# Patient Record
Sex: FEMALE | Race: WHITE | NOT HISPANIC OR LATINO
[De-identification: names, ages, dates, MRNs, and addresses within clinical notes are randomized per-mention and may not be internally consistent; named-entity substitution may affect disease eponyms.]

---

## 2017-04-15 ENCOUNTER — TRANSCRIPTION ENCOUNTER (OUTPATIENT)
Age: 52
End: 2017-04-15

## 2017-06-16 ENCOUNTER — OUTPATIENT (OUTPATIENT)
Dept: OUTPATIENT SERVICES | Facility: HOSPITAL | Age: 52
LOS: 1 days | Discharge: HOME | End: 2017-06-16

## 2017-06-28 DIAGNOSIS — G04.81 OTHER ENCEPHALITIS AND ENCEPHALOMYELITIS: ICD-10-CM

## 2017-07-25 ENCOUNTER — OUTPATIENT (OUTPATIENT)
Dept: OUTPATIENT SERVICES | Facility: HOSPITAL | Age: 52
LOS: 1 days | Discharge: HOME | End: 2017-07-25

## 2017-07-25 DIAGNOSIS — M54.9 DORSALGIA, UNSPECIFIED: ICD-10-CM

## 2017-11-19 ENCOUNTER — TRANSCRIPTION ENCOUNTER (OUTPATIENT)
Age: 52
End: 2017-11-19

## 2018-01-24 ENCOUNTER — TRANSCRIPTION ENCOUNTER (OUTPATIENT)
Age: 53
End: 2018-01-24

## 2018-09-29 ENCOUNTER — TRANSCRIPTION ENCOUNTER (OUTPATIENT)
Age: 53
End: 2018-09-29

## 2018-12-28 ENCOUNTER — APPOINTMENT (OUTPATIENT)
Dept: HEMATOLOGY ONCOLOGY | Facility: CLINIC | Age: 53
End: 2018-12-28

## 2018-12-28 VITALS
WEIGHT: 188 LBS | TEMPERATURE: 97.7 F | DIASTOLIC BLOOD PRESSURE: 71 MMHG | HEART RATE: 81 BPM | RESPIRATION RATE: 14 BRPM | SYSTOLIC BLOOD PRESSURE: 109 MMHG

## 2018-12-28 VITALS — HEIGHT: 66 IN | BODY MASS INDEX: 30.34 KG/M2

## 2018-12-28 DIAGNOSIS — K52.9 NONINFECTIVE GASTROENTERITIS AND COLITIS, UNSPECIFIED: ICD-10-CM

## 2018-12-28 RX ORDER — ALPRAZOLAM 0.5 MG/1
0.5 TABLET ORAL
Qty: 120 | Refills: 0 | Status: ACTIVE | COMMUNITY
Start: 2018-12-10

## 2018-12-28 NOTE — ASSESSMENT
[FreeTextEntry1] : 54 yo woman is supposed to have breast reduction surgery. Her CARPINI score is above 5. She will benefit from prophylaxis with anticoagulation post surgery for at least 7 days. Early ambulation is encouraged after surgery.

## 2018-12-28 NOTE — HISTORY OF PRESENT ILLNESS
[de-identified] : 54 yo woman is evaluated for post operative anticoagulation: she is going to have a breast reduction surgery. Her CAPRINI score is above 5. \par No PERSONAL or family  history of DVT \par Had varicose veins surgery

## 2019-01-03 ENCOUNTER — LABORATORY RESULT (OUTPATIENT)
Age: 54
End: 2019-01-03

## 2019-01-03 ENCOUNTER — OUTPATIENT (OUTPATIENT)
Dept: OUTPATIENT SERVICES | Facility: HOSPITAL | Age: 54
LOS: 1 days | Discharge: HOME | End: 2019-01-03
Payer: COMMERCIAL

## 2019-01-03 ENCOUNTER — APPOINTMENT (OUTPATIENT)
Dept: HEMATOLOGY ONCOLOGY | Facility: CLINIC | Age: 54
End: 2019-01-03

## 2019-01-03 VITALS
SYSTOLIC BLOOD PRESSURE: 106 MMHG | DIASTOLIC BLOOD PRESSURE: 77 MMHG | TEMPERATURE: 97.6 F | WEIGHT: 188 LBS | HEIGHT: 66 IN | HEART RATE: 87 BPM | BODY MASS INDEX: 30.22 KG/M2 | RESPIRATION RATE: 14 BRPM

## 2019-01-03 LAB
HCT VFR BLD CALC: 40 %
HGB BLD-MCNC: 13.8 G/DL
MCHC RBC-ENTMCNC: 31.4 PG
MCHC RBC-ENTMCNC: 34.5 G/DL
MCV RBC AUTO: 90.9 FL
PLATELET # BLD AUTO: 222 K/UL
PMV BLD: 11.3 FL
RBC # BLD: 4.4 M/UL
RBC # FLD: 11.8 %
RETICS # AUTO: 1.5 %
RETICS AGGREG/RBC NFR: 63.8 K/UL
WBC # FLD AUTO: 5.27 K/UL

## 2019-01-03 PROCEDURE — 93970 EXTREMITY STUDY: CPT | Mod: 26

## 2019-01-03 NOTE — HISTORY OF PRESENT ILLNESS
[de-identified] : 54 yo woman is evaluated for post operative anticoagulation: she is going to have a breast reduction surgery. Her CAPRINI score is above 5. \par No PERSONAL or family  history of DVT \par Had varicose veins surgery because of the collapse of varicose veins

## 2019-01-03 NOTE — ASSESSMENT
[FreeTextEntry1] : Breast surgeon requests COMPREHENSIVE HEMATOLOGY w/u before breast reduction including hypercoagulability w/u and doppler of legs; the concern of the surgeon is a risk of blood clot\par \par will get labs and doppler\par rtc in one week\par advised a breast surgeon to refer pt to vascular surgery

## 2019-01-04 LAB
ALBUMIN SERPL ELPH-MCNC: 4.6 G/DL
ALP BLD-CCNC: 70 U/L
ALT SERPL-CCNC: 47 U/L
ANION GAP SERPL CALC-SCNC: 15 MMOL/L
APTT BLD: 29.2 SEC
AST SERPL-CCNC: 27 U/L
AT III PPP CHRO-ACNC: 106 %
BILIRUB DIRECT SERPL-MCNC: <0.2 MG/DL
BILIRUB INDIRECT SERPL-MCNC: >0.2 MG/DL
BILIRUB SERPL-MCNC: 0.4 MG/DL
BUN SERPL-MCNC: 16 MG/DL
CALCIUM SERPL-MCNC: 9.6 MG/DL
CHLORIDE SERPL-SCNC: 100 MMOL/L
CO2 SERPL-SCNC: 26 MMOL/L
CREAT SERPL-MCNC: 0.8 MG/DL
DEPRECATED D DIMER PPP IA-ACNC: 148 NG/ML DDU
FERRITIN SERPL-MCNC: 133 NG/ML
FIBRINOGEN PPP COAG.DERIVED-MCNC: 360 MG/DL
FOLATE SERPL-MCNC: 9.3 NG/ML
GLUCOSE SERPL-MCNC: 104 MG/DL
HAPTOGLOB SERPL-MCNC: 104 MG/DL
INR PPP: 0.96 RATIO
IRON SATN MFR SERPL: 30 %
IRON SERPL-MCNC: 103 UG/DL
POTASSIUM SERPL-SCNC: 4.4 MMOL/L
PROT S AG ACT/NOR PPP IA: 151 %
PROT SERPL-MCNC: 6.9 G/DL
PT BLD: 11.1 SEC
SODIUM SERPL-SCNC: 141 MMOL/L
TIBC SERPL-MCNC: 345 UG/DL
UIBC SERPL-MCNC: 242 UG/DL
VIT B12 SERPL-MCNC: 477 PG/ML

## 2019-01-07 LAB
B2 GLYCOPROT1 IGA SERPL IA-ACNC: <5 SAU
B2 GLYCOPROT1 IGG SER-ACNC: <5 SGU
B2 GLYCOPROT1 IGM SER-ACNC: <5 SMU
CARDIOLIPIN IGM SER-MCNC: 7.5 GPL
CARDIOLIPIN IGM SER-MCNC: <5 MPL
CONFIRM: NORMAL
DNA PLOIDY SPEC FC-IMP: NORMAL
DRVVT IMM 1:2 NP PPP: NORMAL
DRVVT SCREEN TO CONFIRM RATIO: 0.9 RATIO
PROT C PPP CHRO-ACNC: 141 %
PTR INTERP: NORMAL
SCREEN DRVVT: NORMAL
SILICA CLOTTING TIME INTERPRETATION: NORMAL
SILICA CLOTTING TIME S/C: 0.94 RATIO

## 2019-01-08 LAB — PROT S FREE PPP-ACNC: 105 % NORMAL

## 2019-01-09 DIAGNOSIS — M79.89 OTHER SPECIFIED SOFT TISSUE DISORDERS: ICD-10-CM

## 2019-01-10 LAB — DIRECT COOMBS: NORMAL

## 2019-01-11 ENCOUNTER — APPOINTMENT (OUTPATIENT)
Dept: HEMATOLOGY ONCOLOGY | Facility: CLINIC | Age: 54
End: 2019-01-11

## 2019-01-11 ENCOUNTER — OUTPATIENT (OUTPATIENT)
Dept: OUTPATIENT SERVICES | Facility: HOSPITAL | Age: 54
LOS: 1 days | Discharge: HOME | End: 2019-01-11

## 2019-01-11 VITALS — DIASTOLIC BLOOD PRESSURE: 64 MMHG | HEART RATE: 75 BPM | TEMPERATURE: 98.3 F | SYSTOLIC BLOOD PRESSURE: 120 MMHG

## 2019-01-11 DIAGNOSIS — D68.9 COAGULATION DEFECT, UNSPECIFIED: ICD-10-CM

## 2019-01-11 NOTE — ASSESSMENT
[FreeTextEntry1] : Seen because Breast surgeon requested COMPREHENSIVE HEMATOLOGY w/u before breast reduction including hypercoagulability w/u and doppler of legs; all work up is negative including labs and duplex of labs\par \par \par advised a breast surgeon to refer pt to vascular surgery\par does not appear to have contraindications for breast reduction\par pt needs to f/u with GI

## 2019-01-11 NOTE — CONSULT LETTER
[Dear  ___] : Dear  [unfilled], [Consult Letter:] : I had the pleasure of evaluating your patient, [unfilled]. [Please see my note below.] : Please see my note below. [Consult Closing:] : Thank you very much for allowing me to participate in the care of this patient.  If you have any questions, please do not hesitate to contact me. [FreeTextEntry2] : Dr. Rosy Waite\par [phone] phone  [FreeTextEntry3] : Rickey Soliz DO\par Attending Physician,\par Hematology/ Medical Oncology\par [phone] office\par \par

## 2019-01-11 NOTE — HISTORY OF PRESENT ILLNESS
[de-identified] : 54 yo woman is evaluated for post operative anticoagulation: she is going to have a breast reduction surgery. Her CAPRINI score is above 5. \par No PERSONAL or family  history of DVT \par Had varicose veins surgery because of the collapse of varicose veins\par HLA B27+ for AS\par homozygous for genes for hemochromatosis (no records available)

## 2019-01-15 ENCOUNTER — RESULT REVIEW (OUTPATIENT)
Age: 54
End: 2019-01-15

## 2019-01-15 ENCOUNTER — OUTPATIENT (OUTPATIENT)
Dept: OUTPATIENT SERVICES | Facility: HOSPITAL | Age: 54
LOS: 1 days | Discharge: HOME | End: 2019-01-15

## 2019-01-22 LAB — SURGICAL PATHOLOGY STUDY: SIGNIFICANT CHANGE UP

## 2019-01-24 ENCOUNTER — TRANSCRIPTION ENCOUNTER (OUTPATIENT)
Age: 54
End: 2019-01-24

## 2019-01-24 ENCOUNTER — EMERGENCY (EMERGENCY)
Facility: HOSPITAL | Age: 54
LOS: 0 days | Discharge: HOME | End: 2019-01-24
Attending: EMERGENCY MEDICINE | Admitting: EMERGENCY MEDICINE
Payer: COMMERCIAL

## 2019-01-24 VITALS
HEART RATE: 91 BPM | RESPIRATION RATE: 18 BRPM | SYSTOLIC BLOOD PRESSURE: 122 MMHG | DIASTOLIC BLOOD PRESSURE: 63 MMHG | TEMPERATURE: 99 F | OXYGEN SATURATION: 98 %

## 2019-01-24 VITALS — WEIGHT: 188.94 LBS

## 2019-01-24 DIAGNOSIS — I10 ESSENTIAL (PRIMARY) HYPERTENSION: ICD-10-CM

## 2019-01-24 DIAGNOSIS — Z98.890 OTHER SPECIFIED POSTPROCEDURAL STATES: Chronic | ICD-10-CM

## 2019-01-24 DIAGNOSIS — R60.0 LOCALIZED EDEMA: ICD-10-CM

## 2019-01-24 DIAGNOSIS — Z98.86 PERSONAL HISTORY OF BREAST IMPLANT REMOVAL: ICD-10-CM

## 2019-01-24 DIAGNOSIS — M79.89 OTHER SPECIFIED SOFT TISSUE DISORDERS: ICD-10-CM

## 2019-01-24 DIAGNOSIS — Z98.890 OTHER SPECIFIED POSTPROCEDURAL STATES: ICD-10-CM

## 2019-01-24 DIAGNOSIS — E78.5 HYPERLIPIDEMIA, UNSPECIFIED: ICD-10-CM

## 2019-01-24 LAB
ALBUMIN SERPL ELPH-MCNC: 3.9 G/DL — SIGNIFICANT CHANGE UP (ref 3.5–5.2)
ALP SERPL-CCNC: 71 U/L — SIGNIFICANT CHANGE UP (ref 30–115)
ALT FLD-CCNC: 43 U/L — HIGH (ref 0–41)
ANION GAP SERPL CALC-SCNC: 15 MMOL/L — HIGH (ref 7–14)
APPEARANCE UR: ABNORMAL
AST SERPL-CCNC: 25 U/L — SIGNIFICANT CHANGE UP (ref 0–41)
BACTERIA # UR AUTO: SIGNIFICANT CHANGE UP /HPF
BASOPHILS # BLD AUTO: 0.03 K/UL — SIGNIFICANT CHANGE UP (ref 0–0.2)
BASOPHILS NFR BLD AUTO: 0.4 % — SIGNIFICANT CHANGE UP (ref 0–1)
BILIRUB SERPL-MCNC: 0.4 MG/DL — SIGNIFICANT CHANGE UP (ref 0.2–1.2)
BILIRUB UR-MCNC: NEGATIVE — SIGNIFICANT CHANGE UP
BUN SERPL-MCNC: 11 MG/DL — SIGNIFICANT CHANGE UP (ref 10–20)
CALCIUM SERPL-MCNC: 9.1 MG/DL — SIGNIFICANT CHANGE UP (ref 8.5–10.1)
CHLORIDE SERPL-SCNC: 99 MMOL/L — SIGNIFICANT CHANGE UP (ref 98–110)
CO2 SERPL-SCNC: 25 MMOL/L — SIGNIFICANT CHANGE UP (ref 17–32)
COLOR SPEC: YELLOW — SIGNIFICANT CHANGE UP
CREAT SERPL-MCNC: 0.7 MG/DL — SIGNIFICANT CHANGE UP (ref 0.7–1.5)
DIFF PNL FLD: NEGATIVE — SIGNIFICANT CHANGE UP
EOSINOPHIL # BLD AUTO: 0.21 K/UL — SIGNIFICANT CHANGE UP (ref 0–0.7)
EOSINOPHIL NFR BLD AUTO: 3.1 % — SIGNIFICANT CHANGE UP (ref 0–8)
EPI CELLS # UR: ABNORMAL /HPF
GLUCOSE SERPL-MCNC: 114 MG/DL — HIGH (ref 70–99)
GLUCOSE UR QL: NEGATIVE MG/DL — SIGNIFICANT CHANGE UP
HCT VFR BLD CALC: 36.1 % — LOW (ref 37–47)
HGB BLD-MCNC: 12 G/DL — SIGNIFICANT CHANGE UP (ref 12–16)
IMM GRANULOCYTES NFR BLD AUTO: 0.3 % — SIGNIFICANT CHANGE UP (ref 0.1–0.3)
KETONES UR-MCNC: NEGATIVE — SIGNIFICANT CHANGE UP
LEUKOCYTE ESTERASE UR-ACNC: NEGATIVE — SIGNIFICANT CHANGE UP
LYMPHOCYTES # BLD AUTO: 1.61 K/UL — SIGNIFICANT CHANGE UP (ref 1.2–3.4)
LYMPHOCYTES # BLD AUTO: 23.6 % — SIGNIFICANT CHANGE UP (ref 20.5–51.1)
MCHC RBC-ENTMCNC: 30.2 PG — SIGNIFICANT CHANGE UP (ref 27–31)
MCHC RBC-ENTMCNC: 33.2 G/DL — SIGNIFICANT CHANGE UP (ref 32–37)
MCV RBC AUTO: 90.9 FL — SIGNIFICANT CHANGE UP (ref 81–99)
MONOCYTES # BLD AUTO: 0.48 K/UL — SIGNIFICANT CHANGE UP (ref 0.1–0.6)
MONOCYTES NFR BLD AUTO: 7 % — SIGNIFICANT CHANGE UP (ref 1.7–9.3)
NEUTROPHILS # BLD AUTO: 4.48 K/UL — SIGNIFICANT CHANGE UP (ref 1.4–6.5)
NEUTROPHILS NFR BLD AUTO: 65.6 % — SIGNIFICANT CHANGE UP (ref 42.2–75.2)
NITRITE UR-MCNC: NEGATIVE — SIGNIFICANT CHANGE UP
NRBC # BLD: 0 /100 WBCS — SIGNIFICANT CHANGE UP (ref 0–0)
NT-PROBNP SERPL-SCNC: 80 PG/ML — SIGNIFICANT CHANGE UP (ref 0–300)
PH UR: 7.5 — SIGNIFICANT CHANGE UP (ref 5–8)
PLATELET # BLD AUTO: 262 K/UL — SIGNIFICANT CHANGE UP (ref 130–400)
POTASSIUM SERPL-MCNC: 4.2 MMOL/L — SIGNIFICANT CHANGE UP (ref 3.5–5)
POTASSIUM SERPL-SCNC: 4.2 MMOL/L — SIGNIFICANT CHANGE UP (ref 3.5–5)
PROT SERPL-MCNC: 6.6 G/DL — SIGNIFICANT CHANGE UP (ref 6–8)
PROT UR-MCNC: NEGATIVE MG/DL — SIGNIFICANT CHANGE UP
RBC # BLD: 3.97 M/UL — LOW (ref 4.2–5.4)
RBC # FLD: 12.3 % — SIGNIFICANT CHANGE UP (ref 11.5–14.5)
RBC CASTS # UR COMP ASSIST: SIGNIFICANT CHANGE UP /HPF
SODIUM SERPL-SCNC: 139 MMOL/L — SIGNIFICANT CHANGE UP (ref 135–146)
SP GR SPEC: 1.01 — SIGNIFICANT CHANGE UP (ref 1.01–1.03)
UROBILINOGEN FLD QL: 0.2 MG/DL — SIGNIFICANT CHANGE UP (ref 0.2–0.2)
WBC # BLD: 6.83 K/UL — SIGNIFICANT CHANGE UP (ref 4.8–10.8)
WBC # FLD AUTO: 6.83 K/UL — SIGNIFICANT CHANGE UP (ref 4.8–10.8)
WBC UR QL: SIGNIFICANT CHANGE UP /HPF

## 2019-01-24 PROCEDURE — 93970 EXTREMITY STUDY: CPT | Mod: 26

## 2019-01-24 NOTE — ED ADULT TRIAGE NOTE - CHIEF COMPLAINT QUOTE
Bilateral lower extremity swelling  (+1 edema), denies chest pain or SOB. no change in color, skin is warm and intact to bilateral lower extremities . pt reports low grade temp 99.9 , resolved with percocet. no drainage or  redness at the incision as per pt. pt has surgery 1/15 breast reduction and abdominoplasty. pt sent by the surgeon to r/o DVT

## 2019-01-24 NOTE — ED PROVIDER NOTE - OBJECTIVE STATEMENT
54 yo female with PMHx HTN, HLD presents s/p breast reduction and tummy tuck on 1/15 for LE swelling B/L R>L  x 10 days. Patient denies HX CHF, HX kidney failure, fevers, chest pain, SOB, abdominal pain, nausea, vomiting, diarrhea, constipation, dizziness, weakness, changes in PO intake, changes in urine output, changes in mental status.

## 2019-01-24 NOTE — ED PROVIDER NOTE - NSFOLLOWUPINSTRUCTIONS_ED_ALL_ED_FT
Follow up with your primary care doctor in 48 hours for re-evaluation and further treatment.    Peripheral Edema    Peripheral edema is swelling that is caused by a buildup of fluid. Peripheral edema most often affects the lower legs, ankles, and feet. It can also develop in the arms, hands, and face. The area of the body that has peripheral edema will look swollen. It may also feel heavy or warm. Your clothes may start to feel tight. Pressing on the area may make a temporary dent in your skin. You may not be able to move your arm or leg as much as usual.     There are many causes of peripheral edema. It can be a complication of other diseases, such as congestive heart failure, kidney disease, or a problem with your blood circulation. It also can be a side effect of certain medicines. It often happens to women during pregnancy. Sometimes, the cause is not known. Treating the underlying condition is often the only treatment for peripheral edema.    HOME CARE INSTRUCTIONS  Pay attention to any changes in your symptoms. Take these actions to help with your discomfort:    Raise (elevate) your legs while you are sitting or lying down.  Move around often to prevent stiffness and to lessen swelling. Do not sit or stand for long periods of time.  Wear support stockings as told by your health care provider.  Follow instructions from your health care provider about limiting salt (sodium) in your diet. Sometimes eating less salt can reduce swelling.  Take over-the-counter and prescription medicines only as told by your health care provider. Your health care provider may prescribe medicine to help your body get rid of excess water (diuretic).  Keep all follow-up visits as told by your health care provider. This is important.    SEEK MEDICAL CARE IF:  You have a fever.  Your edema starts suddenly or is getting worse, especially if you are pregnant or have a medical condition.  You have swelling in only one leg.  You have increased swelling and pain in your legs.    SEEK IMMEDIATE MEDICAL CARE IF:  You develop shortness of breath, especially when you are lying down.  You have pain in your chest or abdomen.  You feel weak.  You faint.

## 2019-01-24 NOTE — ED ADULT NURSE NOTE - OBJECTIVE STATEMENT
s/p breast reduction and abdominoplasty on 1/15/19 now c/o b/l lower extremity edema. Denies any calf pain denies any sob or c/p. Denies any complications w/ abdomen

## 2019-01-24 NOTE — ED PROVIDER NOTE - MEDICAL DECISION MAKING DETAILS
53f w b/l LE swelling after recent surgery. nontoxic appearing, n/v intact. Labs, EKG, & imaging reviewed. Pt advised regarding symptomatic/supportive care, importance of PMD/Surgeon f/u, and symptoms to prompt ED return. Copy of results given to patient.

## 2019-01-24 NOTE — ED PROVIDER NOTE - ATTENDING CONTRIBUTION TO CARE
53f w a hx of HTN & anxiety s/p breast reduction and abdominoplasty on 1/15. Pt presents w 5 days of b/l LE swelling. Symptoms are constant, moderate, no exacerbating/alleviating. Pt was on lovenox in period around procedures.     Review of Systems  Constitutional:  No fever or chills.   Eyes:  Negative.   ENMT:  No nasal congestion or discharge. No throat pain, swelling, or difficulty swallowing.  Cardiac:  No chest pain, or syncope. +edema.  Respiratory:  No dyspnea, stridor, wheezing, or cough. No hemoptysis.  GI:  No vomiting, diarrhea, or abdominal pain. No melena or hematochezia.  :  No dysuria or hematuria.   Musculoskeletal:  No joint pain or back pain.  Skin:  No skin rash, jaundice, or lesions.  Neuro:  No headache, loss of sensation, or focal weakness.  No change in mental status.     Physical Exam  General: Awake, alert, NAD, WDWN, non-toxic appearing, NCAT  Eyes: PERRL, EOMI, no icterus, lids and conjunctivae are normal  ENT: External inspection normal, pink/moist membranes, pharynx normal  CV: S1S2, regular rate and rhythm, no murmur/gallops/rubs, no JVD, 2+ pulses b/l, +edema b/l LE, no cords/homans, warm/well-perfused  Respiratory: Normal respiratory rate/effort, no respiratory distress, normal voice, speaking full sentences, lungs clear to auscultation b/l, no wheezing/rales/rhonchi, no retractions, no stridor  Abdomen: Soft abdomen, no tender/distended/guarding/rebound, no CVA tender  Musculoskeletal: FROM all 4 extremities, N/V intact, no delvin tender/deform, stable gait  Neck: FROM neck, supple, no meningismus, trachea midline, no JVD  Integumentary: Color normal for race, warm and dry, no rash  Neuro: Oriented x3, CN 2-12 grossly intact, normal motor, normal sensory  Psych: Oriented x3, mood normal, affect normal     53f w b/l LE swelling after recent surgery. nontoxic appearing, n/v intact. --Labs, EKG, US r/o DVT, observe/re-assess. 53f w a hx of HTN & anxiety s/p breast reduction and abdominoplasty on 1/15. Pt presents w 5 days of b/l LE swelling. Symptoms are constant, moderate, no exacerbating/alleviating. Pt was on lovenox in period around procedures.     Review of Systems  Constitutional:  No fever or chills.   Eyes:  Negative.   ENMT:  No nasal congestion or discharge. No throat pain, swelling, or difficulty swallowing.  Cardiac:  No chest pain, palpitations, or syncope. +edema.  Respiratory:  No dyspnea, stridor, wheezing, or cough. No hemoptysis.  GI:  No vomiting, diarrhea, or abdominal pain. No melena or hematochezia.  :  No dysuria or hematuria.   Musculoskeletal:  No joint pain or back pain.  Skin:  No skin rash, jaundice, or lesions.  Neuro:  No headache, loss of sensation, or focal weakness.  No change in mental status.     Physical Exam  General: Awake, alert, NAD, WDWN, non-toxic appearing, NCAT  Eyes: PERRL, EOMI, no icterus, lids and conjunctivae are normal  ENT: External inspection normal, pink/moist membranes, pharynx normal  CV: S1S2, regular rate and rhythm, no murmur/gallops/rubs, no JVD, 2+ pulses b/l, +edema b/l LE, no cords/homans, warm/well-perfused  Respiratory: Normal respiratory rate/effort, no respiratory distress, normal voice, speaking full sentences, lungs clear to auscultation b/l, no wheezing/rales/rhonchi, no retractions, no stridor  Abdomen: Soft abdomen, no tender/distended/guarding/rebound, no CVA tender  Musculoskeletal: FROM all 4 extremities, N/V intact, no delvin tender/deform, stable gait  Neck: FROM neck, supple, no meningismus, trachea midline, no JVD  Integumentary: Color normal for race, warm and dry, no rash  Neuro: Oriented x3, CN 2-12 grossly intact, normal motor, normal sensory  Psych: Oriented x3, mood normal, affect normal     53f w b/l LE swelling after recent surgery. nontoxic appearing, n/v intact. --Labs, EKG, US r/o DVT, observe/re-assess.

## 2019-01-24 NOTE — ED PROVIDER NOTE - PHYSICAL EXAMINATION
GEN: Well appearing, in no apparent distress.    HEAD:  Normocephalic, atraumatic.    EYES:  Clear conjunctivae without injection, drainage or discharge.    ENMT:  Nasal mucosa moist; mouth moist without ulcerations or lesions; throat moist without erythema, exudate, ulcerations or lesions.    NECK:  Supple, no masses. Normal ROM.    CARDIAC:  RRR, normal S1 and S2, no murmurs, rubs or gallops.    RESP:  Respiratory rate and effort appear normal; lungs are clear to auscultation bilaterally; no rhonchi, rales or wheezes.    ABDOMEN:  Soft, non-tender, non-distended, no masses. Normal BS throughout.    MUSCULOSKELETAL: (+) B/L leg swelling R>L, no calf tenderness. No erythema, warmth, induration. Patient able to ambulate without difficulty.  NEURO:  AAO x 3. Motor 5/5. Sensory intact.     SKIN:  Normal skin color for age and race, well-perfused; warm and dry.

## 2019-01-24 NOTE — ED PROVIDER NOTE - NS ED ROS FT
Constitutional: No fevers/chills.    Head: No injury, headache.    Eyes:  No visual changes, eye pain or discharge. No injury.    ENMT:  No hearing changes, pain, discharge or infections. No neck pain or stiffness. No loss ROM.    Cardiac:  No chest pain, SOB or edema. No chest pain with exertion.    Respiratory:  No cough or respiratory distress.     GI:  No nausea, vomiting, diarrhea or abdominal pain. No anorexia. No change in PO intake.    :  No frequency, urgency or burning. No change in urine output.    MS:  No leg pain, (+) B/L leg swelling, (-) myalgia, muscle weakness, joint pain or back pain. No loss ROM.    Neuro:  No dizziness or weakness.  No LOC.    Skin:  No skin rash.

## 2019-01-25 LAB
CULTURE RESULTS: NO GROWTH — SIGNIFICANT CHANGE UP
SPECIMEN SOURCE: SIGNIFICANT CHANGE UP

## 2019-07-26 ENCOUNTER — APPOINTMENT (OUTPATIENT)
Dept: HEMATOLOGY ONCOLOGY | Facility: CLINIC | Age: 54
End: 2019-07-26

## 2019-12-08 ENCOUNTER — TRANSCRIPTION ENCOUNTER (OUTPATIENT)
Age: 54
End: 2019-12-08

## 2019-12-10 ENCOUNTER — TRANSCRIPTION ENCOUNTER (OUTPATIENT)
Age: 54
End: 2019-12-10

## 2020-04-25 ENCOUNTER — MESSAGE (OUTPATIENT)
Age: 55
End: 2020-04-25

## 2020-05-12 ENCOUNTER — TRANSCRIPTION ENCOUNTER (OUTPATIENT)
Age: 55
End: 2020-05-12

## 2021-08-18 PROBLEM — I10 ESSENTIAL (PRIMARY) HYPERTENSION: Chronic | Status: ACTIVE | Noted: 2019-01-24

## 2021-08-18 PROBLEM — E78.5 HYPERLIPIDEMIA, UNSPECIFIED: Chronic | Status: ACTIVE | Noted: 2019-01-24

## 2021-08-20 ENCOUNTER — APPOINTMENT (OUTPATIENT)
Age: 56
End: 2021-08-20
Payer: COMMERCIAL

## 2021-08-20 VITALS
HEART RATE: 84 BPM | DIASTOLIC BLOOD PRESSURE: 86 MMHG | WEIGHT: 190 LBS | OXYGEN SATURATION: 98 % | BODY MASS INDEX: 30.53 KG/M2 | HEIGHT: 66 IN | SYSTOLIC BLOOD PRESSURE: 118 MMHG

## 2021-08-20 PROCEDURE — 99213 OFFICE O/P EST LOW 20 MIN: CPT

## 2021-08-20 NOTE — DISCUSSION/SUMMARY
[FreeTextEntry1] : VANDANA COMPLAINT AND BENEFITING, DISCUSS RECALL\par THE PATIENT'S PAP UNIT IS UNDER RECALL. THE PATIENT HAS REGISTERED THE PAP DEVICE. I DISCUSSED WITH THE PT THE PROS AND CONS TO CONTINUING PAP DEVICE GIVEN THE HEALTH RISKS.\par THE PATIENT NOT TO USE AN OZONE OR UV STERILIZER\par I WILL HELP IN ANY WAY POSSIBLE WITH TRANSITION TO NEW DEVICE\par WANT TO BUY NEW MACHINE\par ASTHMA REORDER INHALERS/ CXR

## 2021-09-23 ENCOUNTER — OUTPATIENT (OUTPATIENT)
Dept: OUTPATIENT SERVICES | Facility: HOSPITAL | Age: 56
LOS: 1 days | Discharge: HOME | End: 2021-09-23
Payer: COMMERCIAL

## 2021-09-23 ENCOUNTER — RESULT REVIEW (OUTPATIENT)
Age: 56
End: 2021-09-23

## 2021-09-23 DIAGNOSIS — Z98.890 OTHER SPECIFIED POSTPROCEDURAL STATES: Chronic | ICD-10-CM

## 2021-09-23 DIAGNOSIS — R06.02 SHORTNESS OF BREATH: ICD-10-CM

## 2021-09-23 PROCEDURE — 71046 X-RAY EXAM CHEST 2 VIEWS: CPT | Mod: 26

## 2022-01-23 ENCOUNTER — TRANSCRIPTION ENCOUNTER (OUTPATIENT)
Age: 57
End: 2022-01-23

## 2022-01-28 ENCOUNTER — TRANSCRIPTION ENCOUNTER (OUTPATIENT)
Age: 57
End: 2022-01-28

## 2022-02-16 ENCOUNTER — APPOINTMENT (OUTPATIENT)
Age: 57
End: 2022-02-16
Payer: COMMERCIAL

## 2022-02-16 VITALS
HEIGHT: 66 IN | HEART RATE: 74 BPM | BODY MASS INDEX: 30.22 KG/M2 | WEIGHT: 188 LBS | OXYGEN SATURATION: 97 % | RESPIRATION RATE: 14 BRPM | SYSTOLIC BLOOD PRESSURE: 114 MMHG | DIASTOLIC BLOOD PRESSURE: 72 MMHG

## 2022-02-16 PROCEDURE — 99213 OFFICE O/P EST LOW 20 MIN: CPT

## 2022-02-16 NOTE — DISCUSSION/SUMMARY
[FreeTextEntry1] : VANDANA COMPLAINT AND BENEFITING, DISCUSS RECALL\par ASTHMA KEEP INHALERS\par WEIGHT LOSS\par NEED PFT

## 2022-02-16 NOTE — HISTORY OF PRESENT ILLNESS
[TextBox_4] : VANDANA COMPLIANT AND BENEFITING/ MACHINE RECALLED\par ASTHMA/ INTERMITTENT CP/ TIGHTNESS

## 2022-06-28 ENCOUNTER — APPOINTMENT (OUTPATIENT)
Age: 57
End: 2022-06-28
Payer: COMMERCIAL

## 2022-06-28 VITALS
WEIGHT: 181 LBS | RESPIRATION RATE: 14 BRPM | HEIGHT: 66 IN | SYSTOLIC BLOOD PRESSURE: 122 MMHG | BODY MASS INDEX: 29.09 KG/M2 | OXYGEN SATURATION: 97 % | HEART RATE: 92 BPM | DIASTOLIC BLOOD PRESSURE: 86 MMHG

## 2022-06-28 PROCEDURE — 94729 DIFFUSING CAPACITY: CPT

## 2022-06-28 PROCEDURE — 94010 BREATHING CAPACITY TEST: CPT

## 2022-06-28 PROCEDURE — 94727 GAS DIL/WSHOT DETER LNG VOL: CPT

## 2022-06-28 PROCEDURE — 99213 OFFICE O/P EST LOW 20 MIN: CPT | Mod: 25

## 2022-06-28 NOTE — HISTORY OF PRESENT ILLNESS
[TextBox_4] : VANDANA COMPLIANT AND BENEFITING/ MACHINE RECALLED\par ASTHMA/ INTERMITTENT CP/ TIGHTNESS\par SP PFT

## 2022-06-28 NOTE — DISCUSSION/SUMMARY
[FreeTextEntry1] : VANDANA COMPLAINT AND BENEFITING, DISCUSS RECALL\par ASTHMA KEEP INHALERS\par WEIGHT LOSS\par PFT REVIEWED\par SOB ON EXERTION CHEST CT

## 2022-07-21 ENCOUNTER — OUTPATIENT (OUTPATIENT)
Dept: OUTPATIENT SERVICES | Facility: HOSPITAL | Age: 57
LOS: 1 days | Discharge: HOME | End: 2022-07-21

## 2022-07-21 ENCOUNTER — RESULT REVIEW (OUTPATIENT)
Age: 57
End: 2022-07-21

## 2022-07-21 DIAGNOSIS — Z98.890 OTHER SPECIFIED POSTPROCEDURAL STATES: Chronic | ICD-10-CM

## 2022-07-21 DIAGNOSIS — R06.02 SHORTNESS OF BREATH: ICD-10-CM

## 2022-07-21 PROCEDURE — 71250 CT THORAX DX C-: CPT | Mod: 26

## 2022-10-25 ENCOUNTER — APPOINTMENT (OUTPATIENT)
Dept: PAIN MANAGEMENT | Facility: CLINIC | Age: 57
End: 2022-10-25

## 2022-10-25 VITALS
HEIGHT: 66 IN | DIASTOLIC BLOOD PRESSURE: 98 MMHG | BODY MASS INDEX: 29.09 KG/M2 | HEART RATE: 79 BPM | SYSTOLIC BLOOD PRESSURE: 142 MMHG | WEIGHT: 181 LBS

## 2022-10-25 PROCEDURE — 99072 ADDL SUPL MATRL&STAF TM PHE: CPT

## 2022-10-25 PROCEDURE — 99204 OFFICE O/P NEW MOD 45 MIN: CPT

## 2022-10-25 NOTE — HISTORY OF PRESENT ILLNESS
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 Her chief complaint today is right sided mid to upper back pain following a work accident. On October 4th, 2022, she went to a patient's home to find that the home health aide did not show up for her shift. She assisted getting the patient out of bed, by herself, when she felt a pull and heard a snap in her back. She reported it to work immediately. Following the incident, she reported no radicular symptoms or sciatic pain at this time but notes feeling the pain "on her spine." Patient has been trialing Robaxin and Ibuprofen with little relief. She has been using heat and cold treatments with some relief. She has been utilizing her hot tub as well. She has continued to work since majority of her job does not require her to lift patients. There is no prior imaging for review.

## 2022-10-25 NOTE — ASSESSMENT
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 She denies any radicular features or sciatic pain at this time. Since there is no prior imaging, we will obtain a x-rays of the thoracic and lumbar spine. In the meantime, she will begin trialing PT to target the lumbar and thoracic spine. She is able to  continue to work light duty at this time. We will follow up in 2 weeks to review imaging and for further evaluation.  No new medications were sent.\par \par All this patients questions were answered and the conversation was understood well.\par \par Will order a thoracic spine 2 view x-ray due to pain and decrease in range of motion in that area to delineate a pain generator.\par \par Will order a Lumbar 2 view (AP AND LATERAL) x-ray due to pain and decrease in range of motion in that area to delineate a pain generator.\par \par Physical therapy of the thoracic spine 2-3 times a week for 4-8 weeks stressing a home exercise program of walking, shoulder griddle strengthening,  swimming, elliptical , recumbent bike, Ankur chi and Yoga. Use things that heat like hot shower or icy heat before rehab, exercising and at the beginning of the day, and ice (ice in a bag never directly on the skin) after activity and at the end of the day.\par \par Physical therapy of the lumbar spine 2-3 times a week for 4-8 weeks stressing a home exercise program of walking, shoulder girdle strengthening,  swimming, elliptical , recumbent bike, Ankur chi and Yoga. Use things that heat like hot shower or icy heat before rehab, exercising and at the beginning of the day, and ice (ice in a bag never directly on the skin) after activity and at the end of the day.\par \par I, Jami Pérez, attest that this documentation has been prepared under the direction and in the presence of Provider Bozena West MD.\par \par \par Thank you for allowing me to assist in the management of this patient. \par \par \par Best Regards, \par \par \par Bozena West M.D., FAAPMR\par \par \par Diplomate, American Board of Physical Medicine and Rehabilitation\par Diplomate, American Board of Pain Medicine \par Diplomate, American Board of Pain Management\par \par

## 2022-10-25 NOTE — DATA REVIEWED
[FreeTextEntry1] : SOAPP: no data collected today. \par \par UDS: No data obtained today\par \par [place] REGISTRY: Checked\par

## 2022-10-25 NOTE — PHYSICAL EXAM
[Normal Coordination] : normal coordination [Normal DTR UE/LE] : normal DTR UE/LE  [Normal Sensation] : normal sensation [Normal Mood and Affect] : normal mood and affect [Orientated] : orientated [Able to Communicate] : able to communicate [Well Developed] : well developed [Well Nourished] : well nourished [Diminished] : patella reflex diminished [Flexion] : flexion [Extension] : extension [] : patient ambulates without assistive device

## 2022-10-28 ENCOUNTER — APPOINTMENT (OUTPATIENT)
Dept: NEUROLOGY | Facility: CLINIC | Age: 57
End: 2022-10-28

## 2022-10-28 VITALS
BODY MASS INDEX: 28.93 KG/M2 | SYSTOLIC BLOOD PRESSURE: 137 MMHG | HEART RATE: 80 BPM | HEIGHT: 66 IN | DIASTOLIC BLOOD PRESSURE: 88 MMHG | WEIGHT: 180 LBS

## 2022-10-28 DIAGNOSIS — M51.14 INTERVERTEBRAL DISC DISORDERS WITH RADICULOPATHY, THORACIC REGION: ICD-10-CM

## 2022-10-28 PROCEDURE — 99072 ADDL SUPL MATRL&STAF TM PHE: CPT

## 2022-10-28 PROCEDURE — 99204 OFFICE O/P NEW MOD 45 MIN: CPT

## 2022-10-28 NOTE — ASSESSMENT
[FreeTextEntry1] :  thoracic radiculopathy- will send her for MRI of the thoracic spine without gadolinium, and she should start physical therapy as prescribed by pain management.  She can use muscle relaxant as needed.  If she is not any better in a month after therapy, she should follow up with pain management.  \par \par She is considered to have temporary mild disability, but is currently working.  Her symptoms is causally related to the work related accident that she sustained

## 2022-10-28 NOTE — PHYSICAL EXAM

## 2022-10-28 NOTE — HISTORY OF PRESENT ILLNESS
[FreeTextEntry1] : It's a pleasure to see Ms. JAY SHOEMAKER In the office today. She is a 57 year -  old woman  who presents to the office today for the evaluation of Acute right thoracic pain following a work related accident which took place on October 11, 2022.  Patient works as a  hospice nurse, and on the day of the accident she was helping  patient from the bed and she  fell stretching, ripping sensation in the middle of her back  around mid scapula.  Since the accident she has been having pain constantly, worsen with certain positions, turning as well as lifting.  She has also noticed swelling on that side as well with occasional shooting pain to the flank region.  She was seen by pain management in the walk-in clinic and was sent for x-ray of the thoracic spine which show no fracture.  She was given a muscle relaxant she only takes at night because of the drowsiness.\par \par She continues to work and would like to continue to work as long as she can be given light duty without lifting.\par

## 2022-11-18 ENCOUNTER — APPOINTMENT (OUTPATIENT)
Dept: PAIN MANAGEMENT | Facility: CLINIC | Age: 57
End: 2022-11-18

## 2022-11-23 ENCOUNTER — APPOINTMENT (OUTPATIENT)
Dept: NEUROLOGY | Facility: CLINIC | Age: 57
End: 2022-11-23

## 2022-11-30 ENCOUNTER — APPOINTMENT (OUTPATIENT)
Dept: NEUROLOGY | Facility: CLINIC | Age: 57
End: 2022-11-30

## 2022-12-28 ENCOUNTER — APPOINTMENT (OUTPATIENT)
Age: 57
End: 2022-12-28

## 2023-04-29 ENCOUNTER — NON-APPOINTMENT (OUTPATIENT)
Age: 58
End: 2023-04-29

## 2023-08-22 ENCOUNTER — NON-APPOINTMENT (OUTPATIENT)
Age: 58
End: 2023-08-22

## 2023-10-26 ENCOUNTER — APPOINTMENT (OUTPATIENT)
Dept: GASTROENTEROLOGY | Facility: CLINIC | Age: 58
End: 2023-10-26
Payer: COMMERCIAL

## 2023-10-26 VITALS
BODY MASS INDEX: 28.87 KG/M2 | OXYGEN SATURATION: 98 % | DIASTOLIC BLOOD PRESSURE: 96 MMHG | HEIGHT: 66 IN | SYSTOLIC BLOOD PRESSURE: 129 MMHG | HEART RATE: 85 BPM | WEIGHT: 179.6 LBS

## 2023-10-26 DIAGNOSIS — Z80.0 FAMILY HISTORY OF MALIGNANT NEOPLASM OF DIGESTIVE ORGANS: ICD-10-CM

## 2023-10-26 DIAGNOSIS — Z86.79 PERSONAL HISTORY OF OTHER DISEASES OF THE CIRCULATORY SYSTEM: ICD-10-CM

## 2023-10-26 DIAGNOSIS — E73.9 LACTOSE INTOLERANCE, UNSPECIFIED: ICD-10-CM

## 2023-10-26 DIAGNOSIS — Z87.19 PERSONAL HISTORY OF OTHER DISEASES OF THE DIGESTIVE SYSTEM: ICD-10-CM

## 2023-10-26 DIAGNOSIS — Z86.69 PERSONAL HISTORY OF OTHER DISEASES OF THE NERVOUS SYSTEM AND SENSE ORGANS: ICD-10-CM

## 2023-10-26 DIAGNOSIS — Z86.39 PERSONAL HISTORY OF OTHER ENDOCRINE, NUTRITIONAL AND METABOLIC DISEASE: ICD-10-CM

## 2023-10-26 DIAGNOSIS — Z00.00 ENCOUNTER FOR GENERAL ADULT MEDICAL EXAMINATION W/OUT ABNORMAL FINDINGS: ICD-10-CM

## 2023-10-26 PROCEDURE — 91200 LIVER ELASTOGRAPHY: CPT

## 2023-10-26 PROCEDURE — 99205 OFFICE O/P NEW HI 60 MIN: CPT

## 2023-10-26 RX ORDER — SIMVASTATIN 10 MG/1
10 TABLET, FILM COATED ORAL
Qty: 90 | Refills: 0 | Status: DISCONTINUED | COMMUNITY
Start: 2018-08-14 | End: 2023-10-26

## 2023-10-26 RX ORDER — METOPROLOL TARTRATE 25 MG/1
25 TABLET, FILM COATED ORAL
Qty: 15 | Refills: 0 | Status: DISCONTINUED | COMMUNITY
Start: 2018-11-01 | End: 2023-10-26

## 2023-10-26 RX ORDER — METHOCARBAMOL 750 MG/1
750 TABLET, FILM COATED ORAL 3 TIMES DAILY
Qty: 90 | Refills: 0 | Status: DISCONTINUED | COMMUNITY
Start: 2022-10-26 | End: 2023-10-26

## 2023-10-26 RX ORDER — DICYCLOMINE HYDROCHLORIDE 10 MG/1
10 CAPSULE ORAL
Qty: 90 | Refills: 0 | Status: DISCONTINUED | COMMUNITY
Start: 2018-10-06 | End: 2023-10-26

## 2023-10-26 RX ORDER — BUDESONIDE AND FORMOTEROL FUMARATE DIHYDRATE 160; 4.5 UG/1; UG/1
160-4.5 AEROSOL RESPIRATORY (INHALATION) TWICE DAILY
Qty: 1 | Refills: 3 | Status: DISCONTINUED | COMMUNITY
Start: 2021-08-20 | End: 2023-10-26

## 2023-10-26 RX ORDER — ALBUTEROL SULFATE 90 UG/1
108 (90 BASE) AEROSOL, METERED RESPIRATORY (INHALATION)
Qty: 1 | Refills: 5 | Status: DISCONTINUED | COMMUNITY
Start: 2021-08-20 | End: 2023-10-26

## 2023-10-26 RX ORDER — HYDROCORTISONE ACETATE 25 MG/1
25 SUPPOSITORY RECTAL
Qty: 30 | Refills: 0 | Status: DISCONTINUED | COMMUNITY
Start: 2018-10-06 | End: 2023-10-26

## 2023-10-26 RX ORDER — NEBIVOLOL HYDROCHLORIDE 5 MG/1
5 TABLET ORAL
Qty: 30 | Refills: 0 | Status: DISCONTINUED | COMMUNITY
Start: 2018-11-12 | End: 2023-10-26

## 2023-10-26 RX ORDER — MECLIZINE HYDROCHLORIDE 25 MG/1
25 TABLET ORAL
Qty: 30 | Refills: 0 | Status: DISCONTINUED | COMMUNITY
Start: 2018-09-29 | End: 2023-10-26

## 2023-10-26 RX ORDER — PANTOPRAZOLE 40 MG/1
40 TABLET, DELAYED RELEASE ORAL
Qty: 30 | Refills: 0 | Status: DISCONTINUED | COMMUNITY
Start: 2018-12-07 | End: 2023-10-26

## 2023-10-27 PROBLEM — Z87.19 HISTORY OF DIVERTICULITIS OF COLON: Status: RESOLVED | Noted: 2023-10-26 | Resolved: 2023-10-27

## 2023-10-27 PROBLEM — Z86.69 HISTORY OF CATARACT: Status: RESOLVED | Noted: 2023-10-26 | Resolved: 2023-10-27

## 2023-10-27 PROBLEM — E73.9 LACTOSE INTOLERANCE: Status: RESOLVED | Noted: 2023-10-26 | Resolved: 2023-10-27

## 2023-10-27 PROBLEM — Z80.0 FAMILY HISTORY OF COLORECTAL CANCER: Status: ACTIVE | Noted: 2023-10-26

## 2023-10-27 PROBLEM — Z86.39 HISTORY OF HYPERLIPIDEMIA: Status: RESOLVED | Noted: 2023-10-26 | Resolved: 2023-10-27

## 2023-10-27 PROBLEM — Z86.79 HISTORY OF HYPERTENSION: Status: RESOLVED | Noted: 2023-10-26 | Resolved: 2023-10-27

## 2023-10-31 LAB
ACE BLD-CCNC: 57 U/L
ALBUMIN SERPL ELPH-MCNC: 4.3 G/DL
ALP BLD-CCNC: 118 U/L
ALT SERPL-CCNC: 57 U/L
ANA SER IF-ACNC: NEGATIVE
ANION GAP SERPL CALC-SCNC: 14 MMOL/L
AST SERPL-CCNC: 26 U/L
BILIRUB SERPL-MCNC: 0.5 MG/DL
BUN SERPL-MCNC: 14 MG/DL
CALCIUM SERPL-MCNC: 9.2 MG/DL
CERULOPLASMIN SERPL-MCNC: 23 MG/DL
CHLORIDE SERPL-SCNC: 99 MMOL/L
CO2 SERPL-SCNC: 23 MMOL/L
CREAT SERPL-MCNC: 0.7 MG/DL
DEPRECATED KAPPA LC FREE/LAMBDA SER: 1.47 RATIO
EGFR: 101 ML/MIN/1.73M2
FERRITIN SERPL-MCNC: 241 NG/ML
GLUCOSE SERPL-MCNC: 295 MG/DL
HBV CORE IGG+IGM SER QL: NONREACTIVE
HBV SURFACE AB SER QL: REACTIVE
HEPATITIS A IGG ANTIBODY: NONREACTIVE
IGA SER QL IEP: 142 MG/DL
IGG SER QL IEP: 876 MG/DL
IGM SER QL IEP: 91 MG/DL
INR PPP: 0.97 RATIO
IRON SATN MFR SERPL: 34 %
IRON SERPL-MCNC: 115 UG/DL
KAPPA LC CSF-MCNC: 0.9 MG/DL
KAPPA LC SERPL-MCNC: 1.32 MG/DL
LKM AB SER QL IF: <20.1 UNITS
MITOCHONDRIA AB SER IF-ACNC: NORMAL
POTASSIUM SERPL-SCNC: 4.1 MMOL/L
PROT SERPL-MCNC: 6.2 G/DL
PT BLD: 11.1 SEC
SMOOTH MUSCLE AB SER QL IF: NORMAL
SODIUM SERPL-SCNC: 136 MMOL/L
TIBC SERPL-MCNC: 337 UG/DL
TRANSFERRIN SERPL-MCNC: 285 MG/DL
TSH SERPL-ACNC: 1.79 UIU/ML
TTG IGA SER IA-ACNC: 1.2 U/ML
TTG IGA SER IA-ACNC: 1.2 U/ML
TTG IGA SER-ACNC: NEGATIVE
TTG IGA SER-ACNC: NEGATIVE
UIBC SERPL-MCNC: 222 UG/DL

## 2023-10-31 NOTE — ED PROVIDER NOTE - TOBACCO USE
76 YOF with PMH of hyperthyroidism, palpitations, hyperlipidemia, aortic root aneurysm, overactive bladder, GERD, right breast cancer s/p lumpectomy + chemoradiation admitted for elective R hip ANGEL s/p OR with Dr. Douglas 10/30    R hip pain s/p ANGEL  Post operative state  - management per ortho, s/p OR with Dr. Douglas on 10/30  - pain control with bowel regimen  - frequent perioperative incentive spirometer use  - early ambulation and OOBTC as tolerated  - consider chemical DVT ppx with LMWH or DOAC (on asa 81mg BID)    Hyperthyroidism  - cont home methimazole 5mg    Palpitations  - does not recall what rhythm  - cont home metoprolol succinate 25mg     Hyperlipidemia  - cont home atorvastatin 20mg     Aortic root aneurysm  - TTE 8/2022: aortic root 3.9cm  - outpatient cardiology f/u for surveillance     Overactive bladder  - cont home mirabegron 25mg on discharge    GERD  - cont pantoprazole 40mg    Right breast cancer, remission  - s/p lumpectomy + chemoradiation    Chronic headaches  - Fiorcet PRN    Dispo: home with [facility] - tentatively plan for tomorrow afternoon, consider DC tele monitoring     Plan discussed with primary team.
Unknown if ever smoked

## 2023-11-03 LAB
A1AT PHENOTYP SERPL-IMP: NORMAL
A1AT SERPL-MCNC: 118 MG/DL
SOLUBLE LIVER IGG SER IA-ACNC: 0.6
TM INTERPRETATION: NORMAL

## 2023-11-06 LAB
LYSOSOMAL ACID LIPASE INTERPRETATION: NORMAL
LYSOSOMAL ACID LIPASE: 57 PMOL/HR/UL

## 2023-11-09 LAB — COPPER SERPL-MCNC: 123 UG/DL

## 2023-11-10 ENCOUNTER — TRANSCRIPTION ENCOUNTER (OUTPATIENT)
Age: 58
End: 2023-11-10

## 2023-11-18 ENCOUNTER — INPATIENT (INPATIENT)
Facility: HOSPITAL | Age: 58
LOS: 3 days | Discharge: HOME | DRG: 205 | End: 2023-11-22
Attending: HOSPITALIST | Admitting: HOSPITALIST
Payer: COMMERCIAL

## 2023-11-18 VITALS
DIASTOLIC BLOOD PRESSURE: 85 MMHG | HEART RATE: 118 BPM | SYSTOLIC BLOOD PRESSURE: 124 MMHG | RESPIRATION RATE: 22 BRPM | TEMPERATURE: 99 F | WEIGHT: 177.91 LBS | OXYGEN SATURATION: 90 %

## 2023-11-18 DIAGNOSIS — J96.01 ACUTE RESPIRATORY FAILURE WITH HYPOXIA: ICD-10-CM

## 2023-11-18 DIAGNOSIS — J45.901 UNSPECIFIED ASTHMA WITH (ACUTE) EXACERBATION: ICD-10-CM

## 2023-11-18 DIAGNOSIS — E11.9 TYPE 2 DIABETES MELLITUS WITHOUT COMPLICATIONS: ICD-10-CM

## 2023-11-18 DIAGNOSIS — E83.42 HYPOMAGNESEMIA: ICD-10-CM

## 2023-11-18 DIAGNOSIS — Z29.9 ENCOUNTER FOR PROPHYLACTIC MEASURES, UNSPECIFIED: ICD-10-CM

## 2023-11-18 DIAGNOSIS — I10 ESSENTIAL (PRIMARY) HYPERTENSION: ICD-10-CM

## 2023-11-18 DIAGNOSIS — Z98.890 OTHER SPECIFIED POSTPROCEDURAL STATES: Chronic | ICD-10-CM

## 2023-11-18 DIAGNOSIS — J69.0 PNEUMONITIS DUE TO INHALATION OF FOOD AND VOMIT: ICD-10-CM

## 2023-11-18 DIAGNOSIS — R06.02 SHORTNESS OF BREATH: ICD-10-CM

## 2023-11-18 LAB
ALBUMIN SERPL ELPH-MCNC: 4.6 G/DL — SIGNIFICANT CHANGE UP (ref 3.5–5.2)
ALBUMIN SERPL ELPH-MCNC: 4.6 G/DL — SIGNIFICANT CHANGE UP (ref 3.5–5.2)
ALP SERPL-CCNC: 140 U/L — HIGH (ref 30–115)
ALP SERPL-CCNC: 140 U/L — HIGH (ref 30–115)
ALT FLD-CCNC: 70 U/L — HIGH (ref 0–41)
ALT FLD-CCNC: 70 U/L — HIGH (ref 0–41)
ANION GAP SERPL CALC-SCNC: 14 MMOL/L — SIGNIFICANT CHANGE UP (ref 7–14)
ANION GAP SERPL CALC-SCNC: 14 MMOL/L — SIGNIFICANT CHANGE UP (ref 7–14)
APTT BLD: 24.6 SEC — LOW (ref 27–39.2)
APTT BLD: 24.6 SEC — LOW (ref 27–39.2)
AST SERPL-CCNC: 45 U/L — HIGH (ref 0–41)
AST SERPL-CCNC: 45 U/L — HIGH (ref 0–41)
BASOPHILS # BLD AUTO: 0.04 K/UL — SIGNIFICANT CHANGE UP (ref 0–0.2)
BASOPHILS # BLD AUTO: 0.04 K/UL — SIGNIFICANT CHANGE UP (ref 0–0.2)
BASOPHILS NFR BLD AUTO: 0.4 % — SIGNIFICANT CHANGE UP (ref 0–1)
BASOPHILS NFR BLD AUTO: 0.4 % — SIGNIFICANT CHANGE UP (ref 0–1)
BILIRUB SERPL-MCNC: 0.7 MG/DL — SIGNIFICANT CHANGE UP (ref 0.2–1.2)
BILIRUB SERPL-MCNC: 0.7 MG/DL — SIGNIFICANT CHANGE UP (ref 0.2–1.2)
BUN SERPL-MCNC: 14 MG/DL — SIGNIFICANT CHANGE UP (ref 10–20)
BUN SERPL-MCNC: 14 MG/DL — SIGNIFICANT CHANGE UP (ref 10–20)
CALCIUM SERPL-MCNC: 9.9 MG/DL — SIGNIFICANT CHANGE UP (ref 8.4–10.5)
CALCIUM SERPL-MCNC: 9.9 MG/DL — SIGNIFICANT CHANGE UP (ref 8.4–10.5)
CHLORIDE SERPL-SCNC: 98 MMOL/L — SIGNIFICANT CHANGE UP (ref 98–110)
CHLORIDE SERPL-SCNC: 98 MMOL/L — SIGNIFICANT CHANGE UP (ref 98–110)
CO2 SERPL-SCNC: 27 MMOL/L — SIGNIFICANT CHANGE UP (ref 17–32)
CO2 SERPL-SCNC: 27 MMOL/L — SIGNIFICANT CHANGE UP (ref 17–32)
CREAT SERPL-MCNC: 0.9 MG/DL — SIGNIFICANT CHANGE UP (ref 0.7–1.5)
CREAT SERPL-MCNC: 0.9 MG/DL — SIGNIFICANT CHANGE UP (ref 0.7–1.5)
EGFR: 75 ML/MIN/1.73M2 — SIGNIFICANT CHANGE UP
EGFR: 75 ML/MIN/1.73M2 — SIGNIFICANT CHANGE UP
EOSINOPHIL # BLD AUTO: 0.03 K/UL — SIGNIFICANT CHANGE UP (ref 0–0.7)
EOSINOPHIL # BLD AUTO: 0.03 K/UL — SIGNIFICANT CHANGE UP (ref 0–0.7)
EOSINOPHIL NFR BLD AUTO: 0.3 % — SIGNIFICANT CHANGE UP (ref 0–8)
EOSINOPHIL NFR BLD AUTO: 0.3 % — SIGNIFICANT CHANGE UP (ref 0–8)
FLUAV AG NPH QL: SIGNIFICANT CHANGE UP
FLUAV AG NPH QL: SIGNIFICANT CHANGE UP
FLUBV AG NPH QL: SIGNIFICANT CHANGE UP
FLUBV AG NPH QL: SIGNIFICANT CHANGE UP
GLUCOSE SERPL-MCNC: 211 MG/DL — HIGH (ref 70–99)
GLUCOSE SERPL-MCNC: 211 MG/DL — HIGH (ref 70–99)
HCT VFR BLD CALC: 44.5 % — SIGNIFICANT CHANGE UP (ref 37–47)
HCT VFR BLD CALC: 44.5 % — SIGNIFICANT CHANGE UP (ref 37–47)
HGB BLD-MCNC: 15.3 G/DL — SIGNIFICANT CHANGE UP (ref 12–16)
HGB BLD-MCNC: 15.3 G/DL — SIGNIFICANT CHANGE UP (ref 12–16)
IMM GRANULOCYTES NFR BLD AUTO: 0.2 % — SIGNIFICANT CHANGE UP (ref 0.1–0.3)
IMM GRANULOCYTES NFR BLD AUTO: 0.2 % — SIGNIFICANT CHANGE UP (ref 0.1–0.3)
INR BLD: 1.04 RATIO — SIGNIFICANT CHANGE UP (ref 0.65–1.3)
INR BLD: 1.04 RATIO — SIGNIFICANT CHANGE UP (ref 0.65–1.3)
LYMPHOCYTES # BLD AUTO: 1.27 K/UL — SIGNIFICANT CHANGE UP (ref 1.2–3.4)
LYMPHOCYTES # BLD AUTO: 1.27 K/UL — SIGNIFICANT CHANGE UP (ref 1.2–3.4)
LYMPHOCYTES # BLD AUTO: 13.2 % — LOW (ref 20.5–51.1)
LYMPHOCYTES # BLD AUTO: 13.2 % — LOW (ref 20.5–51.1)
MAGNESIUM SERPL-MCNC: 1.6 MG/DL — LOW (ref 1.8–2.4)
MAGNESIUM SERPL-MCNC: 1.6 MG/DL — LOW (ref 1.8–2.4)
MCHC RBC-ENTMCNC: 31 PG — SIGNIFICANT CHANGE UP (ref 27–31)
MCHC RBC-ENTMCNC: 31 PG — SIGNIFICANT CHANGE UP (ref 27–31)
MCHC RBC-ENTMCNC: 34.4 G/DL — SIGNIFICANT CHANGE UP (ref 32–37)
MCHC RBC-ENTMCNC: 34.4 G/DL — SIGNIFICANT CHANGE UP (ref 32–37)
MCV RBC AUTO: 90.1 FL — SIGNIFICANT CHANGE UP (ref 81–99)
MCV RBC AUTO: 90.1 FL — SIGNIFICANT CHANGE UP (ref 81–99)
MONOCYTES # BLD AUTO: 0.29 K/UL — SIGNIFICANT CHANGE UP (ref 0.1–0.6)
MONOCYTES # BLD AUTO: 0.29 K/UL — SIGNIFICANT CHANGE UP (ref 0.1–0.6)
MONOCYTES NFR BLD AUTO: 3 % — SIGNIFICANT CHANGE UP (ref 1.7–9.3)
MONOCYTES NFR BLD AUTO: 3 % — SIGNIFICANT CHANGE UP (ref 1.7–9.3)
NEUTROPHILS # BLD AUTO: 8 K/UL — HIGH (ref 1.4–6.5)
NEUTROPHILS # BLD AUTO: 8 K/UL — HIGH (ref 1.4–6.5)
NEUTROPHILS NFR BLD AUTO: 82.9 % — HIGH (ref 42.2–75.2)
NEUTROPHILS NFR BLD AUTO: 82.9 % — HIGH (ref 42.2–75.2)
NRBC # BLD: 0 /100 WBCS — SIGNIFICANT CHANGE UP (ref 0–0)
NRBC # BLD: 0 /100 WBCS — SIGNIFICANT CHANGE UP (ref 0–0)
NT-PROBNP SERPL-SCNC: <36 PG/ML — SIGNIFICANT CHANGE UP (ref 0–300)
NT-PROBNP SERPL-SCNC: <36 PG/ML — SIGNIFICANT CHANGE UP (ref 0–300)
PLATELET # BLD AUTO: 252 K/UL — SIGNIFICANT CHANGE UP (ref 130–400)
PLATELET # BLD AUTO: 252 K/UL — SIGNIFICANT CHANGE UP (ref 130–400)
PMV BLD: 10.7 FL — HIGH (ref 7.4–10.4)
PMV BLD: 10.7 FL — HIGH (ref 7.4–10.4)
POTASSIUM SERPL-MCNC: 4 MMOL/L — SIGNIFICANT CHANGE UP (ref 3.5–5)
POTASSIUM SERPL-MCNC: 4 MMOL/L — SIGNIFICANT CHANGE UP (ref 3.5–5)
POTASSIUM SERPL-SCNC: 4 MMOL/L — SIGNIFICANT CHANGE UP (ref 3.5–5)
POTASSIUM SERPL-SCNC: 4 MMOL/L — SIGNIFICANT CHANGE UP (ref 3.5–5)
PROT SERPL-MCNC: 7.2 G/DL — SIGNIFICANT CHANGE UP (ref 6–8)
PROT SERPL-MCNC: 7.2 G/DL — SIGNIFICANT CHANGE UP (ref 6–8)
PROTHROM AB SERPL-ACNC: 11.9 SEC — SIGNIFICANT CHANGE UP (ref 9.95–12.87)
PROTHROM AB SERPL-ACNC: 11.9 SEC — SIGNIFICANT CHANGE UP (ref 9.95–12.87)
RBC # BLD: 4.94 M/UL — SIGNIFICANT CHANGE UP (ref 4.2–5.4)
RBC # BLD: 4.94 M/UL — SIGNIFICANT CHANGE UP (ref 4.2–5.4)
RBC # FLD: 12.1 % — SIGNIFICANT CHANGE UP (ref 11.5–14.5)
RBC # FLD: 12.1 % — SIGNIFICANT CHANGE UP (ref 11.5–14.5)
RSV RNA NPH QL NAA+NON-PROBE: SIGNIFICANT CHANGE UP
RSV RNA NPH QL NAA+NON-PROBE: SIGNIFICANT CHANGE UP
SARS-COV-2 RNA SPEC QL NAA+PROBE: SIGNIFICANT CHANGE UP
SARS-COV-2 RNA SPEC QL NAA+PROBE: SIGNIFICANT CHANGE UP
SODIUM SERPL-SCNC: 139 MMOL/L — SIGNIFICANT CHANGE UP (ref 135–146)
SODIUM SERPL-SCNC: 139 MMOL/L — SIGNIFICANT CHANGE UP (ref 135–146)
TROPONIN T SERPL-MCNC: <0.01 NG/ML — SIGNIFICANT CHANGE UP
TROPONIN T SERPL-MCNC: <0.01 NG/ML — SIGNIFICANT CHANGE UP
WBC # BLD: 9.65 K/UL — SIGNIFICANT CHANGE UP (ref 4.8–10.8)
WBC # BLD: 9.65 K/UL — SIGNIFICANT CHANGE UP (ref 4.8–10.8)
WBC # FLD AUTO: 9.65 K/UL — SIGNIFICANT CHANGE UP (ref 4.8–10.8)
WBC # FLD AUTO: 9.65 K/UL — SIGNIFICANT CHANGE UP (ref 4.8–10.8)

## 2023-11-18 PROCEDURE — 71045 X-RAY EXAM CHEST 1 VIEW: CPT

## 2023-11-18 PROCEDURE — 71250 CT THORAX DX C-: CPT

## 2023-11-18 PROCEDURE — 87899 AGENT NOS ASSAY W/OPTIC: CPT

## 2023-11-18 PROCEDURE — 87070 CULTURE OTHR SPECIMN AEROBIC: CPT

## 2023-11-18 PROCEDURE — 94640 AIRWAY INHALATION TREATMENT: CPT

## 2023-11-18 PROCEDURE — 83036 HEMOGLOBIN GLYCOSYLATED A1C: CPT

## 2023-11-18 PROCEDURE — 99223 1ST HOSP IP/OBS HIGH 75: CPT

## 2023-11-18 PROCEDURE — 87449 NOS EACH ORGANISM AG IA: CPT

## 2023-11-18 PROCEDURE — 80048 BASIC METABOLIC PNL TOTAL CA: CPT

## 2023-11-18 PROCEDURE — 87116 MYCOBACTERIA CULTURE: CPT

## 2023-11-18 PROCEDURE — 93010 ELECTROCARDIOGRAM REPORT: CPT

## 2023-11-18 PROCEDURE — 0225U NFCT DS DNA&RNA 21 SARSCOV2: CPT

## 2023-11-18 PROCEDURE — 83735 ASSAY OF MAGNESIUM: CPT

## 2023-11-18 PROCEDURE — 82962 GLUCOSE BLOOD TEST: CPT

## 2023-11-18 PROCEDURE — 85379 FIBRIN DEGRADATION QUANT: CPT

## 2023-11-18 PROCEDURE — 85025 COMPLETE CBC W/AUTO DIFF WBC: CPT

## 2023-11-18 PROCEDURE — 71045 X-RAY EXAM CHEST 1 VIEW: CPT | Mod: 26

## 2023-11-18 PROCEDURE — 93010 ELECTROCARDIOGRAM REPORT: CPT | Mod: 77

## 2023-11-18 PROCEDURE — 87015 SPECIMEN INFECT AGNT CONCNTJ: CPT

## 2023-11-18 PROCEDURE — 87206 SMEAR FLUORESCENT/ACID STAI: CPT

## 2023-11-18 PROCEDURE — 84100 ASSAY OF PHOSPHORUS: CPT

## 2023-11-18 PROCEDURE — 99285 EMERGENCY DEPT VISIT HI MDM: CPT

## 2023-11-18 PROCEDURE — 80053 COMPREHEN METABOLIC PANEL: CPT

## 2023-11-18 PROCEDURE — 36415 COLL VENOUS BLD VENIPUNCTURE: CPT

## 2023-11-18 RX ORDER — ALPRAZOLAM 0.25 MG
0.5 TABLET ORAL AT BEDTIME
Refills: 0 | Status: DISCONTINUED | OUTPATIENT
Start: 2023-11-18 | End: 2023-11-19

## 2023-11-18 RX ORDER — PANTOPRAZOLE SODIUM 20 MG/1
40 TABLET, DELAYED RELEASE ORAL
Refills: 0 | Status: DISCONTINUED | OUTPATIENT
Start: 2023-11-18 | End: 2023-11-19

## 2023-11-18 RX ORDER — SODIUM CHLORIDE 9 MG/ML
1000 INJECTION, SOLUTION INTRAVENOUS ONCE
Refills: 0 | Status: COMPLETED | OUTPATIENT
Start: 2023-11-18 | End: 2023-11-18

## 2023-11-18 RX ORDER — INSULIN LISPRO 100/ML
VIAL (ML) SUBCUTANEOUS AT BEDTIME
Refills: 0 | Status: DISCONTINUED | OUTPATIENT
Start: 2023-11-18 | End: 2023-11-19

## 2023-11-18 RX ORDER — IBUPROFEN 200 MG
400 TABLET ORAL EVERY 8 HOURS
Refills: 0 | Status: DISCONTINUED | OUTPATIENT
Start: 2023-11-18 | End: 2023-11-19

## 2023-11-18 RX ORDER — DEXAMETHASONE 0.5 MG/5ML
6 ELIXIR ORAL EVERY 12 HOURS
Refills: 0 | Status: DISCONTINUED | OUTPATIENT
Start: 2023-11-18 | End: 2023-11-21

## 2023-11-18 RX ORDER — SODIUM CHLORIDE 9 MG/ML
1000 INJECTION, SOLUTION INTRAVENOUS
Refills: 0 | Status: DISCONTINUED | OUTPATIENT
Start: 2023-11-18 | End: 2023-11-19

## 2023-11-18 RX ORDER — ACETAMINOPHEN 500 MG
650 TABLET ORAL EVERY 6 HOURS
Refills: 0 | Status: DISCONTINUED | OUTPATIENT
Start: 2023-11-18 | End: 2023-11-18

## 2023-11-18 RX ORDER — MAGNESIUM SULFATE 500 MG/ML
2 VIAL (ML) INJECTION ONCE
Refills: 0 | Status: COMPLETED | OUTPATIENT
Start: 2023-11-18 | End: 2023-11-18

## 2023-11-18 RX ORDER — DEXTROSE 50 % IN WATER 50 %
25 SYRINGE (ML) INTRAVENOUS ONCE
Refills: 0 | Status: DISCONTINUED | OUTPATIENT
Start: 2023-11-18 | End: 2023-11-19

## 2023-11-18 RX ORDER — MAGNESIUM OXIDE 400 MG ORAL TABLET 241.3 MG
400 TABLET ORAL
Refills: 0 | Status: DISCONTINUED | OUTPATIENT
Start: 2023-11-18 | End: 2023-11-22

## 2023-11-18 RX ORDER — IPRATROPIUM/ALBUTEROL SULFATE 18-103MCG
3 AEROSOL WITH ADAPTER (GRAM) INHALATION EVERY 6 HOURS
Refills: 0 | Status: DISCONTINUED | OUTPATIENT
Start: 2023-11-18 | End: 2023-11-18

## 2023-11-18 RX ORDER — LEVALBUTEROL 1.25 MG/.5ML
1.25 SOLUTION, CONCENTRATE RESPIRATORY (INHALATION) EVERY 4 HOURS
Refills: 0 | Status: DISCONTINUED | OUTPATIENT
Start: 2023-11-18 | End: 2023-11-22

## 2023-11-18 RX ORDER — CEFTRIAXONE 500 MG/1
1000 INJECTION, POWDER, FOR SOLUTION INTRAMUSCULAR; INTRAVENOUS ONCE
Refills: 0 | Status: COMPLETED | OUTPATIENT
Start: 2023-11-18 | End: 2023-11-18

## 2023-11-18 RX ORDER — AZITHROMYCIN 500 MG/1
500 TABLET, FILM COATED ORAL EVERY 24 HOURS
Refills: 0 | Status: DISCONTINUED | OUTPATIENT
Start: 2023-11-18 | End: 2023-11-20

## 2023-11-18 RX ORDER — GLUCAGON INJECTION, SOLUTION 0.5 MG/.1ML
1 INJECTION, SOLUTION SUBCUTANEOUS ONCE
Refills: 0 | Status: DISCONTINUED | OUTPATIENT
Start: 2023-11-18 | End: 2023-11-19

## 2023-11-18 RX ORDER — DEXTROSE 50 % IN WATER 50 %
12.5 SYRINGE (ML) INTRAVENOUS ONCE
Refills: 0 | Status: DISCONTINUED | OUTPATIENT
Start: 2023-11-18 | End: 2023-11-19

## 2023-11-18 RX ORDER — AZITHROMYCIN 500 MG/1
500 TABLET, FILM COATED ORAL ONCE
Refills: 0 | Status: COMPLETED | OUTPATIENT
Start: 2023-11-18 | End: 2023-11-18

## 2023-11-18 RX ORDER — DEXTROSE 50 % IN WATER 50 %
15 SYRINGE (ML) INTRAVENOUS ONCE
Refills: 0 | Status: DISCONTINUED | OUTPATIENT
Start: 2023-11-18 | End: 2023-11-19

## 2023-11-18 RX ORDER — IPRATROPIUM/ALBUTEROL SULFATE 18-103MCG
3 AEROSOL WITH ADAPTER (GRAM) INHALATION
Refills: 0 | Status: COMPLETED | OUTPATIENT
Start: 2023-11-18 | End: 2023-11-18

## 2023-11-18 RX ORDER — ALBUTEROL 90 UG/1
2 AEROSOL, METERED ORAL EVERY 6 HOURS
Refills: 0 | Status: DISCONTINUED | OUTPATIENT
Start: 2023-11-18 | End: 2023-11-18

## 2023-11-18 RX ORDER — ENOXAPARIN SODIUM 100 MG/ML
40 INJECTION SUBCUTANEOUS EVERY 24 HOURS
Refills: 0 | Status: DISCONTINUED | OUTPATIENT
Start: 2023-11-18 | End: 2023-11-22

## 2023-11-18 RX ORDER — ONDANSETRON 8 MG/1
4 TABLET, FILM COATED ORAL EVERY 8 HOURS
Refills: 0 | Status: DISCONTINUED | OUTPATIENT
Start: 2023-11-18 | End: 2023-11-22

## 2023-11-18 RX ORDER — BUDESONIDE AND FORMOTEROL FUMARATE DIHYDRATE 160; 4.5 UG/1; UG/1
2 AEROSOL RESPIRATORY (INHALATION)
Refills: 0 | Status: DISCONTINUED | OUTPATIENT
Start: 2023-11-18 | End: 2023-11-19

## 2023-11-18 RX ORDER — CEFTRIAXONE 500 MG/1
1000 INJECTION, POWDER, FOR SOLUTION INTRAMUSCULAR; INTRAVENOUS EVERY 24 HOURS
Refills: 0 | Status: DISCONTINUED | OUTPATIENT
Start: 2023-11-18 | End: 2023-11-18

## 2023-11-18 RX ORDER — INSULIN LISPRO 100/ML
VIAL (ML) SUBCUTANEOUS
Refills: 0 | Status: DISCONTINUED | OUTPATIENT
Start: 2023-11-18 | End: 2023-11-19

## 2023-11-18 RX ADMIN — MAGNESIUM OXIDE 400 MG ORAL TABLET 400 MILLIGRAM(S): 241.3 TABLET ORAL at 18:14

## 2023-11-18 RX ADMIN — SODIUM CHLORIDE 1000 MILLILITER(S): 9 INJECTION, SOLUTION INTRAVENOUS at 11:59

## 2023-11-18 RX ADMIN — MAGNESIUM OXIDE 400 MG ORAL TABLET 400 MILLIGRAM(S): 241.3 TABLET ORAL at 13:48

## 2023-11-18 RX ADMIN — BUDESONIDE AND FORMOTEROL FUMARATE DIHYDRATE 2 PUFF(S): 160; 4.5 AEROSOL RESPIRATORY (INHALATION) at 18:33

## 2023-11-18 RX ADMIN — LEVALBUTEROL 1.25 MILLIGRAM(S): 1.25 SOLUTION, CONCENTRATE RESPIRATORY (INHALATION) at 23:16

## 2023-11-18 RX ADMIN — Medication 4: at 21:12

## 2023-11-18 RX ADMIN — Medication 600 MILLIGRAM(S): at 20:56

## 2023-11-18 RX ADMIN — AZITHROMYCIN 255 MILLIGRAM(S): 500 TABLET, FILM COATED ORAL at 12:59

## 2023-11-18 RX ADMIN — SODIUM CHLORIDE 1000 MILLILITER(S): 9 INJECTION, SOLUTION INTRAVENOUS at 13:51

## 2023-11-18 RX ADMIN — Medication 100 MILLIGRAM(S): at 11:59

## 2023-11-18 RX ADMIN — Medication 400 MILLIGRAM(S): at 13:59

## 2023-11-18 RX ADMIN — LEVALBUTEROL 1.25 MILLIGRAM(S): 1.25 SOLUTION, CONCENTRATE RESPIRATORY (INHALATION) at 19:41

## 2023-11-18 RX ADMIN — Medication 6 MILLIGRAM(S): at 18:36

## 2023-11-18 NOTE — H&P ADULT - ASSESSMENT
58-year-old female past medical history dm , hypertension, hyperlipidemia, asthma presents for evaluation of shortness of breath.  Patient had a colonoscopy this morning and after waking up developed shortness of breath and wheezing.   colonoscopy was routine for abd pain and was told , all was ok on scope , pt also had EGD few weeks ago as well -- revealed gastritis   Patient went home and took prednisone without improvement.  Now presents with mild squeezing chest pain, shortness of breath and nonproductive cough.      # Asthma exacerbation  # PNA - bilateral opacities ? aspiration from colonoscopy    # h/o VANDANA   - IV steroids   - iv Levaquin ( pcn allergery ) and Iv zithro  - saline lock    - benzonate and guaifen prn for cough   - duonebs standing q6 ( lev-albuterol )  - monitor vitals   - pt refusing tyl due to " fatty liver " , will order motrin prn for fever with food   - strep and leg in urine   - dvt and gi ppx   - oob   - pulse ox q shift and supplemental oxygen prn     # ho DM/ HLD/ HTN   - on NO home meds as per pt and pmd aware   - all on diet control   - low fat / cc/ low NA diet     d/w / SRAA

## 2023-11-18 NOTE — H&P ADULT - SOCIAL HISTORY
[facility] IS TAKING CARE OF PATIENT AND ALL HER CARE WILL GO THROUGH [facility]  NOW    No

## 2023-11-18 NOTE — ED ADULT NURSE NOTE - IS THE PATIENT ABLE TO BE SCREENED?
Upper Respiratory Infection in Children   WHAT YOU NEED TO KNOW:   An upper respiratory infection is also called a cold  It can affect your child's nose, throat, ears, and sinuses  The common cold is usually not serious and does not need special treatment  A cold is caused by a virus and will not get better with antibiotics  Most children get about 5 to 8 colds each year  Your child's cold symptoms will be worst for the first 3 to 5 days  His or her cold should be gone in 7 to 14 days  Your child may continue to cough for 2 to 3 weeks  DISCHARGE INSTRUCTIONS:   Seek care immediately if:   · Your child's temperature reaches 105°F (40 6°C)  · Your child has trouble breathing or is breathing faster than usual      · Your child's lips or nails turn blue  · Your child's nostrils flare when he or she takes a breath  · The skin above or below your child's ribs is sucked in with each breath  · Your child's heart is beating much faster than usual      · You see pinpoint or larger reddish-purple dots on your child's skin  · Your child stops urinating or urinates less than usual      · Your baby's soft spot on his or her head is bulging outward or sunken inward  · Your child has a severe headache or stiff neck  · Your child has chest or stomach pain  · Your baby is too weak to eat  Contact your child's healthcare provider if:   · Your child has a rectal, ear, or forehead temperature higher than 100 4°F (38°C)  · Your child has an oral or pacifier temperature higher than 100°F (37 8°C)  · Your child has an armpit temperature higher than 99°F (37 2°C)  · Your child is younger than 2 years and has a fever for more than 24 hours  · Your child is 2 years or older and has a fever for more than 72 hours  · Your child has had thick nasal drainage for more than 2 days  · Your child has ear pain  · Your child has white spots on his or her tonsils       · Your child coughs up a lot of thick, yellow, or green mucus  · Your child is unable to eat, has nausea, or is vomiting  · Your child has increased tiredness and weakness  · Your child's symptoms do not improve or get worse within 3 days  · You have questions or concerns about your child's condition or care  Medicines:  Do not give over-the-counter (OTC) cough or cold medicines to children younger than 4 years  Your healthcare provider may tell you not to give these medicines to children younger than 6 years  OTC cough and cold medicines can cause side effects that may harm your child  Your child may need any of the following:  · Decongestants  help reduce nasal congestion in older children and help make breathing easier  If your child takes decongestant pills, they may make him or her feel restless or cause problems with sleep  Do not give your child decongestant sprays for more than a few days  · Cough suppressants  help reduce coughing in older children  Ask your child's healthcare provider which type of cough medicine is best for him or her  · Acetaminophen  decreases pain and fever  It is available without a doctor's order  Ask how much to give your child and how often to give it  Follow directions  Read the labels of all other medicines your child uses to see if they also contain acetaminophen, or ask your child's doctor or pharmacist  Acetaminophen can cause liver damage if not taken correctly  · NSAIDs , such as ibuprofen, help decrease swelling, pain, and fever  This medicine is available with or without a doctor's order  NSAIDs can cause stomach bleeding or kidney problems in certain people  If you take blood thinner medicine, always ask if NSAIDs are safe for you  Always read the medicine label and follow directions  Do not give these medicines to children under 10months of age without direction from your child's healthcare provider  · Do not give aspirin to children under 25years of age  Your child could develop Reye syndrome if he takes aspirin  Reye syndrome can cause life-threatening brain and liver damage  Check your child's medicine labels for aspirin, salicylates, or oil of wintergreen  · Give your child's medicine as directed  Contact your child's healthcare provider if you think the medicine is not working as expected  Tell him or her if your child is allergic to any medicine  Keep a current list of the medicines, vitamins, and herbs your child takes  Include the amounts, and when, how, and why they are taken  Bring the list or the medicines in their containers to follow-up visits  Carry your child's medicine list with you in case of an emergency  Follow up with your child's healthcare provider as directed:  Write down your questions so you remember to ask them during your child's visits  Care for your child:   · Have your child rest   Rest will help his or her body get better  · Give your child more liquids as directed  Liquids will help thin and loosen mucus so your child can cough it up  Liquids will also help prevent dehydration  Liquids that help prevent dehydration include water, fruit juice, and broth  Do not give your child liquids that contain caffeine  Caffeine can increase your child's risk for dehydration  Ask your child's healthcare provider how much liquid to give your child each day  · Clear mucus from your child's nose  Use a bulb syringe to remove mucus from a baby's nose  Squeeze the bulb and put the tip into one of your baby's nostrils  Gently close the other nostril with your finger  Slowly release the bulb to suck up the mucus  Empty the bulb syringe onto a tissue  Repeat the steps if needed  Do the same thing in the other nostril  Make sure your baby's nose is clear before he or she feeds or sleeps  Your child's healthcare provider may recommend you put saline drops into your baby's nose if the mucus is very thick             · Soothe your child's throat  If your child is 8 years or older, have him or her gargle with salt water  Make salt water by dissolving ¼ teaspoon salt in 1 cup warm water  · Soothe your child's cough  You can give honey to children older than 1 year  Give ½ teaspoon of honey to children 1 to 5 years  Give 1 teaspoon of honey to children 6 to 11 years  Give 2 teaspoons of honey to children 12 or older  · Use a cool-mist humidifier  This will add moisture to the air and help your child breathe easier  Make sure the humidifier is out of your child's reach  · Apply petroleum-based jelly around the outside of your child's nostrils  This can decrease irritation from blowing his or her nose  · Keep your child away from smoke  Do not smoke near your child  Do not let your older child smoke  Nicotine and other chemicals in cigarettes and cigars can make your child's symptoms worse  They can also cause infections such as bronchitis or pneumonia  Ask your child's healthcare provider for information if you or your child currently smoke and need help to quit  E-cigarettes or smokeless tobacco still contain nicotine  Talk to your healthcare provider before you or your child use these products  Prevent the spread of a cold:   · Keep your child away from other people during the first 3 to 5 days of his or her cold  The virus is spread most easily during this time  · Wash your hands and your child's hands often  Teach your child to cover his or her nose and mouth when he or she sneezes, coughs, and blows his or her nose  Show your child how to cough and sneeze into the crook of the elbow instead of the hands  · Do not let your child share toys, pacifiers, or towels with others while he or she is sick  · Do not let your child share foods, eating utensils, cups, or drinks with others while he or she is sick    © 2017 [address] Information is for End User's use only and may not be sold, redistributed or otherwise used for commercial purposes  All illustrations and images included in CareNotes® are the copyrighted property of [employer]  or [address]  The above information is an  only  It is not intended as medical advice for individual conditions or treatments  Talk to your doctor, nurse or pharmacist before following any medical regimen to see if it is safe and effective for you  Acute Cough in Children   WHAT YOU NEED TO KNOW:   An acute cough can last up to 3 weeks  Common causes of an acute cough include a cold, allergies, or a lung infection  DISCHARGE INSTRUCTIONS:   Call 911 for any of the following:   · Your child has difficulty breathing  · Your child faints  Return to the emergency department if:   · Your child's lips or fingernails turn dark or blue  · Your child is wheezing  · Your child is breathing fast:    ¨ More than 60 breaths in 1 minute for infants up to 3months of age    [de-identified] More than 50 breaths in 1 minute for infants 2 months to 1 year of age    Renelle Balint More than 40 breaths in 1 minute for a child 1 year and older    · The skin between your child's ribs or around his neck goes in with every breath  · Your child coughs up blood, or you see blood in his mucus  · Your child's cough gets worse, or it sounds like a barking cough  Contact your child's healthcare provider if:   · Your child has a fever  · Your child's cough lasts longer than 5 days  · Your child's cough does not get better with treatment  · You have questions or concerns about your child's condition or care  Medicines:   · Medicines  may be given to stop the cough, decrease swelling in your child's airways, or help open his or her airways  Medicine may also be given to help your child cough up mucus  If your child has an infection caused by bacteria, he or she may need antibiotics  Do not  give cough and cold medicine to a child younger than 4 years   Talk to your healthcare provider before you give cold and cough medicine to a child older than 4 years  · Take your medicine as directed  Contact your healthcare provider if you think your medicine is not helping or if you have side effects  Tell him or her if you are allergic to any medicine  Keep a list of the medicines, vitamins, and herbs you take  Include the amounts, and when and why you take them  Bring the list or the pill bottles to follow-up visits  Carry your medicine list with you in case of an emergency  Manage your child's cough:   · Keep your child away from others who smoke  Nicotine and other chemicals in cigarettes and cigars can make your child's cough worse  · Give your child extra liquids as directed  Liquids will help thin and loosen mucus so your child can cough it up  Liquids will also help prevent dehydration  Examples of liquids to give your child include water, fruit juice, and broth  Do not give your child liquids that contain caffeine  Caffeine can increase your child's risk for dehydration  Ask your child's healthcare provider how much liquid to drink each day  · Have your child rest as directed  Do not let your child do activities that make his or her cough worse, such as exercise  · Use a humidifier or vaporizer  Use a cool mist humidifier or a vaporizer to increase air moisture in your home  This may make it easier for your child to breathe and help decrease his or her cough  · Give your child honey as directed  Honey can help thin mucus and decrease your child's cough  Do not give honey to children less than 1 year of age  Give ½ teaspoon of honey to children 3to 11years of age  Give 1 teaspoon of honey to children 10to 6years of age  Give 2 teaspoons of honey to children 15years of age or older  If you give your child honey at bedtime, brush his or her teeth after  · Give your child a cough drop or lozenge if he or she is 4 years or older    These can help decrease throat irritation and your child's cough  Follow up with your child's healthcare provider as directed:  Write down your questions so you remember to ask them during your visits  © 2017 [facility] Information is for End User's use only and may not be sold, redistributed or otherwise used for commercial purposes  All illustrations and images included in CareNotes® are the copyrighted property of [employer]  or [employer] Gle[employer]  The above information is an  only  It is not intended as medical advice for individual conditions or treatments  Talk to your doctor, nurse or pharmacist before following any medical regimen to see if it is safe and effective for you  Yes

## 2023-11-18 NOTE — H&P ADULT - NSHPPHYSICALEXAM_GEN_ALL_CORE
Vital Signs Last 24 Hrs  T(C): 37.1 (18 Nov 2023 11:05), Max: 37.1 (18 Nov 2023 11:05)  T(F): 98.7 (18 Nov 2023 11:05), Max: 98.7 (18 Nov 2023 11:05)  HR: 118 (18 Nov 2023 11:05) (118 - 118)  BP: 124/85 (18 Nov 2023 11:05) (124/85 - 124/85)  RR: 22 (18 Nov 2023 11:05) (22 - 22)  SpO2: 90% (18 Nov 2023 11:05) (90% - 90%)    Parameters below as of 18 Nov 2023 11:05  Patient On (Oxygen Delivery Method): room air    CONST: NAD  EYES: Sclera and conjunctiva clear.   ENT: No nasal discharge. Oropharynx normal appearing, no erythema or exudates. No abscess or swelling. Uvula midline.   NECK: Non-tender, no meningeal signs. normal ROM. supple   CARD: S1 S2; No jvd  RESP: good AE with  wheezing.   GI: Soft, non-tender, non-distended. no cva tenderness. normal BS  MS: Normal ROM in all extremities. pulses 2 +. no calf tenderness or swelling  SKIN: Warm, dry, no acute rashes. Good turgor  NEURO: A&Ox3, No focal deficits. Strength 5/5 with no sensory deficits.

## 2023-11-18 NOTE — PATIENT PROFILE ADULT - FUNCTIONAL ASSESSMENT - BASIC MOBILITY 6.
4-calculated by average/Not able to assess (calculate score using [facility] averaging method)

## 2023-11-18 NOTE — H&P ADULT - NSHPLABSRESULTS_GEN_ALL_CORE
11-18    139  |  98  |  14  ----------------------------<  211<H>  4.0   |  27  |  0.9    Ca    9.9      18 Nov 2023 11:33  Mg     1.6     11-18    TPro  7.2  /  Alb  4.6  /  TBili  0.7  /  DBili  x   /  AST  45<H>  /  ALT  70<H>  /  AlkPhos  140<H>  11-18                            15.3   9.65  )-----------( 252      ( 18 Nov 2023 11:33 )             44.5     CAPILLARY BLOOD GLUCOSE        CARDIAC MARKERS ( 18 Nov 2023 11:33 )  x     / <0.01 ng/mL / x     / x     / x

## 2023-11-18 NOTE — H&P ADULT - NSICDXPASTMEDICALHX_GEN_ALL_CORE_FT
PAST MEDICAL HISTORY:  HLD (hyperlipidemia)     HTN (hypertension)      PAST MEDICAL HISTORY:  Asthma     Fatty liver     HLD (hyperlipidemia)     HTN (hypertension)

## 2023-11-18 NOTE — PATIENT PROFILE ADULT - STATED REASON FOR ADMISSION
[FreeTextEntry1] : 48 year old premenopausal female with a hx of stage 2 right breast cancer treated with bilateral mastectomy and right SLNB, currently on Tamoxifen and tolerating well.  CBE is benign. No evidence of recurrence at this time. Patient to follow up in 6 months for next CBE. Cont Endocrine therapy.   Patient verbalized understanding of all treatment plans. All of her questions were answered to the best of my ability. She was encouraged to call the office sooner for any questions or concerns.   PLAN 1. Follow up with Dr. Rubio/continue tamoxifen 2. Follow up in 6 months  Shortness of Breath

## 2023-11-18 NOTE — ED PROVIDER NOTE - CLINICAL SUMMARY MEDICAL DECISION MAKING FREE TEXT BOX
Pt presents with shortness of breath post colonoscopy. + fever. Possible aspiration pneumonia. treated with antibiotics.

## 2023-11-18 NOTE — ED ADULT NURSE NOTE - NSICDXPASTMEDICALHX_GEN_ALL_CORE_FT
PAST MEDICAL HISTORY:  HLD (hyperlipidemia)     HTN (hypertension)      PAST MEDICAL HISTORY:  Fatty liver     HLD (hyperlipidemia)     HTN (hypertension)

## 2023-11-18 NOTE — H&P ADULT - HISTORY OF PRESENT ILLNESS
58-year-old female past medical history hypertension, hyperlipidemia, asthma presents for evaluation of shortness of breath.  Patient had a colonoscopy this morning and after waking up developed shortness of breath and wheezing.  Patient went home and took prednisone without improvement.  Now presents with mild squeezing chest pain, shortness of breath and nonproductive cough.      Denies fever, headache, abdominal pain, weakness, numbness, dysuria, hematuria.,  Vomiting, diarrhea.    ER: de sat - cxr revealed pna , started iv abx and steroids  58-year-old female past medical history dm , hypertension, hyperlipidemia, asthma presents for evaluation of shortness of breath.  Patient had a colonoscopy this morning ( propofol )  and after waking up developed shortness of breath and wheezing.   colonoscopy was routine for abd pain and was told , all was ok on scope , pt also had EGD few weeks ago as well -- revealed gastritis   Patient went home and took prednisone without improvement.  Now presents with mild squeezing chest pain, shortness of breath and nonproductive cough.    KNOWN ALLERGY TO SULFER AND PCN   Of note pt is on no home medications and all is diet controlled , only take albuterol prn   Denies fever, headache, abdominal pain, weakness, numbness, dysuria, hematuria.,  Vomiting, diarrhea.    ER: de sat - cxr revealed pna , started iv abx and steroids

## 2023-11-18 NOTE — PATIENT PROFILE ADULT - COMPLETE THE FOLLOWING IF THE PATIENT REFUSES THE INFLUENZA VACCINE:
36.6 Risks/benefits discussed with patient/surrogate/Vaccine Information Sheet (VIS) provided-VIS date: 8/6/21

## 2023-11-18 NOTE — ED PROVIDER NOTE - OBJECTIVE STATEMENT
58-year-old female past medical history hypertension, hyperlipidemia, asthma presents for evaluation of shortness of breath.  Patient had a colonoscopy this morning and after waking up developed shortness of breath and wheezing.  Patient went home and took prednisone without improvement.  Now presents with mild squeezing chest pain, shortness of breath and nonproductive cough.  Denies fever, headache, abdominal pain, weakness, numbness, dysuria, hematuria.,  Vomiting, diarrhea.

## 2023-11-18 NOTE — H&P ADULT - NS ATTEND AMEND GEN_ALL_CORE FT
58F w/ Mild Intermittent Asthma, DM2(Diet-controlled), HTN(Diet Controlled), NAFLD p/w Acute Respiratory Failure with Hypoxia 2/2 Asthma Exacerbation 2/2 Aspiration Pneumonitis following outpatient colonoscopy    #Acute Respiratory failure with hypoxia  - Plan for Azithro x3d, Levaquin 750 x7d  - per patient request transition from Methylpred to Dexamethasone, for now Dex 6mg q12hr  - xopinex prn, start symbicort  - CXR w/ LLL hazy opacification following colonoscopy where patient was in Left Lateral Decubitus position-> therefore suspect perioperative aspiration event based upon hx from patient  - wean oxygen as tolerated    #Acute asthma exacerbation  - treatment as noted above    #Aspiration pneumonitis  - treatment as noted above    #Hypomagnesemia  - mgox    #Dm2  - diet controlled at home  - consistent carb diet inpatient    #HTN  - diet controlled at home    #Prophylactic measure   DVT ppx Lovenox    on my exam there is wheeze and poor air entry in LLL field, full sentences on supplemental O2, cardiac regular tachycardia [id number] no murmur, benign abdomen,    CXR on my personal review- LLL hazy opacification  EKG on my personal review- sinus tachycardia  CBC on my review- no leukocytosis, CMP- mild elevation ast/alt, RVP no virus detected

## 2023-11-18 NOTE — ED PROVIDER NOTE - SECONDARY DIAGNOSIS.
Past Medical History:   Diagnosis Date    Anemia     Asthma     CKD (chronic kidney disease), stage III     Dementia     Hypertension 5/28/2013    Pelvic mass in female     Pulmonary hypertension        Past Surgical History:   Procedure Laterality Date    ESOPHAGOGASTRODUODENOSCOPY      ESOPHAGOGASTRODUODENOSCOPY (EGD) N/A 12/20/2016    Performed by Eitan Coles MD at [facility] ([address] [place] [address])    HIP SURGERY Right        Review of patient's allergies indicates:  No Known Allergies    Family History     Problem Relation (Age of Onset)    Cancer Sister    Colon polyps Son    Diabetes Sister, Son    Heart disease Sister    Hypertension Mother        Tobacco Use    Smoking status: Former Smoker     Start date: 7/1/2007    Smokeless tobacco: Never Used   Substance and Sexual Activity    Alcohol use: No     Alcohol/week: 0.0 oz    Drug use: No    Sexual activity: Yes     Partners: Male      Review of Systems   Unable to perform ROS: Mental status change     Objective:     Vital Signs (Most Recent):  Temp: 97.9 °F (36.6 °C) (12/22/18 2217)  Pulse: 81 (12/23/18 0241)  Resp: 19 (12/23/18 0241)  BP: (!) 147/90 (12/23/18 0233)  SpO2: 98 % (12/23/18 0241) Vital Signs (24h Range):  Temp:  [97.9 °F (36.6 °C)] 97.9 °F (36.6 °C)  Pulse:  [77-86] 81  Resp:  [14-21] 19  SpO2:  [94 %-99 %] 98 %  BP: (113-167)/() 147/90   Weight: 34.5 kg (76 lb)  Body mass index is 13.9 kg/m².    No intake or output data in the 24 hours ending 12/23/18 0404    Physical Exam   Constitutional: No distress.   Severely frail & thin appearing elderly woman  Dry mucous membranes   Poor skin turgor   Skin tenting    HENT:   Head: Normocephalic and atraumatic.   Eyes:   Bilateral glaucoma   Neck: Normal range of motion. Neck supple. No JVD present. No thyromegaly present.   Cardiovascular: Normal rate, regular rhythm and normal heart sounds.   Pulmonary/Chest: Effort normal and breath sounds normal. No stridor. No respiratory distress. She  has no wheezes. She has no rales.   Abdominal: Soft. Bowel sounds are normal. She exhibits no distension. There is no tenderness. There is no guarding.   Musculoskeletal: She exhibits no edema, tenderness or deformity.   L. Elbow healing bruise    Neurological: She is alert.   Oriented to self; unable to follow commands or answer questions. Speaks in nonsensical phrases    Skin: She is not diaphoretic.       Vents:     Lines/Drains/Airways     Peripheral Intravenous Line                 Peripheral IV - Single Lumen 12/22/18 2301 Left Forearm less than 1 day              Significant Labs:    CBC/Anemia Profile:  Recent Labs   Lab 12/22/18 2301 12/23/18 0118   WBC 11.26 10.13   HGB 13.1 12.8   HCT 41.9 39.9    226   MCV 77* 76*   RDW 19.9* 19.9*        Chemistries:  Recent Labs   Lab 12/22/18 2301 12/23/18  0118   * 174*   K 5.8* 5.0   CL >130* >130*   CO2 19* 20*   * 112*   CREATININE 5.6* 5.6*   CALCIUM 11.0* 11.0*   ALBUMIN 3.8 3.7   PROT 8.8* 8.1   BILITOT 0.8 0.8   ALKPHOS 66 63   ALT 9* 7*   AST 22 12   MG 3.9* 3.4*       CMP:   Recent Labs   Lab 12/22/18 2301 12/23/18  0118   * 174*   K 5.8* 5.0   CL >130* >130*   CO2 19* 20*   * 108   * 112*   CREATININE 5.6* 5.6*   CALCIUM 11.0* 11.0*   PROT 8.8* 8.1   ALBUMIN 3.8 3.7   BILITOT 0.8 0.8   ALKPHOS 66 63   AST 22 12   ALT 9* 7*   ANIONGAP Unable to calculate Unable to calculate   EGFRNONAA 6.3* 6.3*     All pertinent labs within the past 24 hours have been reviewed.    Significant Imaging: I have reviewed and interpreted all pertinent imaging results/findings within the past 24 hours.   Pneumonia

## 2023-11-18 NOTE — ED ADULT TRIAGE NOTE - CHIEF COMPLAINT QUOTE
pt had a colonoscopy today, Pt shaking, sob , chest pain, wheezing and coughing. Pt taken prednisone 20 mg po

## 2023-11-18 NOTE — ED PROVIDER NOTE - NS ED MD DISPO ISOLATION TYPES
Indwelling Urinary Catheter Care, Adult    An indwelling urinary catheter is a thin tube that is put into your bladder. The tube helps to drain pee (urine) out of your body. The tube goes in through your urethra. Your urethra is where pee comes out of your body. Your pee will come out through the catheter, then it will go into a bag (drainage bag).    Take good care of your catheter so it will work well.    How to wear your catheter and bag    Supplies needed     •Sticky tape (adhesive tape) or a leg strap.    •Alcohol wipe or soap and water (if you use tape).    •A clean towel (if you use tape).    •Large overnight bag.    •Smaller bag (leg bag).    Wearing your catheter     Attach your catheter to your leg with tape or a leg strap.  •Make sure the catheter is not pulled tight.    •If a leg strap gets wet, take it off and put on a dry strap.    •If you use tape to hold the bag on your le.Use an alcohol wipe or soap and water to wash your skin where the tape made it sticky before.    2.Use a clean towel to pat-dry that skin.    3.Use new tape to make the bag stay on your leg.    Wearing your bags    You should have been given a large overnight bag.  •You may wear the overnight bag in the day or night.    •Always have the overnight bag lower than your bladder.  Do not let the bag touch the floor.    •Before you go to sleep, put a clean plastic bag in a wastebasket. Then hang the overnight bag inside the wastebasket.    You should also have a smaller leg bag that fits under your clothes.  •Always wear the leg bag below your knee.    •Do not wear your leg bag at night.    How to care for your skin and catheter    Supplies needed     •A clean washcloth.    •Water and mild soap.    •A clean towel.    Caring for your skin and catheter      •Clean the skin around your catheter every day:  1.Wash your hands with soap and water.    2.Wet a clean washcloth in warm water and mild soap.    3.Clean the skin around your urethra.•If you are female:  •Gently spread the folds of skin around your vagina (labia).    •With the washcloth in your other hand, wipe the inner side of your labia on each side. Wipe from front to back.    •If you are male:  •Pull back any skin that covers the end of your penis (foreskin).    •With the washcloth in your other hand, wipe your penis in small circles. Start wiping at the tip of your penis, then move away from the catheter.    •Move the foreskin back in place, if needed.    4.With your free hand, hold the catheter close to where it goes into your body.  •Keep holding the catheter during cleaning so it does not get pulled out.  5.With the washcloth in your other hand, clean the catheter.  •Only wipe downward on the catheter, toward the drainage bag.    •Do not wipe upward toward your body. Doing this may push germs into your urethra and cause infection.    6.Use a clean towel to pat-dry the catheter and the skin around it. Make sure to wipe off all soap.    7.Wash your hands with soap and water.    •Shower every day. Do not take baths.    • Do not use cream, ointment, or lotion on the area where the catheter goes into your body, unless your doctor tells you to.    • Do not use powders, sprays, or lotions on your genital area.    •Check your skin around the catheter every day for signs of infection. Check for:  •Redness, swelling, or pain.    •Fluid or blood.    •Warmth.    •Pus or a bad smell.    How to empty the bag    Supplies needed     •Rubbing alcohol.    •Gauze pad or cotton ball.    •Tape or a leg strap.    Emptying the bag     Pour the pee out of your bag when it is ?–½ full, or at least 2–3 times a day. Do this for your overnight bag and your leg bag.  1.Wash your hands with soap and water.    2.Separate (detach) the bag from your leg.    3.Hold the bag over the toilet or a clean pail. Keep the bag lower than your hips and bladder. This is so the pee (urine) does not go back into the tube.    4.Open the pour spout. It is at the bottom of the bag.    5.Empty the pee into the toilet or pail. Do not let the pour spout touch any surface.    6.Put rubbing alcohol on a gauze pad or cotton ball.    7.Use the gauze pad or cotton ball to clean the pour spout.    8.Close the pour spout.    9.Attach the bag to your leg with tape or a leg strap.    10.Wash your hands with soap and water.    Follow instructions for cleaning the drainage bag:  •From the product maker.    •As told by your doctor.    How to change the bag    Changing the bag     Replace your bag when it starts to leak, smell bad, or look dirty.  1.Wash your hands with soap and water.    2.Separate the dirty bag from your leg.    3.Pinch the catheter with your fingers so that pee does not spill out.    4.Separate the catheter tube from the bag tube where these tubes connect (at the connection valve). Do not let the tubes touch any surface.    5.Clean the end of the catheter tube with an alcohol wipe. Use a different alcohol wipe to clean the end of the bag tube.    6.Connect the catheter tube to the tube of the clean bag.    7.Attach the clean bag to your leg with tape or a leg strap. Do not make the bag tight on your leg.    8.Wash your hands with soap and water.    General rules  Washing hands with soap and water.   • Never pull on your catheter. Never try to take it out. Doing that can hurt you.    •Always wash your hands before and after you touch your catheter or bag. Use a mild, fragrance-free soap. If you do not have soap and water, use hand .    •Always make sure there are no twists or bends (kinks) in the catheter tube.    •Always make sure there are no leaks in the catheter or bag.    •Drink enough fluid to keep your pee pale yellow.    • Do not take baths, swim, or use a hot tub.    •If you are female, wipe from front to back after you poop (have a bowel movement).    Contact a doctor if:    •Your pee is cloudy.    •Your pee smells worse than usual.    •Your catheter gets clogged.    •Your catheter leaks.    •Your bladder feels full.    Get help right away if:    •You have redness, swelling, or pain where the catheter goes into your body.    •You have fluid, blood, pus, or a bad smell coming from the area where the catheter goes into your body.    •Your skin feels warm where the catheter goes into your body.    •You have a fever.    •You have pain in your:  •Belly (abdomen).    •Legs.    •Lower back.    •Bladder.    •You see blood in the catheter.    •Your pee is pink or red.    •You feel sick to your stomach (nauseous).    •You throw up (vomit).    •You have chills.    •Your pee is not draining into the bag.    •Your catheter gets pulled out.    Summary    •An indwelling urinary catheter is a thin tube that is placed into the bladder to help drain pee (urine) out of the body.     •The catheter is placed into the part of the body that drains pee from the bladder (urethra).    •Taking good care of your catheter will keep it working properly and help prevent problems.    •Always wash your hands before and after touching your catheter or bag.    • Never pull on your catheter or try to take it out.    This information is not intended to replace advice given to you by your health care provider. Make sure you discuss any questions you have with your health care provider. None

## 2023-11-18 NOTE — ED PROVIDER NOTE - PHYSICAL EXAMINATION
CONST: NAD  EYES: Sclera and conjunctiva clear.   ENT: No nasal discharge. Oropharynx normal appearing, no erythema or exudates. No abscess or swelling. Uvula midline.   NECK: Non-tender, no meningeal signs. normal ROM. supple   CARD: S1 S2; No jvd  RESP: Diffuse wheezing. spO2 90% on RA  GI: Soft, non-tender, non-distended. no cva tenderness. normal BS  MS: Normal ROM in all extremities. pulses 2 +. no calf tenderness or swelling  SKIN: Warm, dry, no acute rashes. Good turgor  NEURO: A&Ox3, No focal deficits. Strength 5/5 with no sensory deficits.

## 2023-11-19 DIAGNOSIS — G47.33 OBSTRUCTIVE SLEEP APNEA (ADULT) (PEDIATRIC): ICD-10-CM

## 2023-11-19 DIAGNOSIS — R00.0 TACHYCARDIA, UNSPECIFIED: ICD-10-CM

## 2023-11-19 LAB
ALBUMIN SERPL ELPH-MCNC: 4.1 G/DL — SIGNIFICANT CHANGE UP (ref 3.5–5.2)
ALBUMIN SERPL ELPH-MCNC: 4.1 G/DL — SIGNIFICANT CHANGE UP (ref 3.5–5.2)
ALP SERPL-CCNC: 104 U/L — SIGNIFICANT CHANGE UP (ref 30–115)
ALP SERPL-CCNC: 104 U/L — SIGNIFICANT CHANGE UP (ref 30–115)
ALT FLD-CCNC: 45 U/L — HIGH (ref 0–41)
ALT FLD-CCNC: 45 U/L — HIGH (ref 0–41)
ANION GAP SERPL CALC-SCNC: 10 MMOL/L — SIGNIFICANT CHANGE UP (ref 7–14)
ANION GAP SERPL CALC-SCNC: 10 MMOL/L — SIGNIFICANT CHANGE UP (ref 7–14)
ANION GAP SERPL CALC-SCNC: 13 MMOL/L — SIGNIFICANT CHANGE UP (ref 7–14)
ANION GAP SERPL CALC-SCNC: 13 MMOL/L — SIGNIFICANT CHANGE UP (ref 7–14)
AST SERPL-CCNC: 23 U/L — SIGNIFICANT CHANGE UP (ref 0–41)
AST SERPL-CCNC: 23 U/L — SIGNIFICANT CHANGE UP (ref 0–41)
BASOPHILS # BLD AUTO: 0.02 K/UL — SIGNIFICANT CHANGE UP (ref 0–0.2)
BASOPHILS # BLD AUTO: 0.02 K/UL — SIGNIFICANT CHANGE UP (ref 0–0.2)
BASOPHILS NFR BLD AUTO: 0.1 % — SIGNIFICANT CHANGE UP (ref 0–1)
BASOPHILS NFR BLD AUTO: 0.1 % — SIGNIFICANT CHANGE UP (ref 0–1)
BILIRUB SERPL-MCNC: 0.5 MG/DL — SIGNIFICANT CHANGE UP (ref 0.2–1.2)
BILIRUB SERPL-MCNC: 0.5 MG/DL — SIGNIFICANT CHANGE UP (ref 0.2–1.2)
BUN SERPL-MCNC: 16 MG/DL — SIGNIFICANT CHANGE UP (ref 10–20)
BUN SERPL-MCNC: 16 MG/DL — SIGNIFICANT CHANGE UP (ref 10–20)
BUN SERPL-MCNC: 21 MG/DL — HIGH (ref 10–20)
BUN SERPL-MCNC: 21 MG/DL — HIGH (ref 10–20)
CALCIUM SERPL-MCNC: 9.4 MG/DL — SIGNIFICANT CHANGE UP (ref 8.4–10.5)
CALCIUM SERPL-MCNC: 9.4 MG/DL — SIGNIFICANT CHANGE UP (ref 8.4–10.5)
CALCIUM SERPL-MCNC: 9.7 MG/DL — SIGNIFICANT CHANGE UP (ref 8.4–10.5)
CALCIUM SERPL-MCNC: 9.7 MG/DL — SIGNIFICANT CHANGE UP (ref 8.4–10.5)
CHLORIDE SERPL-SCNC: 101 MMOL/L — SIGNIFICANT CHANGE UP (ref 98–110)
CHLORIDE SERPL-SCNC: 101 MMOL/L — SIGNIFICANT CHANGE UP (ref 98–110)
CHLORIDE SERPL-SCNC: 98 MMOL/L — SIGNIFICANT CHANGE UP (ref 98–110)
CHLORIDE SERPL-SCNC: 98 MMOL/L — SIGNIFICANT CHANGE UP (ref 98–110)
CO2 SERPL-SCNC: 23 MMOL/L — SIGNIFICANT CHANGE UP (ref 17–32)
CO2 SERPL-SCNC: 23 MMOL/L — SIGNIFICANT CHANGE UP (ref 17–32)
CO2 SERPL-SCNC: 26 MMOL/L — SIGNIFICANT CHANGE UP (ref 17–32)
CO2 SERPL-SCNC: 26 MMOL/L — SIGNIFICANT CHANGE UP (ref 17–32)
CREAT SERPL-MCNC: 0.8 MG/DL — SIGNIFICANT CHANGE UP (ref 0.7–1.5)
CREAT SERPL-MCNC: 0.8 MG/DL — SIGNIFICANT CHANGE UP (ref 0.7–1.5)
CREAT SERPL-MCNC: 1.1 MG/DL — SIGNIFICANT CHANGE UP (ref 0.7–1.5)
CREAT SERPL-MCNC: 1.1 MG/DL — SIGNIFICANT CHANGE UP (ref 0.7–1.5)
EGFR: 59 ML/MIN/1.73M2 — LOW
EGFR: 59 ML/MIN/1.73M2 — LOW
EGFR: 86 ML/MIN/1.73M2 — SIGNIFICANT CHANGE UP
EGFR: 86 ML/MIN/1.73M2 — SIGNIFICANT CHANGE UP
EOSINOPHIL # BLD AUTO: 0 K/UL — SIGNIFICANT CHANGE UP (ref 0–0.7)
EOSINOPHIL # BLD AUTO: 0 K/UL — SIGNIFICANT CHANGE UP (ref 0–0.7)
EOSINOPHIL NFR BLD AUTO: 0 % — SIGNIFICANT CHANGE UP (ref 0–8)
EOSINOPHIL NFR BLD AUTO: 0 % — SIGNIFICANT CHANGE UP (ref 0–8)
GLUCOSE SERPL-MCNC: 253 MG/DL — HIGH (ref 70–99)
GLUCOSE SERPL-MCNC: 253 MG/DL — HIGH (ref 70–99)
GLUCOSE SERPL-MCNC: 473 MG/DL — CRITICAL HIGH (ref 70–99)
GLUCOSE SERPL-MCNC: 473 MG/DL — CRITICAL HIGH (ref 70–99)
HCT VFR BLD CALC: 38.9 % — SIGNIFICANT CHANGE UP (ref 37–47)
HCT VFR BLD CALC: 38.9 % — SIGNIFICANT CHANGE UP (ref 37–47)
HGB BLD-MCNC: 13.3 G/DL — SIGNIFICANT CHANGE UP (ref 12–16)
HGB BLD-MCNC: 13.3 G/DL — SIGNIFICANT CHANGE UP (ref 12–16)
IMM GRANULOCYTES NFR BLD AUTO: 0.4 % — HIGH (ref 0.1–0.3)
IMM GRANULOCYTES NFR BLD AUTO: 0.4 % — HIGH (ref 0.1–0.3)
LYMPHOCYTES # BLD AUTO: 0.81 K/UL — LOW (ref 1.2–3.4)
LYMPHOCYTES # BLD AUTO: 0.81 K/UL — LOW (ref 1.2–3.4)
LYMPHOCYTES # BLD AUTO: 5.5 % — LOW (ref 20.5–51.1)
LYMPHOCYTES # BLD AUTO: 5.5 % — LOW (ref 20.5–51.1)
MAGNESIUM SERPL-MCNC: 2.1 MG/DL — SIGNIFICANT CHANGE UP (ref 1.8–2.4)
MAGNESIUM SERPL-MCNC: 2.1 MG/DL — SIGNIFICANT CHANGE UP (ref 1.8–2.4)
MCHC RBC-ENTMCNC: 31.6 PG — HIGH (ref 27–31)
MCHC RBC-ENTMCNC: 31.6 PG — HIGH (ref 27–31)
MCHC RBC-ENTMCNC: 34.2 G/DL — SIGNIFICANT CHANGE UP (ref 32–37)
MCHC RBC-ENTMCNC: 34.2 G/DL — SIGNIFICANT CHANGE UP (ref 32–37)
MCV RBC AUTO: 92.4 FL — SIGNIFICANT CHANGE UP (ref 81–99)
MCV RBC AUTO: 92.4 FL — SIGNIFICANT CHANGE UP (ref 81–99)
MONOCYTES # BLD AUTO: 0.65 K/UL — HIGH (ref 0.1–0.6)
MONOCYTES # BLD AUTO: 0.65 K/UL — HIGH (ref 0.1–0.6)
MONOCYTES NFR BLD AUTO: 4.4 % — SIGNIFICANT CHANGE UP (ref 1.7–9.3)
MONOCYTES NFR BLD AUTO: 4.4 % — SIGNIFICANT CHANGE UP (ref 1.7–9.3)
NEUTROPHILS # BLD AUTO: 13.11 K/UL — HIGH (ref 1.4–6.5)
NEUTROPHILS # BLD AUTO: 13.11 K/UL — HIGH (ref 1.4–6.5)
NEUTROPHILS NFR BLD AUTO: 89.6 % — HIGH (ref 42.2–75.2)
NEUTROPHILS NFR BLD AUTO: 89.6 % — HIGH (ref 42.2–75.2)
NRBC # BLD: 0 /100 WBCS — SIGNIFICANT CHANGE UP (ref 0–0)
NRBC # BLD: 0 /100 WBCS — SIGNIFICANT CHANGE UP (ref 0–0)
PHOSPHATE SERPL-MCNC: 3.6 MG/DL — SIGNIFICANT CHANGE UP (ref 2.1–4.9)
PHOSPHATE SERPL-MCNC: 3.6 MG/DL — SIGNIFICANT CHANGE UP (ref 2.1–4.9)
PLATELET # BLD AUTO: 198 K/UL — SIGNIFICANT CHANGE UP (ref 130–400)
PLATELET # BLD AUTO: 198 K/UL — SIGNIFICANT CHANGE UP (ref 130–400)
PMV BLD: 11.4 FL — HIGH (ref 7.4–10.4)
PMV BLD: 11.4 FL — HIGH (ref 7.4–10.4)
POTASSIUM SERPL-MCNC: 4.4 MMOL/L — SIGNIFICANT CHANGE UP (ref 3.5–5)
POTASSIUM SERPL-MCNC: 4.4 MMOL/L — SIGNIFICANT CHANGE UP (ref 3.5–5)
POTASSIUM SERPL-MCNC: 5.1 MMOL/L — HIGH (ref 3.5–5)
POTASSIUM SERPL-MCNC: 5.1 MMOL/L — HIGH (ref 3.5–5)
POTASSIUM SERPL-SCNC: 4.4 MMOL/L — SIGNIFICANT CHANGE UP (ref 3.5–5)
POTASSIUM SERPL-SCNC: 4.4 MMOL/L — SIGNIFICANT CHANGE UP (ref 3.5–5)
POTASSIUM SERPL-SCNC: 5.1 MMOL/L — HIGH (ref 3.5–5)
POTASSIUM SERPL-SCNC: 5.1 MMOL/L — HIGH (ref 3.5–5)
PROT SERPL-MCNC: 6.6 G/DL — SIGNIFICANT CHANGE UP (ref 6–8)
PROT SERPL-MCNC: 6.6 G/DL — SIGNIFICANT CHANGE UP (ref 6–8)
RAPID RVP RESULT: SIGNIFICANT CHANGE UP
RAPID RVP RESULT: SIGNIFICANT CHANGE UP
RBC # BLD: 4.21 M/UL — SIGNIFICANT CHANGE UP (ref 4.2–5.4)
RBC # BLD: 4.21 M/UL — SIGNIFICANT CHANGE UP (ref 4.2–5.4)
RBC # FLD: 12.3 % — SIGNIFICANT CHANGE UP (ref 11.5–14.5)
RBC # FLD: 12.3 % — SIGNIFICANT CHANGE UP (ref 11.5–14.5)
SARS-COV-2 RNA SPEC QL NAA+PROBE: SIGNIFICANT CHANGE UP
SARS-COV-2 RNA SPEC QL NAA+PROBE: SIGNIFICANT CHANGE UP
SODIUM SERPL-SCNC: 134 MMOL/L — LOW (ref 135–146)
SODIUM SERPL-SCNC: 134 MMOL/L — LOW (ref 135–146)
SODIUM SERPL-SCNC: 137 MMOL/L — SIGNIFICANT CHANGE UP (ref 135–146)
SODIUM SERPL-SCNC: 137 MMOL/L — SIGNIFICANT CHANGE UP (ref 135–146)
WBC # BLD: 14.65 K/UL — HIGH (ref 4.8–10.8)
WBC # BLD: 14.65 K/UL — HIGH (ref 4.8–10.8)
WBC # FLD AUTO: 14.65 K/UL — HIGH (ref 4.8–10.8)
WBC # FLD AUTO: 14.65 K/UL — HIGH (ref 4.8–10.8)

## 2023-11-19 PROCEDURE — 71250 CT THORAX DX C-: CPT | Mod: 26

## 2023-11-19 PROCEDURE — 99233 SBSQ HOSP IP/OBS HIGH 50: CPT

## 2023-11-19 RX ORDER — INSULIN LISPRO 100/ML
3 VIAL (ML) SUBCUTANEOUS
Refills: 0 | Status: DISCONTINUED | OUTPATIENT
Start: 2023-11-19 | End: 2023-11-19

## 2023-11-19 RX ORDER — ALPRAZOLAM 0.25 MG
0.5 TABLET ORAL
Refills: 0 | Status: DISCONTINUED | OUTPATIENT
Start: 2023-11-19 | End: 2023-11-22

## 2023-11-19 RX ORDER — ETHACRYNIC ACID 25 MG/1
25 TABLET ORAL DAILY
Refills: 0 | Status: DISCONTINUED | OUTPATIENT
Start: 2023-11-19 | End: 2023-11-21

## 2023-11-19 RX ORDER — INSULIN GLARGINE 100 [IU]/ML
3 INJECTION, SOLUTION SUBCUTANEOUS AT BEDTIME
Refills: 0 | Status: DISCONTINUED | OUTPATIENT
Start: 2023-11-19 | End: 2023-11-19

## 2023-11-19 RX ORDER — DEXTROSE 50 % IN WATER 50 %
15 SYRINGE (ML) INTRAVENOUS ONCE
Refills: 0 | Status: DISCONTINUED | OUTPATIENT
Start: 2023-11-19 | End: 2023-11-21

## 2023-11-19 RX ORDER — DEXAMETHASONE 0.5 MG/5ML
6 ELIXIR ORAL ONCE
Refills: 0 | Status: COMPLETED | OUTPATIENT
Start: 2023-11-19 | End: 2023-11-19

## 2023-11-19 RX ORDER — DEXTROSE 50 % IN WATER 50 %
25 SYRINGE (ML) INTRAVENOUS ONCE
Refills: 0 | Status: DISCONTINUED | OUTPATIENT
Start: 2023-11-19 | End: 2023-11-21

## 2023-11-19 RX ORDER — SODIUM CHLORIDE 9 MG/ML
1000 INJECTION, SOLUTION INTRAVENOUS
Refills: 0 | Status: DISCONTINUED | OUTPATIENT
Start: 2023-11-19 | End: 2023-11-21

## 2023-11-19 RX ORDER — BUDESONIDE AND FORMOTEROL FUMARATE DIHYDRATE 160; 4.5 UG/1; UG/1
2 AEROSOL RESPIRATORY (INHALATION)
Refills: 0 | Status: DISCONTINUED | OUTPATIENT
Start: 2023-11-19 | End: 2023-11-22

## 2023-11-19 RX ORDER — INSULIN GLARGINE 100 [IU]/ML
18 INJECTION, SOLUTION SUBCUTANEOUS AT BEDTIME
Refills: 0 | Status: DISCONTINUED | OUTPATIENT
Start: 2023-11-19 | End: 2023-11-19

## 2023-11-19 RX ORDER — INSULIN LISPRO 100/ML
VIAL (ML) SUBCUTANEOUS
Refills: 0 | Status: DISCONTINUED | OUTPATIENT
Start: 2023-11-19 | End: 2023-11-21

## 2023-11-19 RX ORDER — DEXTROSE 50 % IN WATER 50 %
12.5 SYRINGE (ML) INTRAVENOUS ONCE
Refills: 0 | Status: DISCONTINUED | OUTPATIENT
Start: 2023-11-19 | End: 2023-11-21

## 2023-11-19 RX ORDER — INSULIN GLARGINE 100 [IU]/ML
10 INJECTION, SOLUTION SUBCUTANEOUS AT BEDTIME
Refills: 0 | Status: DISCONTINUED | OUTPATIENT
Start: 2023-11-19 | End: 2023-11-19

## 2023-11-19 RX ORDER — INSULIN GLARGINE 100 [IU]/ML
3 INJECTION, SOLUTION SUBCUTANEOUS ONCE
Refills: 0 | Status: COMPLETED | OUTPATIENT
Start: 2023-11-19 | End: 2023-11-19

## 2023-11-19 RX ORDER — INSULIN LISPRO 100/ML
10 VIAL (ML) SUBCUTANEOUS
Refills: 0 | Status: DISCONTINUED | OUTPATIENT
Start: 2023-11-19 | End: 2023-11-21

## 2023-11-19 RX ORDER — INSULIN GLARGINE 100 [IU]/ML
15 INJECTION, SOLUTION SUBCUTANEOUS AT BEDTIME
Refills: 0 | Status: DISCONTINUED | OUTPATIENT
Start: 2023-11-19 | End: 2023-11-20

## 2023-11-19 RX ORDER — GLUCAGON INJECTION, SOLUTION 0.5 MG/.1ML
1 INJECTION, SOLUTION SUBCUTANEOUS ONCE
Refills: 0 | Status: DISCONTINUED | OUTPATIENT
Start: 2023-11-19 | End: 2023-11-21

## 2023-11-19 RX ORDER — INSULIN LISPRO 100/ML
8 VIAL (ML) SUBCUTANEOUS ONCE
Refills: 0 | Status: COMPLETED | OUTPATIENT
Start: 2023-11-19 | End: 2023-11-19

## 2023-11-19 RX ORDER — IBUPROFEN 200 MG
400 TABLET ORAL EVERY 8 HOURS
Refills: 0 | Status: DISCONTINUED | OUTPATIENT
Start: 2023-11-19 | End: 2023-11-22

## 2023-11-19 RX ORDER — FAMOTIDINE 10 MG/ML
40 INJECTION INTRAVENOUS
Refills: 0 | Status: DISCONTINUED | OUTPATIENT
Start: 2023-11-19 | End: 2023-11-20

## 2023-11-19 RX ORDER — INSULIN LISPRO 100/ML
VIAL (ML) SUBCUTANEOUS AT BEDTIME
Refills: 0 | Status: DISCONTINUED | OUTPATIENT
Start: 2023-11-19 | End: 2023-11-21

## 2023-11-19 RX ADMIN — LEVALBUTEROL 1.25 MILLIGRAM(S): 1.25 SOLUTION, CONCENTRATE RESPIRATORY (INHALATION) at 07:51

## 2023-11-19 RX ADMIN — MAGNESIUM OXIDE 400 MG ORAL TABLET 400 MILLIGRAM(S): 241.3 TABLET ORAL at 07:51

## 2023-11-19 RX ADMIN — LEVALBUTEROL 1.25 MILLIGRAM(S): 1.25 SOLUTION, CONCENTRATE RESPIRATORY (INHALATION) at 12:19

## 2023-11-19 RX ADMIN — MAGNESIUM OXIDE 400 MG ORAL TABLET 400 MILLIGRAM(S): 241.3 TABLET ORAL at 19:22

## 2023-11-19 RX ADMIN — INSULIN GLARGINE 3 UNIT(S): 100 INJECTION, SOLUTION SUBCUTANEOUS at 12:36

## 2023-11-19 RX ADMIN — LEVALBUTEROL 1.25 MILLIGRAM(S): 1.25 SOLUTION, CONCENTRATE RESPIRATORY (INHALATION) at 23:20

## 2023-11-19 RX ADMIN — LEVALBUTEROL 1.25 MILLIGRAM(S): 1.25 SOLUTION, CONCENTRATE RESPIRATORY (INHALATION) at 16:59

## 2023-11-19 RX ADMIN — Medication 600 MILLIGRAM(S): at 06:08

## 2023-11-19 RX ADMIN — PANTOPRAZOLE SODIUM 40 MILLIGRAM(S): 20 TABLET, DELAYED RELEASE ORAL at 06:09

## 2023-11-19 RX ADMIN — Medication 0.5 MILLIGRAM(S): at 14:56

## 2023-11-19 RX ADMIN — Medication 3: at 07:56

## 2023-11-19 RX ADMIN — BUDESONIDE AND FORMOTEROL FUMARATE DIHYDRATE 2 PUFF(S): 160; 4.5 AEROSOL RESPIRATORY (INHALATION) at 07:51

## 2023-11-19 RX ADMIN — Medication 400 MILLIGRAM(S): at 16:03

## 2023-11-19 RX ADMIN — Medication 12: at 16:28

## 2023-11-19 RX ADMIN — Medication 0.5 MILLIGRAM(S): at 01:21

## 2023-11-19 RX ADMIN — Medication 8 UNIT(S): at 22:32

## 2023-11-19 RX ADMIN — Medication 6 MILLIGRAM(S): at 19:34

## 2023-11-19 RX ADMIN — LEVALBUTEROL 1.25 MILLIGRAM(S): 1.25 SOLUTION, CONCENTRATE RESPIRATORY (INHALATION) at 19:47

## 2023-11-19 RX ADMIN — Medication 600 MILLIGRAM(S): at 19:22

## 2023-11-19 RX ADMIN — Medication 4: at 21:21

## 2023-11-19 RX ADMIN — AZITHROMYCIN 255 MILLIGRAM(S): 500 TABLET, FILM COATED ORAL at 12:46

## 2023-11-19 RX ADMIN — ETHACRYNIC ACID 25 MILLIGRAM(S): 25 TABLET ORAL at 10:31

## 2023-11-19 RX ADMIN — Medication 0.5 MILLIGRAM(S): at 22:34

## 2023-11-19 RX ADMIN — Medication 6 MILLIGRAM(S): at 12:41

## 2023-11-19 RX ADMIN — INSULIN GLARGINE 15 UNIT(S): 100 INJECTION, SOLUTION SUBCUTANEOUS at 21:25

## 2023-11-19 RX ADMIN — Medication 6 MILLIGRAM(S): at 07:55

## 2023-11-19 NOTE — PROGRESS NOTE ADULT - SUBJECTIVE AND OBJECTIVE BOX
CC: 58F admitted w/ Acute Respiratory Failure with Hypoxia 2/2 Asthma Exacerbation 2/2 Aspiration Pneumonitis    SUBJECTIVE / OVERNIGHT EVENTS:  No acute events overnight. Patient seen and evaluated at bedside. The patient reports some improvement from yesterday but continues to have chest tightness with deep breath, reports less coughing up of sputum/foam, no hemoptysis. No fever this morning (noted had fever yesterday), no anterior chest pressure or palpitations, no n/v. Patient requests diuresis (she is concerned she developed propofol-induced pulmonary edema), CT chest and Pulmonology consultation. She continues to desire dexamethasone rather than solumederol dosing. She believes she is at risk for autoimmune lung disease as she is HLA-B27 positive.     ROS:  no diarrhea    MEDICATIONS  (STANDING):  azithromycin  IVPB 500 milliGRAM(s) IV Intermittent every 24 hours  budesonide  80 MICROgram(s)/formoterol 4.5 MICROgram(s) Inhaler 2 Puff(s) Inhalation two times a day  dexAMETHasone  Injectable 6 milliGRAM(s) IV Push every 12 hours  dextrose 5%. 1000 milliLiter(s) (100 mL/Hr) IV Continuous <Continuous>  dextrose 5%. 1000 milliLiter(s) (50 mL/Hr) IV Continuous <Continuous>  dextrose 50% Injectable 25 Gram(s) IV Push once  dextrose 50% Injectable 12.5 Gram(s) IV Push once  dextrose 50% Injectable 25 Gram(s) IV Push once  enoxaparin Injectable 40 milliGRAM(s) SubCutaneous every 24 hours  ethacrynic acid 25 milliGRAM(s) Oral daily  glucagon  Injectable 1 milliGRAM(s) IntraMuscular once  guaiFENesin  milliGRAM(s) Oral every 12 hours  insulin glargine Injectable (LANTUS) 3 Unit(s) SubCutaneous at bedtime  insulin lispro (ADMELOG) corrective regimen sliding scale   SubCutaneous three times a day before meals  insulin lispro (ADMELOG) corrective regimen sliding scale   SubCutaneous at bedtime  insulin lispro Injectable (ADMELOG) 3 Unit(s) SubCutaneous three times a day before meals  levalbuterol Inhalation 1.25 milliGRAM(s) Inhalation every 4 hours  levoFLOXacin IVPB 750 milliGRAM(s) IV Intermittent every 24 hours  magnesium oxide 400 milliGRAM(s) Oral three times a day with meals    MEDICATIONS  (PRN):  ALPRAZolam 0.5 milliGRAM(s) Oral at bedtime PRN anxiety  benzonatate 100 milliGRAM(s) Oral three times a day PRN Cough  dextrose Oral Gel 15 Gram(s) Oral once PRN Blood Glucose LESS THAN 70 milliGRAM(s)/deciliter  famotidine    Tablet 40 milliGRAM(s) Oral two times a day PRN For Acid Reflux  ibuprofen  Tablet. 400 milliGRAM(s) Oral every 8 hours PRN Temp greater or equal to 38C (100.4F)  ondansetron Injectable 4 milliGRAM(s) IV Push every 8 hours PRN Nausea and/or Vomiting      CAPILLARY BLOOD GLUCOSE      POCT Blood Glucose.: 324 mg/dL (19 Nov 2023 12:24)  POCT Blood Glucose.: 268 mg/dL (19 Nov 2023 07:31)  POCT Blood Glucose.: 346 mg/dL (19 Nov 2023 00:20)  POCT Blood Glucose.: 391 mg/dL (18 Nov 2023 22:31)  POCT Blood Glucose.: 410 mg/dL (18 Nov 2023 20:46)    I&O's Summary      Physical Exam  Vital Signs Last 24 Hrs  T(C): 37.3 (19 Nov 2023 14:07), Max: 38.3 (18 Nov 2023 14:27)  T(F): 99.1 (19 Nov 2023 14:07), Max: 101 (18 Nov 2023 14:27)  HR: 123 (19 Nov 2023 14:07) (92 - 123)  BP: 119/63 (19 Nov 2023 14:07) (103/70 - 141/76)  RR: 18 (19 Nov 2023 14:07) (18 - 18)  SpO2: 98% (19 Nov 2023 05:00) (95% - 98%)    Parameters below as of 19 Nov 2023 05:00  Patient On (Oxygen Delivery Method): nasal cannula    GENERAL: NAD  HEAD:  Atraumatic, Normocephalic  EYES: EOMI, PERRL, conjunctiva and sclera clear  ENMT: MMM, no angular cheilitis appreciated  NECK: Supple, trachea midline  Lung: normal work of breathing, wheeze present, LLL field improving from yesterday with now more air entry (has crackling which would be consistent with lung reexpansion)  Cardiovascular: S1&S2+, regular tachycardia approx 100 on exam, no m/r/g appreciated  ABDOMEN: soft, nt  : No gallardo catheter, no suprapubic pain to palpation  Neuro: Alert & follows commands, no flaccid paralysis in extremities appreciated  SKIN: warm and dry, no visible purulence in exposed areas    LABS:                        13.3   14.65 )-----------( 198      ( 19 Nov 2023 04:30 )             38.9     11-19    134<L>  |  98  |  16  ----------------------------<  253<H>  4.4   |  26  |  0.8    Ca    9.7      19 Nov 2023 04:30  Phos  3.6     11-19  Mg     2.1     11-19    TPro  6.6  /  Alb  4.1  /  TBili  0.5  /  DBili  x   /  AST  23  /  ALT  45<H>  /  AlkPhos  104  11-19    PT/INR - ( 18 Nov 2023 11:33 )   PT: 11.90 sec;   INR: 1.04 ratio         PTT - ( 18 Nov 2023 11:33 )  PTT:24.6 sec  CARDIAC MARKERS ( 18 Nov 2023 11:33 )  x     / <0.01 ng/mL / x     / x     / x          Urinalysis Basic - ( 19 Nov 2023 04:30 )    Color: x / Appearance: x / SG: x / pH: x  Gluc: 253 mg/dL / Ketone: x  / Bili: x / Urobili: x   Blood: x / Protein: x / Nitrite: x   Leuk Esterase: x / RBC: x / WBC x   Sq Epi: x / Non Sq Epi: x / Bacteria: x          RADIOLOGY & ADDITIONAL TESTS:  Results Reviewed:   Imaging Personally Reviewed: CT CHest has GGO b/l in middle/upper lung field, LLL atelectasis  Electrocardiogram Personally Reviewed:    COORDINATION OF CARE:  Care Discussed with Consultants/Other Providers [Y/N]:  Prior or Outpatient Records Reviewed [Y/N]:   CC: 58F admitted w/ Acute Respiratory Failure with Hypoxia 2/2 Asthma Exacerbation 2/2 Aspiration Pneumonitis    SUBJECTIVE / OVERNIGHT EVENTS:  No acute events overnight. Patient seen and evaluated at bedside. The patient reports some improvement from yesterday but continues to have chest tightness with deep breath, reports less coughing up of sputum/foam, no hemoptysis. No fever this morning (noted had fever yesterday but has since resolved), no anterior chest pressure or palpitations, no n/v. Patient requests diuresis (she is concerned she developed propofol-induced pulmonary edema), CT chest and Pulmonology consultation. She continues to desire dexamethasone rather than solumederol dosing. She believes she is at risk for autoimmune lung disease as she is HLA-B27 positive.     ROS:  no diarrhea    MEDICATIONS  (STANDING):  azithromycin  IVPB 500 milliGRAM(s) IV Intermittent every 24 hours  budesonide  80 MICROgram(s)/formoterol 4.5 MICROgram(s) Inhaler 2 Puff(s) Inhalation two times a day  dexAMETHasone  Injectable 6 milliGRAM(s) IV Push every 12 hours  dextrose 5%. 1000 milliLiter(s) (100 mL/Hr) IV Continuous <Continuous>  dextrose 5%. 1000 milliLiter(s) (50 mL/Hr) IV Continuous <Continuous>  dextrose 50% Injectable 25 Gram(s) IV Push once  dextrose 50% Injectable 12.5 Gram(s) IV Push once  dextrose 50% Injectable 25 Gram(s) IV Push once  enoxaparin Injectable 40 milliGRAM(s) SubCutaneous every 24 hours  ethacrynic acid 25 milliGRAM(s) Oral daily  glucagon  Injectable 1 milliGRAM(s) IntraMuscular once  guaiFENesin  milliGRAM(s) Oral every 12 hours  insulin glargine Injectable (LANTUS) 3 Unit(s) SubCutaneous at bedtime  insulin lispro (ADMELOG) corrective regimen sliding scale   SubCutaneous three times a day before meals  insulin lispro (ADMELOG) corrective regimen sliding scale   SubCutaneous at bedtime  insulin lispro Injectable (ADMELOG) 3 Unit(s) SubCutaneous three times a day before meals  levalbuterol Inhalation 1.25 milliGRAM(s) Inhalation every 4 hours  levoFLOXacin IVPB 750 milliGRAM(s) IV Intermittent every 24 hours  magnesium oxide 400 milliGRAM(s) Oral three times a day with meals    MEDICATIONS  (PRN):  ALPRAZolam 0.5 milliGRAM(s) Oral at bedtime PRN anxiety  benzonatate 100 milliGRAM(s) Oral three times a day PRN Cough  dextrose Oral Gel 15 Gram(s) Oral once PRN Blood Glucose LESS THAN 70 milliGRAM(s)/deciliter  famotidine    Tablet 40 milliGRAM(s) Oral two times a day PRN For Acid Reflux  ibuprofen  Tablet. 400 milliGRAM(s) Oral every 8 hours PRN Temp greater or equal to 38C (100.4F)  ondansetron Injectable 4 milliGRAM(s) IV Push every 8 hours PRN Nausea and/or Vomiting      CAPILLARY BLOOD GLUCOSE      POCT Blood Glucose.: 324 mg/dL (19 Nov 2023 12:24)  POCT Blood Glucose.: 268 mg/dL (19 Nov 2023 07:31)  POCT Blood Glucose.: 346 mg/dL (19 Nov 2023 00:20)  POCT Blood Glucose.: 391 mg/dL (18 Nov 2023 22:31)  POCT Blood Glucose.: 410 mg/dL (18 Nov 2023 20:46)    I&O's Summary      Physical Exam  Vital Signs Last 24 Hrs  T(C): 37.3 (19 Nov 2023 14:07), Max: 38.3 (18 Nov 2023 14:27)  T(F): 99.1 (19 Nov 2023 14:07), Max: 101 (18 Nov 2023 14:27)  HR: 123 (19 Nov 2023 14:07) (92 - 123)  BP: 119/63 (19 Nov 2023 14:07) (103/70 - 141/76)  RR: 18 (19 Nov 2023 14:07) (18 - 18)  SpO2: 98% (19 Nov 2023 05:00) (95% - 98%)    Parameters below as of 19 Nov 2023 05:00  Patient On (Oxygen Delivery Method): nasal cannula    GENERAL: NAD  HEAD:  Atraumatic, Normocephalic  EYES: EOMI, PERRL, conjunctiva and sclera clear  ENMT: MMM, no angular cheilitis appreciated  NECK: Supple, trachea midline  Lung: normal work of breathing, wheeze present, LLL field improving from yesterday with now more air entry (has crackling which would be consistent with lung reexpansion)  Cardiovascular: S1&S2+, regular tachycardia approx 100 on exam, no m/r/g appreciated  ABDOMEN: soft, nt  : No gallardo catheter, no suprapubic pain to palpation  Neuro: Alert & follows commands, no flaccid paralysis in extremities appreciated  SKIN: warm and dry, no visible purulence in exposed areas    LABS:                        13.3   14.65 )-----------( 198      ( 19 Nov 2023 04:30 )             38.9     11-19    134<L>  |  98  |  16  ----------------------------<  253<H>  4.4   |  26  |  0.8    Ca    9.7      19 Nov 2023 04:30  Phos  3.6     11-19  Mg     2.1     11-19    TPro  6.6  /  Alb  4.1  /  TBili  0.5  /  DBili  x   /  AST  23  /  ALT  45<H>  /  AlkPhos  104  11-19    PT/INR - ( 18 Nov 2023 11:33 )   PT: 11.90 sec;   INR: 1.04 ratio         PTT - ( 18 Nov 2023 11:33 )  PTT:24.6 sec  CARDIAC MARKERS ( 18 Nov 2023 11:33 )  x     / <0.01 ng/mL / x     / x     / x          Urinalysis Basic - ( 19 Nov 2023 04:30 )    Color: x / Appearance: x / SG: x / pH: x  Gluc: 253 mg/dL / Ketone: x  / Bili: x / Urobili: x   Blood: x / Protein: x / Nitrite: x   Leuk Esterase: x / RBC: x / WBC x   Sq Epi: x / Non Sq Epi: x / Bacteria: x          RADIOLOGY & ADDITIONAL TESTS:  Results Reviewed:   Imaging Personally Reviewed: CT CHest has GGO b/l in middle/upper lung field, LLL atelectasis  Electrocardiogram Personally Reviewed:    COORDINATION OF CARE:  Care Discussed with Consultants/Other Providers [Y/N]:  Prior or Outpatient Records Reviewed [Y/N]:   CC: 58F admitted w/ Acute Respiratory Failure with Hypoxia 2/2 Asthma Exacerbation 2/2 Suspected Aspiration Pneumonitis    SUBJECTIVE / OVERNIGHT EVENTS:  No acute events overnight. Patient seen and evaluated at bedside. The patient reports some improvement from yesterday but continues to have chest tightness with deep breath, reports less coughing up of sputum/foam, no hemoptysis. No fever this morning (noted had fever yesterday but has since resolved), no anterior chest pressure or palpitations, no n/v. Patient requests diuresis (she is concerned she developed propofol-induced pulmonary edema), CT chest and Pulmonology consultation. She continues to desire dexamethasone rather than solumederol dosing. She believes she is at risk for autoimmune lung disease as she is HLA-B27 positive.     ROS:  no diarrhea    MEDICATIONS  (STANDING):  azithromycin  IVPB 500 milliGRAM(s) IV Intermittent every 24 hours  budesonide  80 MICROgram(s)/formoterol 4.5 MICROgram(s) Inhaler 2 Puff(s) Inhalation two times a day  dexAMETHasone  Injectable 6 milliGRAM(s) IV Push every 12 hours  dextrose 5%. 1000 milliLiter(s) (100 mL/Hr) IV Continuous <Continuous>  dextrose 5%. 1000 milliLiter(s) (50 mL/Hr) IV Continuous <Continuous>  dextrose 50% Injectable 25 Gram(s) IV Push once  dextrose 50% Injectable 12.5 Gram(s) IV Push once  dextrose 50% Injectable 25 Gram(s) IV Push once  enoxaparin Injectable 40 milliGRAM(s) SubCutaneous every 24 hours  ethacrynic acid 25 milliGRAM(s) Oral daily  glucagon  Injectable 1 milliGRAM(s) IntraMuscular once  guaiFENesin  milliGRAM(s) Oral every 12 hours  insulin glargine Injectable (LANTUS) 3 Unit(s) SubCutaneous at bedtime  insulin lispro (ADMELOG) corrective regimen sliding scale   SubCutaneous three times a day before meals  insulin lispro (ADMELOG) corrective regimen sliding scale   SubCutaneous at bedtime  insulin lispro Injectable (ADMELOG) 3 Unit(s) SubCutaneous three times a day before meals  levalbuterol Inhalation 1.25 milliGRAM(s) Inhalation every 4 hours  levoFLOXacin IVPB 750 milliGRAM(s) IV Intermittent every 24 hours  magnesium oxide 400 milliGRAM(s) Oral three times a day with meals    MEDICATIONS  (PRN):  ALPRAZolam 0.5 milliGRAM(s) Oral at bedtime PRN anxiety  benzonatate 100 milliGRAM(s) Oral three times a day PRN Cough  dextrose Oral Gel 15 Gram(s) Oral once PRN Blood Glucose LESS THAN 70 milliGRAM(s)/deciliter  famotidine    Tablet 40 milliGRAM(s) Oral two times a day PRN For Acid Reflux  ibuprofen  Tablet. 400 milliGRAM(s) Oral every 8 hours PRN Temp greater or equal to 38C (100.4F)  ondansetron Injectable 4 milliGRAM(s) IV Push every 8 hours PRN Nausea and/or Vomiting      CAPILLARY BLOOD GLUCOSE      POCT Blood Glucose.: 324 mg/dL (19 Nov 2023 12:24)  POCT Blood Glucose.: 268 mg/dL (19 Nov 2023 07:31)  POCT Blood Glucose.: 346 mg/dL (19 Nov 2023 00:20)  POCT Blood Glucose.: 391 mg/dL (18 Nov 2023 22:31)  POCT Blood Glucose.: 410 mg/dL (18 Nov 2023 20:46)    I&O's Summary      Physical Exam  Vital Signs Last 24 Hrs  T(C): 37.3 (19 Nov 2023 14:07), Max: 38.3 (18 Nov 2023 14:27)  T(F): 99.1 (19 Nov 2023 14:07), Max: 101 (18 Nov 2023 14:27)  HR: 123 (19 Nov 2023 14:07) (92 - 123)  BP: 119/63 (19 Nov 2023 14:07) (103/70 - 141/76)  RR: 18 (19 Nov 2023 14:07) (18 - 18)  SpO2: 98% (19 Nov 2023 05:00) (95% - 98%)    Parameters below as of 19 Nov 2023 05:00  Patient On (Oxygen Delivery Method): nasal cannula    GENERAL: NAD  HEAD:  Atraumatic, Normocephalic  EYES: EOMI, PERRL, conjunctiva and sclera clear  ENMT: MMM, no angular cheilitis appreciated  NECK: Supple, trachea midline  Lung: normal work of breathing, wheeze present, LLL field improving from yesterday with now more air entry (has crackling which would be consistent with lung reexpansion)  Cardiovascular: S1&S2+, regular tachycardia approx 100 on exam, no m/r/g appreciated  ABDOMEN: soft, nt  : No gallardo catheter, no suprapubic pain to palpation  Neuro: Alert & follows commands, no flaccid paralysis in extremities appreciated  SKIN: warm and dry, no visible purulence in exposed areas    LABS:                        13.3   14.65 )-----------( 198      ( 19 Nov 2023 04:30 )             38.9     11-19    134<L>  |  98  |  16  ----------------------------<  253<H>  4.4   |  26  |  0.8    Ca    9.7      19 Nov 2023 04:30  Phos  3.6     11-19  Mg     2.1     11-19    TPro  6.6  /  Alb  4.1  /  TBili  0.5  /  DBili  x   /  AST  23  /  ALT  45<H>  /  AlkPhos  104  11-19    PT/INR - ( 18 Nov 2023 11:33 )   PT: 11.90 sec;   INR: 1.04 ratio         PTT - ( 18 Nov 2023 11:33 )  PTT:24.6 sec  CARDIAC MARKERS ( 18 Nov 2023 11:33 )  x     / <0.01 ng/mL / x     / x     / x          Urinalysis Basic - ( 19 Nov 2023 04:30 )    Color: x / Appearance: x / SG: x / pH: x  Gluc: 253 mg/dL / Ketone: x  / Bili: x / Urobili: x   Blood: x / Protein: x / Nitrite: x   Leuk Esterase: x / RBC: x / WBC x   Sq Epi: x / Non Sq Epi: x / Bacteria: x          RADIOLOGY & ADDITIONAL TESTS:  Results Reviewed:   Imaging Personally Reviewed: CT CHest has GGO b/l in middle/upper lung field, LLL atelectasis  Electrocardiogram Personally Reviewed:    COORDINATION OF CARE:  Care Discussed with Consultants/Other Providers [Y/N]:  Prior or Outpatient Records Reviewed [Y/N]:

## 2023-11-19 NOTE — PROGRESS NOTE ADULT - ASSESSMENT
58F w/ Mild Intermittent Asthma, DM2(Diet-controlled), HTN(Diet Controlled), NAFLD p/w Acute Respiratory Failure with Hypoxia 2/2 Asthma Exacerbation 2/2 Aspiration Pneumonitis following outpatient colonoscopy    #Acute Respiratory failure with hypoxia  - clinically improving from admission exam- less wheeze and better air entry in LLL field  - Plan for Azithro x3d (11/18->11/20), Levaquin 750 x7d (11/18->11/24)  - per patient request transition from Methylpred to Dexamethasone- based on exam today will treat with dexamethasone 6mg q8hr today, titrate down to dexamethasone 6mg q12 hr tomorrow  - c/w xopinex prn, symbicort  - CXR w/ LLL hazy opacification following colonoscopy where patient was in Left Lateral Decubitus position-> therefore suspect perioperative aspiration event based upon hx from patient at time of admission  - CT Chest completed w/ GGO in b/l Upper Lung fields. LLL with some basilar atelectasis on my review. GGO's may represent pulmonary edema 2/2 reactive airway disease? LLL field appears improved compared to CXR from time of admission so may represent improving aspiration pneumonitis  - Initial Viral panel (COVID/Flu/RSV) negative, will collect RVP to further assess. additionally would complete antibiotics as noted above to cover for atypical bacterial pneumonia however doubt as etiology  - wean oxygen as tolerated  - Pulm consult placed    #Acute asthma exacerbation  - treatment as noted above    #Aspiration pneumonitis  - treatment as noted above    #Sinus Tachycardia  - 2/2 steroid and b agonist use  - continue to clinically monitor    #Dm2, not controlled  - diet controlled at home  - consistent carb diet inpatient  - start basal-bolus insulin regimen as the patient is not controlled in setting of steroid requirement    #Hypomagnesemia  - resolved  - c/w mgox    #HTN  - diet controlled at home    #Prophylactic measure   DVT ppx Lovenox 58F w/ Mild Intermittent Asthma, DM2(Diet-controlled), HTN(Diet Controlled), NAFLD p/w Acute Respiratory Failure with Hypoxia 2/2 Asthma Exacerbation 2/2 Aspiration Pneumonitis following outpatient colonoscopy    #Acute Respiratory failure with hypoxia  - clinically improving from admission exam- less wheeze and better air entry in LLL field  - Plan for Azithro x3d (11/18->11/20), Levaquin 750 x7d (11/18->11/24)  - per patient request transition from Methylpred to Dexamethasone- based on exam today will treat with dexamethasone 6mg q8hr today, titrate down to dexamethasone 6mg q12 hr tomorrow  - c/w xopinex prn, symbicort  - CXR w/ LLL hazy opacification following colonoscopy where patient was in Left Lateral Decubitus position-> therefore suspect perioperative aspiration event based upon hx from patient at time of admission  - CT Chest completed w/ GGO in b/l Upper Lung fields. LLL with some basilar atelectasis on my review. GGO's may represent pulmonary edema 2/2 reactive airway disease? LLL field appears improved compared to CXR from time of admission so may represent improving aspiration pneumonitis  - Initial Viral panel (COVID/Flu/RSV) negative, will collect RVP to further assess. additionally would complete antibiotics as noted above to cover for atypical bacterial pneumonia however doubt as etiology  - wean oxygen as tolerated  - Pulm consult placed    #Acute asthma exacerbation  - treatment as noted above    #Aspiration pneumonitis  - treatment as noted above    #Sinus Tachycardia  - 2/2 steroid and b agonist use  - continue to clinically monitor    #Dm2, not controlled  - diet controlled at home  - consistent carb diet inpatient  - start basal-bolus insulin regimen as the patient is not controlled in setting of steroid requirement    #Hypomagnesemia  - resolved  - c/w mgox    #HTN  - diet controlled at home    #Anxiety  - c/w alprazolam prn  iSTOP#: [id number]  06/07/2023	06/07/2023	alprazolam 0.5 mg tablet	120	30    #Prophylactic measure   DVT ppx Lovenox 58F w/ Mild Intermittent Asthma, VANDANA on CPAP, DM2(Diet-controlled), HTN(Diet Controlled), NAFLD p/w Acute Respiratory Failure with Hypoxia 2/2 Asthma Exacerbation 2/2 Aspiration Pneumonitis following outpatient colonoscopy    #Acute Respiratory failure with hypoxia  - clinically improving from admission exam- less wheeze and better air entry in LLL field  - Plan for Azithro x3d (11/18->11/20), Levaquin 750 x7d (11/18->11/24)  - per patient request transition from Methylpred to Dexamethasone- based on exam today will treat with dexamethasone 6mg q8hr today, titrate down to dexamethasone 6mg q12 hr tomorrow  - c/w xopinex prn, symbicort  - CXR w/ LLL hazy opacification following colonoscopy where patient was in Left Lateral Decubitus position-> therefore suspect perioperative aspiration event based upon hx from patient at time of admission  - CT Chest completed w/ GGO in b/l Upper Lung fields. LLL with some basilar atelectasis on my review. GGO's may represent pulmonary edema 2/2 reactive airway disease? LLL field appears improved compared to CXR from time of admission so may represent improving aspiration pneumonitis  - Initial Viral panel (COVID/Flu/RSV) negative, will collect RVP to further assess. additionally would complete antibiotics as noted above to cover for atypical bacterial pneumonia however doubt as etiology  - wean oxygen as tolerated  - Pulm consult placed    #Acute asthma exacerbation  - treatment as noted above    #Aspiration pneumonitis  - treatment as noted above    #Sinus Tachycardia  - 2/2 steroid and b agonist use  - continue to clinically monitor    #Dm2, not controlled  - diet controlled at home  - consistent carb diet inpatient  - start basal-bolus insulin regimen as the patient is not controlled in setting of steroid requirement    #Hypomagnesemia  - resolved  - c/w mgox    #HTN  - diet controlled at home    #Anxiety  - c/w alprazolam prn  iSTOP#: [id number]  06/07/2023	06/07/2023	alprazolam 0.5 mg tablet	120	30    #Prophylactic measure   DVT ppx Lovenox 58F w/ Mild Intermittent Asthma, VANDANA on CPAP, DM2(Diet-controlled), HTN(Diet Controlled), NAFLD p/w Acute Respiratory Failure with Hypoxia 2/2 Asthma Exacerbation 2/2 suspected Aspiration Pneumonitis following outpatient colonoscopy    #Acute Respiratory failure with hypoxia  - reported acute respiratory failure following waking from sedation related to colonoscopy w/ sputum production (fluid/foam/yellow mucous; denies armin hemoptysis; patient reports no respiratory disease prior to procedure)  - clinically improving from admission exam- less wheeze and better air entry in LLL field  - CT Chest completed w/ GGO in b/l Mid/Upper Lung fields. LLL with some basilar atelectasis on my review. GGO's may represent pulmonary edema 2/2 reactive airway disease or reexpansion after atelectasis (patient reports hx of VANDANA and she reports that propofol was given without intubation of LMA), LLL field appears improved compared to CXR from time of admission so may represent improving aspiration pneumonitis if occurred. ddx for ggo is broad including but not limited to viral disease, atypical bacterial pneumonia for acute disease. It may also be possible that ggo are subacute/chronic and should have f/u CT Chest to determine resolution after acute phase.  - Initial Viral panel (COVID/Flu/RSV) negative, will collect RVP to further assess. Continue with plan for Azithro x3d (11/18->11/20), Levaquin 750 x7d (11/18->11/24).  - per patient request transition from Methylpred to Dexamethasone- based on exam today will treat with dexamethasone 6mg q8hr today, titrate down to dexamethasone 6mg q12 hr tomorrow  - c/w xopinex prn, symbicort  - wean oxygen as tolerated  - Pulm consult placed    #Acute asthma exacerbation  - treatment as noted above    #Aspiration pneumonitis  - treatment as noted above    #Sinus Tachycardia  - suspect at this time 2/2 steroid and b agonist use  - continue to clinically monitor    #Dm2, not controlled  - diet controlled at home  - consistent carb diet inpatient  - start basal-bolus insulin regimen w/ moderate sliding scale as the patient is not controlled in setting of steroid requirement    #Hypomagnesemia  - resolved  - c/w mgox    #HTN  - diet controlled at home    #Anxiety  - c/w alprazolam prn  iSTOP#: [id number]  06/07/2023	06/07/2023	alprazolam 0.5 mg tablet	120	30    #Prophylactic measure   DVT ppx Lovenox 58F w/ Mild Intermittent Asthma, VANDANA on CPAP, DM2(Diet-controlled), HTN(Diet Controlled), NAFLD p/w Acute Respiratory Failure with Hypoxia 2/2 Asthma Exacerbation 2/2 suspected Aspiration Pneumonitis following outpatient colonoscopy    #Acute Respiratory failure with hypoxia  - reported acute respiratory failure following waking from sedation related to colonoscopy w/ sputum production (fluid/foam/yellow mucous; denies armin hemoptysis; patient reports no acute respiratory disease prior to procedure)  - clinically improving from admission exam- less wheeze and better air entry in LLL field. Patient reported yesterday having tightness that she would experiencing her reactive airway disease and now feels some improvement after steroids  - CT Chest completed w/ GGO in b/l Mid/Upper Lung fields. LLL with some basilar atelectasis on my review. GGO's may represent pulmonary edema 2/2 reactive airway disease or reexpansion after atelectasis (patient reports hx of VANDANA and she reports that propofol was given without intubation of LMA), LLL field appears improved compared to CXR from time of admission so may represent improving aspiration pneumonitis if occurred. ddx for ggo is broad including but not limited to viral disease, atypical bacterial pneumonia for acute disease. It may also be possible that ggo are subacute/chronic and should have f/u CT Chest to determine resolution after acute phase.  - Initial Viral panel (COVID/Flu/RSV) negative, will collect RVP to further assess. Continue with plan for Azithro x3d (11/18->11/20), Levaquin 750 x7d (11/18->11/24).  - per patient request transition from Methylpred to Dexamethasone- based on exam today will treat with dexamethasone 6mg q8hr today, titrate down to dexamethasone 6mg q12 hr tomorrow  - c/w xopinex prn, symbicort  - ethacrynic acid started to help if edema a compenent  - wean oxygen as tolerated  - Pulm consult placed    #Acute asthma exacerbation  - treatment as noted above    #Aspiration pneumonitis  - as noted initial suspicion for aspiration, LLL opacification likely atelectasis rather than pneumonia. not clear pneumonitis occurred and improving with steroid  - treatment as noted above    #Sinus Tachycardia  - suspect at this time 2/2 steroid and b agonist use  - patient anxious, start reported home dosing of xanax QID prn  - CT chest reporting b/l GGO and basilar atelectasis (on exam better air entry since admission)  - continue to clinically monitor    #Dm2, not controlled  - diet controlled at home  - consistent carb diet inpatient  - start basal-bolus insulin regimen w/ moderate sliding scale as the patient is not controlled in setting of steroid requirement    #Hypomagnesemia  - resolved  - c/w mgox    #HTN  - diet controlled at home    #Anxiety  - c/w alprazolam prn  iSTOP#: [id number]  06/07/2023	06/07/2023	alprazolam 0.5 mg tablet	120	30    #Prophylactic measure   DVT ppx Lovenox 58F w/ Mild Intermittent Asthma, VANDANA on CPAP, DM2(Diet-controlled), HTN(Diet Controlled), NAFLD p/w Acute Respiratory Failure with Hypoxia 2/2 Asthma Exacerbation 2/2 suspected Aspiration Pneumonitis following outpatient colonoscopy    #Acute Respiratory failure with hypoxia  - reported acute respiratory failure following waking from sedation related to colonoscopy w/ sputum production (fluid/foam/yellow mucous; denies armin hemoptysis; patient reports no acute respiratory disease prior to procedure)  - clinically improving from admission exam- less wheeze and better air entry in LLL field. Patient reported yesterday having tightness that she would experiencing her reactive airway disease and now feels some improvement after steroids  - CT Chest completed w/ GGO in b/l Mid/Upper Lung fields. LLL with some basilar atelectasis on my review. GGO's may represent pulmonary edema 2/2 reactive airway disease or reexpansion after atelectasis (patient reports hx of VANDANA and she reports that propofol was given without intubation of LMA), LLL field appears improved compared to CXR from time of admission so may represent improving aspiration pneumonitis(if occurred)/Resolving LLL atelectasis. ddx for ggo is broad including but not limited to viral disease, atypical bacterial pneumonia for acute disease. It may also be possible that ggo are subacute/chronic and should have f/u CT Chest to determine resolution after acute phase.  - Initial Viral panel (COVID/Flu/RSV) negative, will collect RVP to further assess. Continue with plan for Azithro x3d (11/18->11/20), Levaquin 750 x7d (11/18->11/24).  - per patient request transition from Methylpred to Dexamethasone- based on exam today will treat with dexamethasone 6mg q8hr today, titrate down to dexamethasone 6mg q12 hr tomorrow  - c/w xopinex prn, symbicort  - ethacrynic acid started to help if edema a component  - wean oxygen as tolerated  - Pulm consult placed    #Acute asthma exacerbation  - treatment as noted above    #Aspiration pneumonitis  - as noted initial suspicion for aspiration, LLL opacification likely atelectasis rather than pneumonia. not clear pneumonitis occurred and improving with steroid  - treatment as noted above    #Sinus Tachycardia  - suspect at this time 2/2 steroid and b agonist use  - patient anxious, start reported home dosing of xanax QID prn  - CT chest reporting b/l GGO and basilar atelectasis (on exam better air entry since admission)  - continue to clinically monitor    #Dm2, not controlled  - diet controlled at home  - consistent carb diet inpatient  - start basal-bolus insulin regimen w/ moderate sliding scale as the patient is not controlled in setting of steroid requirement    #Hypomagnesemia  - resolved  - c/w mgox    #HTN  - diet controlled at home    #Anxiety  - c/w alprazolam prn  iSTOP#: [id number]  06/07/2023	06/07/2023	alprazolam 0.5 mg tablet	120	30    #Prophylactic measure   DVT ppx Lovenox 58F w/ Mild Intermittent Asthma, VANDANA on CPAP, DM2(Diet-controlled), HTN(Diet Controlled), NAFLD p/w Acute Respiratory Failure with Hypoxia 2/2 Asthma Exacerbation 2/2 suspected Aspiration Pneumonitis following outpatient colonoscopy    #Acute Respiratory failure with hypoxia  - reported acute respiratory failure following waking from sedation for outpatient colonoscopy w/ sputum production (fluid/foam/yellow mucous; denies armin hemoptysis; patient reports no acute respiratory disease prior to procedure)  - clinically improving from admission exam- less wheeze and better air entry in LLL field. Patient reported yesterday having tightness that she would experiencing her reactive airway disease and now feels some improvement after steroids  - CT Chest completed w/ GGO in b/l Mid/Upper Lung fields. LLL with some basilar atelectasis on my review. GGO's may represent pulmonary edema 2/2 reactive airway disease or reexpansion after atelectasis (patient reports hx of VANDANA and she reports that propofol was given without intubation of LMA), LLL field appears improved compared to CXR from time of admission so may represent improving aspiration pneumonitis(if occurred)/Resolving LLL atelectasis. ddx for ggo is broad including but not limited to viral disease, atypical bacterial pneumonia for acute disease. It may also be possible that ggo are subacute/chronic and should have f/u CT Chest to determine resolution after acute phase.  - Initial Viral panel (COVID/Flu/RSV) negative, will collect RVP to further assess. Continue with plan for Azithro x3d (11/18->11/20), Levaquin 750 x7d (11/18->11/24).  - per patient request transition from Methylpred to Dexamethasone- based on exam today will treat with dexamethasone 6mg q8hr today, titrate down to dexamethasone 6mg q12 hr tomorrow  - c/w xopinex prn, symbicort  - ethacrynic acid started to help if edema a component  - wean oxygen as tolerated  - Pulm consult placed    #Acute asthma exacerbation  - treatment as noted above    #Aspiration pneumonitis  - as noted initial suspicion for aspiration, LLL opacification likely atelectasis rather than pneumonia. not clear pneumonitis occurred and improving with steroid  - treatment as noted above    #Sinus Tachycardia  - suspect at this time 2/2 steroid and beta agonist use  - additionally patient endorses worsening of baseline anxiety for which she takes xanax QID prn  - CT chest reporting b/l GGO and basilar atelectasis (on exam better air entry since admission)-eval as noted above  - continue to clinically monitor    #Dm2, not controlled  - diet controlled at home  - consistent carb diet inpatient  - start basal-bolus insulin regimen w/ moderate sliding scale as the patient is not controlled in setting of steroid requirement    #Hypomagnesemia  - resolved  - c/w mgox    #HTN  - diet controlled at home    #Anxiety  - c/w alprazolam prn  iSTOP#: [id number]  06/07/2023	06/07/2023	alprazolam 0.5 mg tablet	120	30    #Prophylactic measure   DVT ppx Lovenox 58F w/ Mild Intermittent Asthma, VANDANA on CPAP, DM2(Diet-controlled), HTN(Diet Controlled), NAFLD p/w Acute Respiratory Failure with Hypoxia 2/2 Asthma Exacerbation 2/2 suspected Aspiration Pneumonitis following outpatient colonoscopy    #Acute Respiratory failure with hypoxia  - reported acute respiratory failure following waking from sedation for outpatient colonoscopy w/ sputum production (fluid/foam/yellow mucous; denies armin hemoptysis; patient reports no acute respiratory disease prior to procedure)  - clinically improving from admission exam- less wheeze and better air entry in LLL field. Patient reported yesterday having tightness that she would experiencing her reactive airway disease and now feels some improvement after steroids  - CT Chest completed w/ GGO in b/l Mid/Upper Lung fields. LLL with some basilar atelectasis on my review. GGO's may represent pulmonary edema 2/2 reactive airway disease or reexpansion after atelectasis (patient reports hx of VANDANA and she reports that propofol was given without intubation of LMA), LLL field appears improved compared to CXR from time of admission so may represent improving aspiration pneumonitis(if occurred)/Resolving LLL atelectasis. ddx for ggo is broad including but not limited to viral disease, atypical bacterial pneumonia for acute disease. It may also be possible that ggo are subacute/chronic and should have f/u CT Chest to determine resolution after acute phase.  - Initial Viral panel (COVID/Flu/RSV) negative, will collect RVP to further assess. Continue with plan for Azithro x3d (11/18->11/20), Levaquin 750 x7d (11/18->11/24). Note initial fever has resolved with antibiotics/steroid which may represent effective treatment of pneumonitis/atypical infections.  - per patient request transition from Methylpred to Dexamethasone- based on exam today will treat with dexamethasone 6mg q8hr today, titrate down to dexamethasone 6mg q12 hr tomorrow  - c/w xopinex prn, symbicort  - ethacrynic acid started to help if edema a component  - wean oxygen as tolerated  - Pulm consult placed    #Acute asthma exacerbation  - treatment as noted above    #Aspiration pneumonitis  - as noted initial suspicion for aspiration, LLL opacification likely atelectasis rather than pneumonia. not clear pneumonitis occurred and improving with steroid  - treatment as noted above    #Sinus Tachycardia  - suspect at this time 2/2 steroid and beta agonist use  - additionally patient endorses worsening of baseline anxiety for which she takes xanax QID prn  - CT chest reporting b/l GGO and basilar atelectasis (on exam better air entry since admission)-eval as noted above  - continue to clinically monitor    #Dm2, not controlled  - diet controlled at home  - consistent carb diet inpatient  - start basal-bolus insulin regimen w/ moderate sliding scale as the patient is not controlled in setting of steroid requirement    #Hypomagnesemia  - resolved  - c/w mgox    #HTN  - diet controlled at home    #Anxiety  - c/w alprazolam prn  iSTOP#: [id number]  06/07/2023	06/07/2023	alprazolam 0.5 mg tablet	120	30    #Prophylactic measure   DVT ppx Lovenox

## 2023-11-19 NOTE — PROGRESS NOTE ADULT - REASON FOR ADMISSION
Acute Respiratory Failure with Hypoxia 2/2 Asthma Exacerbation 2/2 Aspiration Pneumonitis Acute Respiratory Failure with Hypoxia 2/2 Asthma Exacerbation 2/2 Suspected Aspiration Pneumonitis

## 2023-11-20 LAB
A1C WITH ESTIMATED AVERAGE GLUCOSE RESULT: 8.8 % — HIGH (ref 4–5.6)
A1C WITH ESTIMATED AVERAGE GLUCOSE RESULT: 8.8 % — HIGH (ref 4–5.6)
ALBUMIN SERPL ELPH-MCNC: 4.4 G/DL — SIGNIFICANT CHANGE UP (ref 3.5–5.2)
ALBUMIN SERPL ELPH-MCNC: 4.4 G/DL — SIGNIFICANT CHANGE UP (ref 3.5–5.2)
ALP SERPL-CCNC: 111 U/L — SIGNIFICANT CHANGE UP (ref 30–115)
ALP SERPL-CCNC: 111 U/L — SIGNIFICANT CHANGE UP (ref 30–115)
ALT FLD-CCNC: 36 U/L — SIGNIFICANT CHANGE UP (ref 0–41)
ALT FLD-CCNC: 36 U/L — SIGNIFICANT CHANGE UP (ref 0–41)
ANION GAP SERPL CALC-SCNC: 10 MMOL/L — SIGNIFICANT CHANGE UP (ref 7–14)
ANION GAP SERPL CALC-SCNC: 10 MMOL/L — SIGNIFICANT CHANGE UP (ref 7–14)
AST SERPL-CCNC: 16 U/L — SIGNIFICANT CHANGE UP (ref 0–41)
AST SERPL-CCNC: 16 U/L — SIGNIFICANT CHANGE UP (ref 0–41)
BASOPHILS # BLD AUTO: 0.01 K/UL — SIGNIFICANT CHANGE UP (ref 0–0.2)
BASOPHILS # BLD AUTO: 0.01 K/UL — SIGNIFICANT CHANGE UP (ref 0–0.2)
BASOPHILS NFR BLD AUTO: 0.1 % — SIGNIFICANT CHANGE UP (ref 0–1)
BASOPHILS NFR BLD AUTO: 0.1 % — SIGNIFICANT CHANGE UP (ref 0–1)
BILIRUB SERPL-MCNC: 0.4 MG/DL — SIGNIFICANT CHANGE UP (ref 0.2–1.2)
BILIRUB SERPL-MCNC: 0.4 MG/DL — SIGNIFICANT CHANGE UP (ref 0.2–1.2)
BUN SERPL-MCNC: 21 MG/DL — HIGH (ref 10–20)
BUN SERPL-MCNC: 21 MG/DL — HIGH (ref 10–20)
CALCIUM SERPL-MCNC: 9.8 MG/DL — SIGNIFICANT CHANGE UP (ref 8.4–10.5)
CALCIUM SERPL-MCNC: 9.8 MG/DL — SIGNIFICANT CHANGE UP (ref 8.4–10.5)
CHLORIDE SERPL-SCNC: 104 MMOL/L — SIGNIFICANT CHANGE UP (ref 98–110)
CHLORIDE SERPL-SCNC: 104 MMOL/L — SIGNIFICANT CHANGE UP (ref 98–110)
CO2 SERPL-SCNC: 30 MMOL/L — SIGNIFICANT CHANGE UP (ref 17–32)
CO2 SERPL-SCNC: 30 MMOL/L — SIGNIFICANT CHANGE UP (ref 17–32)
CREAT SERPL-MCNC: 0.7 MG/DL — SIGNIFICANT CHANGE UP (ref 0.7–1.5)
CREAT SERPL-MCNC: 0.7 MG/DL — SIGNIFICANT CHANGE UP (ref 0.7–1.5)
D DIMER BLD IA.RAPID-MCNC: 182 NG/ML DDU — SIGNIFICANT CHANGE UP
D DIMER BLD IA.RAPID-MCNC: 182 NG/ML DDU — SIGNIFICANT CHANGE UP
EGFR: 101 ML/MIN/1.73M2 — SIGNIFICANT CHANGE UP
EGFR: 101 ML/MIN/1.73M2 — SIGNIFICANT CHANGE UP
EOSINOPHIL # BLD AUTO: 0 K/UL — SIGNIFICANT CHANGE UP (ref 0–0.7)
EOSINOPHIL # BLD AUTO: 0 K/UL — SIGNIFICANT CHANGE UP (ref 0–0.7)
EOSINOPHIL NFR BLD AUTO: 0 % — SIGNIFICANT CHANGE UP (ref 0–8)
EOSINOPHIL NFR BLD AUTO: 0 % — SIGNIFICANT CHANGE UP (ref 0–8)
ESTIMATED AVERAGE GLUCOSE: 206 MG/DL — HIGH (ref 68–114)
ESTIMATED AVERAGE GLUCOSE: 206 MG/DL — HIGH (ref 68–114)
GLUCOSE SERPL-MCNC: 253 MG/DL — HIGH (ref 70–99)
GLUCOSE SERPL-MCNC: 253 MG/DL — HIGH (ref 70–99)
HCT VFR BLD CALC: 37.9 % — SIGNIFICANT CHANGE UP (ref 37–47)
HCT VFR BLD CALC: 37.9 % — SIGNIFICANT CHANGE UP (ref 37–47)
HGB BLD-MCNC: 13.1 G/DL — SIGNIFICANT CHANGE UP (ref 12–16)
HGB BLD-MCNC: 13.1 G/DL — SIGNIFICANT CHANGE UP (ref 12–16)
IMM GRANULOCYTES NFR BLD AUTO: 0.6 % — HIGH (ref 0.1–0.3)
IMM GRANULOCYTES NFR BLD AUTO: 0.6 % — HIGH (ref 0.1–0.3)
INR BLD: 1.02 RATIO — SIGNIFICANT CHANGE UP (ref 0.65–1.3)
INR BLD: 1.02 RATIO — SIGNIFICANT CHANGE UP (ref 0.65–1.3)
LEGIONELLA AG UR QL: NEGATIVE — SIGNIFICANT CHANGE UP
LEGIONELLA AG UR QL: NEGATIVE — SIGNIFICANT CHANGE UP
LYMPHOCYTES # BLD AUTO: 1.14 K/UL — LOW (ref 1.2–3.4)
LYMPHOCYTES # BLD AUTO: 1.14 K/UL — LOW (ref 1.2–3.4)
LYMPHOCYTES # BLD AUTO: 8.4 % — LOW (ref 20.5–51.1)
LYMPHOCYTES # BLD AUTO: 8.4 % — LOW (ref 20.5–51.1)
MAGNESIUM SERPL-MCNC: 2.2 MG/DL — SIGNIFICANT CHANGE UP (ref 1.8–2.4)
MAGNESIUM SERPL-MCNC: 2.2 MG/DL — SIGNIFICANT CHANGE UP (ref 1.8–2.4)
MCHC RBC-ENTMCNC: 31.6 PG — HIGH (ref 27–31)
MCHC RBC-ENTMCNC: 31.6 PG — HIGH (ref 27–31)
MCHC RBC-ENTMCNC: 34.6 G/DL — SIGNIFICANT CHANGE UP (ref 32–37)
MCHC RBC-ENTMCNC: 34.6 G/DL — SIGNIFICANT CHANGE UP (ref 32–37)
MCV RBC AUTO: 91.3 FL — SIGNIFICANT CHANGE UP (ref 81–99)
MCV RBC AUTO: 91.3 FL — SIGNIFICANT CHANGE UP (ref 81–99)
MELD SCORE WITH DIALYSIS: 20 POINTS — SIGNIFICANT CHANGE UP
MELD SCORE WITH DIALYSIS: 20 POINTS — SIGNIFICANT CHANGE UP
MELD SCORE WITHOUT DIALYSIS: 7 POINTS — SIGNIFICANT CHANGE UP
MELD SCORE WITHOUT DIALYSIS: 7 POINTS — SIGNIFICANT CHANGE UP
MONOCYTES # BLD AUTO: 0.67 K/UL — HIGH (ref 0.1–0.6)
MONOCYTES # BLD AUTO: 0.67 K/UL — HIGH (ref 0.1–0.6)
MONOCYTES NFR BLD AUTO: 5 % — SIGNIFICANT CHANGE UP (ref 1.7–9.3)
MONOCYTES NFR BLD AUTO: 5 % — SIGNIFICANT CHANGE UP (ref 1.7–9.3)
NEUTROPHILS # BLD AUTO: 11.63 K/UL — HIGH (ref 1.4–6.5)
NEUTROPHILS # BLD AUTO: 11.63 K/UL — HIGH (ref 1.4–6.5)
NEUTROPHILS NFR BLD AUTO: 85.9 % — HIGH (ref 42.2–75.2)
NEUTROPHILS NFR BLD AUTO: 85.9 % — HIGH (ref 42.2–75.2)
NRBC # BLD: 0 /100 WBCS — SIGNIFICANT CHANGE UP (ref 0–0)
NRBC # BLD: 0 /100 WBCS — SIGNIFICANT CHANGE UP (ref 0–0)
PHOSPHATE SERPL-MCNC: 3.4 MG/DL — SIGNIFICANT CHANGE UP (ref 2.1–4.9)
PHOSPHATE SERPL-MCNC: 3.4 MG/DL — SIGNIFICANT CHANGE UP (ref 2.1–4.9)
PLATELET # BLD AUTO: 227 K/UL — SIGNIFICANT CHANGE UP (ref 130–400)
PLATELET # BLD AUTO: 227 K/UL — SIGNIFICANT CHANGE UP (ref 130–400)
PMV BLD: 12 FL — HIGH (ref 7.4–10.4)
PMV BLD: 12 FL — HIGH (ref 7.4–10.4)
POTASSIUM SERPL-MCNC: 4.7 MMOL/L — SIGNIFICANT CHANGE UP (ref 3.5–5)
POTASSIUM SERPL-MCNC: 4.7 MMOL/L — SIGNIFICANT CHANGE UP (ref 3.5–5)
POTASSIUM SERPL-SCNC: 4.7 MMOL/L — SIGNIFICANT CHANGE UP (ref 3.5–5)
POTASSIUM SERPL-SCNC: 4.7 MMOL/L — SIGNIFICANT CHANGE UP (ref 3.5–5)
PROT SERPL-MCNC: 6.8 G/DL — SIGNIFICANT CHANGE UP (ref 6–8)
PROT SERPL-MCNC: 6.8 G/DL — SIGNIFICANT CHANGE UP (ref 6–8)
PROTHROM AB SERPL-ACNC: 11.6 SEC — SIGNIFICANT CHANGE UP (ref 9.95–12.87)
PROTHROM AB SERPL-ACNC: 11.6 SEC — SIGNIFICANT CHANGE UP (ref 9.95–12.87)
RBC # BLD: 4.15 M/UL — LOW (ref 4.2–5.4)
RBC # BLD: 4.15 M/UL — LOW (ref 4.2–5.4)
RBC # FLD: 12.7 % — SIGNIFICANT CHANGE UP (ref 11.5–14.5)
RBC # FLD: 12.7 % — SIGNIFICANT CHANGE UP (ref 11.5–14.5)
S PNEUM AG UR QL: NEGATIVE — SIGNIFICANT CHANGE UP
S PNEUM AG UR QL: NEGATIVE — SIGNIFICANT CHANGE UP
SODIUM SERPL-SCNC: 144 MMOL/L — SIGNIFICANT CHANGE UP (ref 135–146)
SODIUM SERPL-SCNC: 144 MMOL/L — SIGNIFICANT CHANGE UP (ref 135–146)
WBC # BLD: 13.53 K/UL — HIGH (ref 4.8–10.8)
WBC # BLD: 13.53 K/UL — HIGH (ref 4.8–10.8)
WBC # FLD AUTO: 13.53 K/UL — HIGH (ref 4.8–10.8)
WBC # FLD AUTO: 13.53 K/UL — HIGH (ref 4.8–10.8)

## 2023-11-20 PROCEDURE — 71045 X-RAY EXAM CHEST 1 VIEW: CPT | Mod: 26

## 2023-11-20 PROCEDURE — 99223 1ST HOSP IP/OBS HIGH 75: CPT

## 2023-11-20 PROCEDURE — 99233 SBSQ HOSP IP/OBS HIGH 50: CPT

## 2023-11-20 RX ORDER — FAMOTIDINE 10 MG/ML
40 INJECTION INTRAVENOUS DAILY
Refills: 0 | Status: DISCONTINUED | OUTPATIENT
Start: 2023-11-20 | End: 2023-11-22

## 2023-11-20 RX ORDER — SODIUM CHLORIDE 0.65 %
1 AEROSOL, SPRAY (ML) NASAL
Refills: 0 | Status: DISCONTINUED | OUTPATIENT
Start: 2023-11-20 | End: 2023-11-22

## 2023-11-20 RX ORDER — SALICYLIC ACID 0.5 %
1 CLEANSER (GRAM) TOPICAL EVERY 6 HOURS
Refills: 0 | Status: DISCONTINUED | OUTPATIENT
Start: 2023-11-20 | End: 2023-11-22

## 2023-11-20 RX ORDER — FLUTICASONE PROPIONATE 50 MCG
1 SPRAY, SUSPENSION NASAL
Refills: 0 | Status: DISCONTINUED | OUTPATIENT
Start: 2023-11-20 | End: 2023-11-20

## 2023-11-20 RX ORDER — AZITHROMYCIN 500 MG/1
500 TABLET, FILM COATED ORAL EVERY 24 HOURS
Refills: 0 | Status: DISCONTINUED | OUTPATIENT
Start: 2023-11-20 | End: 2023-11-21

## 2023-11-20 RX ORDER — INSULIN GLARGINE 100 [IU]/ML
15 INJECTION, SOLUTION SUBCUTANEOUS
Refills: 0 | Status: DISCONTINUED | OUTPATIENT
Start: 2023-11-20 | End: 2023-11-21

## 2023-11-20 RX ORDER — INSULIN GLARGINE 100 [IU]/ML
10 INJECTION, SOLUTION SUBCUTANEOUS
Refills: 0 | Status: DISCONTINUED | OUTPATIENT
Start: 2023-11-20 | End: 2023-11-20

## 2023-11-20 RX ORDER — INSULIN LISPRO 100/ML
10 VIAL (ML) SUBCUTANEOUS ONCE
Refills: 0 | Status: COMPLETED | OUTPATIENT
Start: 2023-11-20 | End: 2023-11-20

## 2023-11-20 RX ADMIN — INSULIN GLARGINE 10 UNIT(S): 100 INJECTION, SOLUTION SUBCUTANEOUS at 08:41

## 2023-11-20 RX ADMIN — BUDESONIDE AND FORMOTEROL FUMARATE DIHYDRATE 2 PUFF(S): 160; 4.5 AEROSOL RESPIRATORY (INHALATION) at 08:41

## 2023-11-20 RX ADMIN — Medication 6 MILLIGRAM(S): at 21:12

## 2023-11-20 RX ADMIN — LEVALBUTEROL 1.25 MILLIGRAM(S): 1.25 SOLUTION, CONCENTRATE RESPIRATORY (INHALATION) at 20:32

## 2023-11-20 RX ADMIN — ENOXAPARIN SODIUM 40 MILLIGRAM(S): 100 INJECTION SUBCUTANEOUS at 17:33

## 2023-11-20 RX ADMIN — AZITHROMYCIN 255 MILLIGRAM(S): 500 TABLET, FILM COATED ORAL at 08:23

## 2023-11-20 RX ADMIN — Medication 10 UNIT(S): at 11:51

## 2023-11-20 RX ADMIN — Medication 6 MILLIGRAM(S): at 08:18

## 2023-11-20 RX ADMIN — ETHACRYNIC ACID 25 MILLIGRAM(S): 25 TABLET ORAL at 08:24

## 2023-11-20 RX ADMIN — LEVALBUTEROL 1.25 MILLIGRAM(S): 1.25 SOLUTION, CONCENTRATE RESPIRATORY (INHALATION) at 16:23

## 2023-11-20 RX ADMIN — Medication 6: at 11:51

## 2023-11-20 RX ADMIN — Medication 10: at 17:26

## 2023-11-20 RX ADMIN — Medication 600 MILLIGRAM(S): at 17:28

## 2023-11-20 RX ADMIN — FAMOTIDINE 40 MILLIGRAM(S): 10 INJECTION INTRAVENOUS at 11:01

## 2023-11-20 RX ADMIN — Medication 1 APPLICATION(S): at 22:29

## 2023-11-20 RX ADMIN — Medication 10 UNIT(S): at 00:24

## 2023-11-20 RX ADMIN — LEVALBUTEROL 1.25 MILLIGRAM(S): 1.25 SOLUTION, CONCENTRATE RESPIRATORY (INHALATION) at 09:22

## 2023-11-20 RX ADMIN — Medication 4: at 08:17

## 2023-11-20 RX ADMIN — Medication 10 UNIT(S): at 08:17

## 2023-11-20 RX ADMIN — Medication 10 UNIT(S): at 17:27

## 2023-11-20 RX ADMIN — BUDESONIDE AND FORMOTEROL FUMARATE DIHYDRATE 2 PUFF(S): 160; 4.5 AEROSOL RESPIRATORY (INHALATION) at 21:10

## 2023-11-20 RX ADMIN — Medication 1 SPRAY(S): at 22:29

## 2023-11-20 RX ADMIN — INSULIN GLARGINE 15 UNIT(S): 100 INJECTION, SOLUTION SUBCUTANEOUS at 21:21

## 2023-11-20 RX ADMIN — MAGNESIUM OXIDE 400 MG ORAL TABLET 400 MILLIGRAM(S): 241.3 TABLET ORAL at 11:22

## 2023-11-20 RX ADMIN — Medication 600 MILLIGRAM(S): at 08:22

## 2023-11-20 RX ADMIN — MAGNESIUM OXIDE 400 MG ORAL TABLET 400 MILLIGRAM(S): 241.3 TABLET ORAL at 17:28

## 2023-11-20 RX ADMIN — LEVALBUTEROL 1.25 MILLIGRAM(S): 1.25 SOLUTION, CONCENTRATE RESPIRATORY (INHALATION) at 13:01

## 2023-11-20 RX ADMIN — MAGNESIUM OXIDE 400 MG ORAL TABLET 400 MILLIGRAM(S): 241.3 TABLET ORAL at 08:18

## 2023-11-20 NOTE — PROGRESS NOTE ADULT - ASSESSMENT
58F w/ Mild Intermittent Asthma, VANDANA on CPAP, DM2(Diet-controlled), HTN(Diet Controlled), NAFLD p/w Acute Respiratory Failure with Hypoxia 2/2 Asthma Exacerbation 2/2 suspected Aspiration Pneumonitis following outpatient colonoscopy    ***Note patient has significant allergy to sulfa meds and sulfite    #Acute Respiratory failure with hypoxia  - CT Chest (11/20)- b/l GGO & b/l basilar consolidation L>R c/w atelectasis; repeat CXR 11/20 improved from admit  - Initial Viral panel (COVID/Flu/RSV) & RVP reported negative   - Continue with plan for Azithro x3d (11/18->11/20), Levaquin 750 x7d (11/18->11/24). Note initial fever has resolved with antibiotics/steroid which may represent effective treatment of pneumonitis/atypical infections.  - plan to cut down on steroid to dexamethasone 6mg q12hr  - c/w xopinex prn, symbicort  - ethacrynic acid started to help if edema a component  - wean oxygen as tolerated  - Pulm consulted & a/w management  - will check d-dimer as patient reports coughing brown sputum and blood w/ persistent tahcycardia    #Acute asthma exacerbation  - treatment as noted above    #Aspiration pneumonitis  - as noted initial suspicion for aspiration, LLL opacification likely atelectasis rather than pneumonia.   - famotidine 40d ordered  - treatment as noted above    #Sinus Tachycardia  - suspect at this time 2/2 steroid and beta agonist use  - additionally patient endorses worsening of baseline anxiety for which she takes xanax QID prn  - CT chest reporting b/l GGO and basilar atelectasis (on exam better air entry since admission)-eval as noted above  - d/w patient will check d-dimer and if elevated will get CTA chest    #Dm2, not controlled  - diet controlled at home  - consistent carb diet inpatient  - plan to continue to titrate basal-bolus insulin regimen w/ moderate sliding scale as the patient is not controlled in setting of steroid requirement    #HTN  - diet controlled at home    #Anxiety  - c/w alprazolam prn  iSTOP#: [id number]  06/07/2023	06/07/2023	alprazolam 0.5 mg tablet	120	30    #Prophylactic measure   DVT ppx Lovenox

## 2023-11-20 NOTE — PROGRESS NOTE ADULT - SUBJECTIVE AND OBJECTIVE BOX
CC: 58F admitted w/ Acute Respiratory Failure with Hypoxia 2/2 Asthma Exacerbation 2/2 Suspected Aspiration Pneumonitis    SUBJECTIVE / OVERNIGHT EVENTS:  No acute events overnight. Patient seen and evaluated at bedside. No fever/chills.  She continues to have cough and wheeze but she reports feeling less tightness. There is now small brown sputum w/ blood streak reported. She reports that she has be getting jittery with steroids and has had some anxiety which she says her home dosing as needed should be fine.    ROS:  no vomiting    MEDICATIONS  (STANDING):  azithromycin  IVPB 500 milliGRAM(s) IV Intermittent every 24 hours  budesonide 160 MICROgram(s)/formoterol 4.5 MICROgram(s) Inhaler 2 Puff(s) Inhalation two times a day  dexAMETHasone  Injectable 6 milliGRAM(s) IV Push every 12 hours  dextrose 5%. 1000 milliLiter(s) (100 mL/Hr) IV Continuous <Continuous>  dextrose 5%. 1000 milliLiter(s) (50 mL/Hr) IV Continuous <Continuous>  dextrose 50% Injectable 25 Gram(s) IV Push once  dextrose 50% Injectable 12.5 Gram(s) IV Push once  dextrose 50% Injectable 25 Gram(s) IV Push once  enoxaparin Injectable 40 milliGRAM(s) SubCutaneous every 24 hours  ethacrynic acid 25 milliGRAM(s) Oral daily  famotidine    Tablet 40 milliGRAM(s) Oral daily  glucagon  Injectable 1 milliGRAM(s) IntraMuscular once  guaiFENesin  milliGRAM(s) Oral every 12 hours  insulin glargine Injectable (LANTUS) 10 Unit(s) SubCutaneous two times a day  insulin lispro (ADMELOG) corrective regimen sliding scale   SubCutaneous three times a day before meals  insulin lispro (ADMELOG) corrective regimen sliding scale   SubCutaneous at bedtime  insulin lispro Injectable (ADMELOG) 10 Unit(s) SubCutaneous three times a day before meals  levalbuterol Inhalation 1.25 milliGRAM(s) Inhalation every 4 hours  levoFLOXacin IVPB 750 milliGRAM(s) IV Intermittent every 24 hours  magnesium oxide 400 milliGRAM(s) Oral three times a day with meals    MEDICATIONS  (PRN):  ALPRAZolam 0.5 milliGRAM(s) Oral four times a day PRN anxiety  benzonatate 100 milliGRAM(s) Oral three times a day PRN Cough  dextrose Oral Gel 15 Gram(s) Oral once PRN Blood Glucose LESS THAN 70 milliGRAM(s)/deciliter  ibuprofen  Tablet. 400 milliGRAM(s) Oral every 8 hours PRN Temp greater or equal to 38C (100.4F), Moderate Pain (4 - 6)  ondansetron Injectable 4 milliGRAM(s) IV Push every 8 hours PRN Nausea and/or Vomiting    CAPILLARY BLOOD GLUCOSE  POCT Blood Glucose.: 248 mg/dL (20 Nov 2023 07:47)  POCT Blood Glucose.: 321 mg/dL (20 Nov 2023 02:57)  POCT Blood Glucose.: 409 mg/dL (20 Nov 2023 01:30)  POCT Blood Glucose.: 405 mg/dL (19 Nov 2023 23:28)  POCT Blood Glucose.: 424 mg/dL (19 Nov 2023 22:23)  POCT Blood Glucose.: 321 mg/dL (19 Nov 2023 21:16)  POCT Blood Glucose.: 296 mg/dL (19 Nov 2023 19:25)  POCT Blood Glucose.: 484 mg/dL (19 Nov 2023 16:21)  POCT Blood Glucose.: 324 mg/dL (19 Nov 2023 12:24)    Physical Exam  Vital Signs Last 24 Hrs  T(C): 36.3 (20 Nov 2023 04:54), Max: 37.3 (19 Nov 2023 14:07)  T(F): 97.4 (20 Nov 2023 04:54), Max: 99.1 (19 Nov 2023 14:07)  HR: 101 (20 Nov 2023 04:54) (101 - 123)  BP: 105/59 (20 Nov 2023 04:54) (105/59 - 122/76)  RR: 18 (20 Nov 2023 04:54) (18 - 18)  SpO2: 97% (20 Nov 2023 04:54) (94% - 97%)    Parameters below as of 20 Nov 2023 04:54  Patient On (Oxygen Delivery Method): nasal cannula 3L    GENERAL: NAD  HEAD:  Atraumatic, Normocephalic  EYES: EOMI, PERRL, conjunctiva and sclera clear  ENMT: MMM, no angular cheilitis appreciated  NECK: Supple, trachea midline  Lung: full sentences with normal breathing pattern, intermittent cough, b/l wheeze present, much better air entry in b/l base  Cardiovascular: S1&S2+, regular tachycardic  approx 100bpm, no m/r/g appreciated  ABDOMEN: soft, nt  : No gallardo catheter, no suprapubic pain to palpation  Neuro: Alert & follows commands, mild b/l hand tremor+, no flaccid paralysis in extremities appreciated  SKIN: warm and dry, no visible purulence in exposed areas    LABS:                        13.1   13.53 )-----------( 227      ( 20 Nov 2023 07:30 )             37.9     11-19    137  |  101  |  21<H>  ----------------------------<  473<HH>  5.1<H>   |  23  |  1.1    Ca    9.4      19 Nov 2023 23:19  Phos  3.6     11-19  Mg     2.1     11-19    TPro  6.6  /  Alb  4.1  /  TBili  0.5  /  DBili  x   /  AST  23  /  ALT  45<H>  /  AlkPhos  104  11-19    PT/INR - ( 20 Nov 2023 07:30 )   PT: 11.60 sec;   INR: 1.02 ratio         PTT - ( 18 Nov 2023 11:33 )  PTT:24.6 sec  CARDIAC MARKERS ( 18 Nov 2023 11:33 )  x     / <0.01 ng/mL / x     / x     / x          Urinalysis Basic - ( 19 Nov 2023 23:19 )    Color: x / Appearance: x / SG: x / pH: x  Gluc: 473 mg/dL / Ketone: x  / Bili: x / Urobili: x   Blood: x / Protein: x / Nitrite: x   Leuk Esterase: x / RBC: x / WBC x   Sq Epi: x / Non Sq Epi: x / Bacteria: x          RADIOLOGY & ADDITIONAL TESTS:  Results Reviewed:   Imaging Personally Reviewed:  Electrocardiogram Personally Reviewed:    COORDINATION OF CARE:  Care Discussed with Consultants/Other Providers [Y/N]:  Prior or Outpatient Records Reviewed [Y/N]:

## 2023-11-20 NOTE — CONSULT NOTE ADULT - SUBJECTIVE AND OBJECTIVE BOX
HPI:  58-year-old female past medical history dm , hypertension, hyperlipidemia, asthma presents for evaluation of shortness of breath.  Patient had a colonoscopy this morning ( propofol )  and after waking up developed shortness of breath and wheezing.   colonoscopy was routine for abd pain and was told , all was ok on scope , pt also had EGD few weeks ago as well -- revealed gastritis   Patient went home and took prednisone without improvement.  Now presents with mild squeezing chest pain, shortness of breath and nonproductive cough.    KNOWN ALLERGY TO SULFER AND PCN   Of note pt is on no home medications and all is diet controlled , only take albuterol prn   Denies fever, headache, abdominal pain, weakness, numbness, dysuria, hematuria.,  Vomiting, diarrhea.    ER: de sat - cxr revealed pna , started iv abx and steroids      (18 Nov 2023 13:01)       I reviewed the radiology tests and hospital record including the ED chart.    I reviewed the other consultants comments that are available in the chart.    CC/ HPI Patient is a 58y old  Female who presents with a chief complaint of Acute Respiratory Failure with Hypoxia 2/2 Asthma Exacerbation 2/2 Suspected Aspiration Pneumonitis (19 Nov 2023 14:07)      Shortness of breath    No pertinent family history in first degree relatives    HTN (hypertension)    HLD (hyperlipidemia)    Fatty liver    Asthma    SOB (shortness of breath)    Acute respiratory failure with hypoxia    Acute asthma exacerbation    Aspiration pneumonitis    Hypomagnesemia    Type 2 diabetes mellitus    HTN (hypertension)    Prophylactic measure    Sinus tachycardia    VANDANA on CPAP    S/P abdominoplasty    S/P bilateral breast reduction    CHESTPAIN COUCH SHORTNESS OF BREATH    Pneumonia        FAMILY HISTORY:  No pertinent family history in first degree relatives        sulfa drugs (Angioedema)  latex (Rash)  Augmentin (Stomach Upset)      Soc:  see Hpi.    Home prescriptions  Xopenex HFA 45 mcg/inh inhalation aerosol: 2 puff(s) inhaled every 6 hours as needed for  shortness of breath and/or wheezing      MEDICATIONS  (STANDING):  azithromycin  IVPB 500 milliGRAM(s) IV Intermittent every 24 hours  budesonide 160 MICROgram(s)/formoterol 4.5 MICROgram(s) Inhaler 2 Puff(s) Inhalation two times a day  dexAMETHasone  Injectable 6 milliGRAM(s) IV Push every 12 hours  dextrose 5%. 1000 milliLiter(s) (50 mL/Hr) IV Continuous <Continuous>  dextrose 5%. 1000 milliLiter(s) (100 mL/Hr) IV Continuous <Continuous>  dextrose 50% Injectable 12.5 Gram(s) IV Push once  dextrose 50% Injectable 25 Gram(s) IV Push once  dextrose 50% Injectable 25 Gram(s) IV Push once  enoxaparin Injectable 40 milliGRAM(s) SubCutaneous every 24 hours  ethacrynic acid 25 milliGRAM(s) Oral daily  glucagon  Injectable 1 milliGRAM(s) IntraMuscular once  guaiFENesin  milliGRAM(s) Oral every 12 hours  insulin glargine Injectable (LANTUS) 15 Unit(s) SubCutaneous at bedtime  insulin lispro (ADMELOG) corrective regimen sliding scale   SubCutaneous three times a day before meals  insulin lispro (ADMELOG) corrective regimen sliding scale   SubCutaneous at bedtime  insulin lispro Injectable (ADMELOG) 10 Unit(s) SubCutaneous three times a day before meals  levalbuterol Inhalation 1.25 milliGRAM(s) Inhalation every 4 hours  levoFLOXacin IVPB 750 milliGRAM(s) IV Intermittent every 24 hours  magnesium oxide 400 milliGRAM(s) Oral three times a day with meals    MEDICATIONS  (PRN):  ALPRAZolam 0.5 milliGRAM(s) Oral four times a day PRN anxiety  benzonatate 100 milliGRAM(s) Oral three times a day PRN Cough  dextrose Oral Gel 15 Gram(s) Oral once PRN Blood Glucose LESS THAN 70 milliGRAM(s)/deciliter  famotidine    Tablet 40 milliGRAM(s) Oral two times a day PRN For Acid Reflux  ibuprofen  Tablet. 400 milliGRAM(s) Oral every 8 hours PRN Temp greater or equal to 38C (100.4F), Moderate Pain (4 - 6)  ondansetron Injectable 4 milliGRAM(s) IV Push every 8 hours PRN Nausea and/or Vomiting      lactated ringers Bolus:   1000 milliLiter(s), IV Bolus, once, infuse over 60 Minute(s), Stop After 1 Doses  Provider's Contact #: [phone]  lactated ringers Bolus:   1000 milliLiter(s), IV Bolus, once, infuse over 60 Minute(s), Stop After 1 Doses  Provider's Contact #: [phone]      T(C): 36.3 (11-20-23 @ 04:54), Max: 37.3 (11-19-23 @ 14:07)  HR: 101 (11-20-23 @ 04:54) (101 - 123)  BP: 105/59 (11-20-23 @ 04:54) (105/59 - 122/76)  RR: 18 (11-20-23 @ 04:54) (18 - 18)  SpO2: 97% (11-20-23 @ 04:54) (94% - 97%)  ICU Vital Signs Last 24 Hrs  T(C): 36.3 (20 Nov 2023 04:54), Max: 37.3 (19 Nov 2023 14:07)  T(F): 97.4 (20 Nov 2023 04:54), Max: 99.1 (19 Nov 2023 14:07)  HR: 101 (20 Nov 2023 04:54) (101 - 123)  BP: 105/59 (20 Nov 2023 04:54) (105/59 - 122/76)  RR: 18 (20 Nov 2023 04:54) (18 - 18)  SpO2: 97% (20 Nov 2023 04:54) (94% - 97%)    O2 Parameters below as of 20 Nov 2023 04:54  Patient On (Oxygen Delivery Method): nasal cannula            I&O's Summary      I&O's Detail      Drug Dosing Weight  Height (cm): 167.6 (18 Nov 2023 14:27)  Weight (kg): 84.5 (18 Nov 2023 14:27)  BMI (kg/m2): 30.1 (18 Nov 2023 14:27)  BSA (m2): 1.94 (18 Nov 2023 14:27)    LABS:                          13.3   14.65 )-----------( 198      ( 19 Nov 2023 04:30 )             38.9       11-19    137  |  101  |  21<H>  ----------------------------<  473<HH>  5.1<H>   |  23  |  1.1    Ca    9.4      19 Nov 2023 23:19  Phos  3.6     11-19  Mg     2.1     11-19    TPro  6.6  /  Alb  4.1  /  TBili  0.5  /  DBili  x   /  AST  23  /  ALT  45<H>  /  AlkPhos  104  11-19      LIVER FUNCTIONS - ( 19 Nov 2023 04:30 )  Alb: 4.1 g/dL / Pro: 6.6 g/dL / ALK PHOS: 104 U/L / ALT: 45 U/L / AST: 23 U/L / GGT: x             CARDIAC MARKERS ( 18 Nov 2023 11:33 )  x     / <0.01 ng/mL / x     / x     / x          SARS-CoV-2: NotDetec (19 Nov 2023 13:30)      Urinalysis Basic - ( 19 Nov 2023 23:19 )    Color: x / Appearance: x / SG: x / pH: x  Gluc: 473 mg/dL / Ketone: x  / Bili: x / Urobili: x   Blood: x / Protein: x / Nitrite: x   Leuk Esterase: x / RBC: x / WBC x   Sq Epi: x / Non Sq Epi: x / Bacteria: x        azithromycin  IVPB 500 milliGRAM(s) IV Intermittent every 24 hours  levoFLOXacin IVPB 750 milliGRAM(s) IV Intermittent every 24 hours        RADIOLOGY:  I have personally reviewed all chest and other pertinent radiology films.         REVIEW OF SYSTEMS:   see Hpi.    PHYSICAL EXAM:       · ENMT:   Airway patent,   Nasal mucosa clear.  Mouth with normal mucosa.   No thrush    · EYES:   Clear bilaterally,   pupils equal,   round and reactive to light.    · CARDIAC:   Normal rate,   regular rhythm.    Heart sounds S1, S2.   no thrills or bruits on palpitation  normal  cardiac impulse  No murmurs, no rubs or gallops on auscultation  no edema        CAROTID:   normal systolic impulse  no bruits    · RESPIRATORY:   rales b/l  scattered wheeze  normal chest expansion  not tachypneic,  no retractions or use of accessory muscles  palpation of chest is normal with no fremitus  percussion of chest demonstrates no hyperresonance or dullness    · GASTROINTESTINAL:  Abdomen soft,   non-tender,   no guarding,   + BS  liver spleen not palpable      · MUSCULOSKELETAL:   range of motion is not limited,  no clubbing, cyanosis  no petechiae      · SKIN:   Skin normal color for race,   warm,   dry and intact.       · HEME LYMPH:   no splenomegaly.  No cervical  lymphadenopathy.  no inguinal lymphadenopathy         HPI:  58-year-old female past medical history dm , hypertension, hyperlipidemia, asthma presents for evaluation of shortness of breath.  Patient had a colonoscopy this morning ( propofol )  and after waking up developed shortness of breath and wheezing.   colonoscopy was routine for abd pain and was told , all was ok on scope , pt also had EGD few weeks ago as well -- revealed gastritis   Patient went home and took prednisone without improvement.  Now presents with mild squeezing chest pain, shortness of breath and nonproductive cough.    Denies fever, headache, abdominal pain, weakness, numbness, dysuria, hematuria.,  Vomiting, diarrhea.    ER: de sat - cxr revealed pna , started iv abx and steroids      (18 Nov 2023 13:01)    Pt states that immediately upon arising from the colonoscopy she was coughing and hacking with significant amounts of "liquid" coming up out of her lungs. She was also wheezing. Apparantly there was not a lot of post op management and the patient was immediately sent home. On waiving home she felt ill and SOB with wheezing. She decided to lie down and within a short period of time she awoke very SOB. She had a pulse ox at home and it was reading in the 70s % range. She immediately came to the ED.       I reviewed the radiology tests and hospital record including the ED chart.    I reviewed the other consultants comments that are available in the chart.    CC/ HPI Patient is a 58y old  Female who presents with a chief complaint of Acute Respiratory Failure with Hypoxia 2/2 Asthma Exacerbation 2/2 Suspected Aspiration Pneumonitis (19 Nov 2023 14:07)      Shortness of breath    No pertinent family history in first degree relatives    HTN (hypertension)    HLD (hyperlipidemia)    Fatty liver    Asthma    SOB (shortness of breath)    Acute respiratory failure with hypoxia    Acute asthma exacerbation    Aspiration pneumonitis    Hypomagnesemia    Type 2 diabetes mellitus    HTN (hypertension)    Prophylactic measure    Sinus tachycardia    VANDANA on CPAP    S/P abdominoplasty    S/P bilateral breast reduction    CHESTPAIN COUCH SHORTNESS OF BREATH    Pneumonia        FAMILY HISTORY:  No pertinent family history in first degree relatives        sulfa drugs (Angioedema)  latex (Rash)  Augmentin (Stomach Upset)      Soc:  see Hpi.    Home prescriptions  Xopenex HFA 45 mcg/inh inhalation aerosol: 2 puff(s) inhaled every 6 hours as needed for  shortness of breath and/or wheezing      MEDICATIONS  (STANDING):  azithromycin  IVPB 500 milliGRAM(s) IV Intermittent every 24 hours  budesonide 160 MICROgram(s)/formoterol 4.5 MICROgram(s) Inhaler 2 Puff(s) Inhalation two times a day  dexAMETHasone  Injectable 6 milliGRAM(s) IV Push every 12 hours  dextrose 5%. 1000 milliLiter(s) (50 mL/Hr) IV Continuous <Continuous>  dextrose 5%. 1000 milliLiter(s) (100 mL/Hr) IV Continuous <Continuous>  dextrose 50% Injectable 12.5 Gram(s) IV Push once  dextrose 50% Injectable 25 Gram(s) IV Push once  dextrose 50% Injectable 25 Gram(s) IV Push once  enoxaparin Injectable 40 milliGRAM(s) SubCutaneous every 24 hours  ethacrynic acid 25 milliGRAM(s) Oral daily  glucagon  Injectable 1 milliGRAM(s) IntraMuscular once  guaiFENesin  milliGRAM(s) Oral every 12 hours  insulin glargine Injectable (LANTUS) 15 Unit(s) SubCutaneous at bedtime  insulin lispro (ADMELOG) corrective regimen sliding scale   SubCutaneous three times a day before meals  insulin lispro (ADMELOG) corrective regimen sliding scale   SubCutaneous at bedtime  insulin lispro Injectable (ADMELOG) 10 Unit(s) SubCutaneous three times a day before meals  levalbuterol Inhalation 1.25 milliGRAM(s) Inhalation every 4 hours  levoFLOXacin IVPB 750 milliGRAM(s) IV Intermittent every 24 hours  magnesium oxide 400 milliGRAM(s) Oral three times a day with meals    MEDICATIONS  (PRN):  ALPRAZolam 0.5 milliGRAM(s) Oral four times a day PRN anxiety  benzonatate 100 milliGRAM(s) Oral three times a day PRN Cough  dextrose Oral Gel 15 Gram(s) Oral once PRN Blood Glucose LESS THAN 70 milliGRAM(s)/deciliter  famotidine    Tablet 40 milliGRAM(s) Oral two times a day PRN For Acid Reflux  ibuprofen  Tablet. 400 milliGRAM(s) Oral every 8 hours PRN Temp greater or equal to 38C (100.4F), Moderate Pain (4 - 6)  ondansetron Injectable 4 milliGRAM(s) IV Push every 8 hours PRN Nausea and/or Vomiting      lactated ringers Bolus:   1000 milliLiter(s), IV Bolus, once, infuse over 60 Minute(s), Stop After 1 Doses  Provider's Contact #: [phone]  lactated ringers Bolus:   1000 milliLiter(s), IV Bolus, once, infuse over 60 Minute(s), Stop After 1 Doses  Provider's Contact #: [phone]      T(C): 36.3 (11-20-23 @ 04:54), Max: 37.3 (11-19-23 @ 14:07)  HR: 101 (11-20-23 @ 04:54) (101 - 123)  BP: 105/59 (11-20-23 @ 04:54) (105/59 - 122/76)  RR: 18 (11-20-23 @ 04:54) (18 - 18)  SpO2: 97% (11-20-23 @ 04:54) (94% - 97%)  ICU Vital Signs Last 24 Hrs  T(C): 36.3 (20 Nov 2023 04:54), Max: 37.3 (19 Nov 2023 14:07)  T(F): 97.4 (20 Nov 2023 04:54), Max: 99.1 (19 Nov 2023 14:07)  HR: 101 (20 Nov 2023 04:54) (101 - 123)  BP: 105/59 (20 Nov 2023 04:54) (105/59 - 122/76)  RR: 18 (20 Nov 2023 04:54) (18 - 18)  SpO2: 97% (20 Nov 2023 04:54) (94% - 97%)    O2 Parameters below as of 20 Nov 2023 04:54  Patient On (Oxygen Delivery Method): nasal cannula            I&O's Summary      I&O's Detail      Drug Dosing Weight  Height (cm): 167.6 (18 Nov 2023 14:27)  Weight (kg): 84.5 (18 Nov 2023 14:27)  BMI (kg/m2): 30.1 (18 Nov 2023 14:27)  BSA (m2): 1.94 (18 Nov 2023 14:27)    LABS:                          13.3   14.65 )-----------( 198      ( 19 Nov 2023 04:30 )             38.9       11-19    137  |  101  |  21<H>  ----------------------------<  473<HH>  5.1<H>   |  23  |  1.1    Ca    9.4      19 Nov 2023 23:19  Phos  3.6     11-19  Mg     2.1     11-19    TPro  6.6  /  Alb  4.1  /  TBili  0.5  /  DBili  x   /  AST  23  /  ALT  45<H>  /  AlkPhos  104  11-19      LIVER FUNCTIONS - ( 19 Nov 2023 04:30 )  Alb: 4.1 g/dL / Pro: 6.6 g/dL / ALK PHOS: 104 U/L / ALT: 45 U/L / AST: 23 U/L / GGT: x             CARDIAC MARKERS ( 18 Nov 2023 11:33 )  x     / <0.01 ng/mL / x     / x     / x          SARS-CoV-2: NotDetec (19 Nov 2023 13:30)      Urinalysis Basic - ( 19 Nov 2023 23:19 )    Color: x / Appearance: x / SG: x / pH: x  Gluc: 473 mg/dL / Ketone: x  / Bili: x / Urobili: x   Blood: x / Protein: x / Nitrite: x   Leuk Esterase: x / RBC: x / WBC x   Sq Epi: x / Non Sq Epi: x / Bacteria: x        azithromycin  IVPB 500 milliGRAM(s) IV Intermittent every 24 hours  levoFLOXacin IVPB 750 milliGRAM(s) IV Intermittent every 24 hours        RADIOLOGY:  I have personally reviewed all chest and other pertinent radiology films.         REVIEW OF SYSTEMS:   see Hpi.    PHYSICAL EXAM:       · ENMT:   Airway patent,   Nasal mucosa clear.  Mouth with normal mucosa.   No thrush    · EYES:   Clear bilaterally,   pupils equal,   round and reactive to light.    · CARDIAC:   Normal rate,   regular rhythm.    Heart sounds S1, S2.   no thrills or bruits on palpitation  normal  cardiac impulse  No murmurs, no rubs or gallops on auscultation  no edema        CAROTID:   normal systolic impulse  no bruits    · RESPIRATORY:   rales b/l  scattered wheeze  normal chest expansion  not tachypneic,  no retractions or use of accessory muscles  palpation of chest is normal with no fremitus  percussion of chest demonstrates no hyperresonance or dullness    · GASTROINTESTINAL:  Abdomen soft,   non-tender,   no guarding,   + BS  liver spleen not palpable      · MUSCULOSKELETAL:   range of motion is not limited,  no clubbing, cyanosis  no petechiae      · SKIN:   Skin normal color for race,   warm,   dry and intact.       · HEME LYMPH:   no splenomegaly.  No cervical  lymphadenopathy.  no inguinal lymphadenopathy

## 2023-11-20 NOTE — CONSULT NOTE ADULT - ASSESSMENT
IMPRESSION:  bronchospasm, atelectasis, pneumonitis/pneumonia and hypoxia due to apparent aspiration of gastric contents during endoscopy  The aspiration was likely triggered by significant VANDANA that was created by sleep inducing anesthesia. Attempting to breath against a collapsed upper airway causes large negative pressure swings in the thorax prompting a suction effect on the stomach thereby triggering the aspiration      SUGGEST:  not ready for D/C  Check O2 sat on NC, record, continue O2 as necessary to maintain sats > 90%<94%  taper steroids  bronchodilator treatments ATC and PRN  complete course of antibiotics  NIPPV as needed  OOB to chair ambulate with O2  GI and DVT prophylaxis  pulmonary toilet  Monitor clinically, convert to oral prednisone as clinical improvement allows  Upon discharge the patient needs an ICS/LABA, a PRN albuterol MDI and an oral pred taper.  the patient should not travel to an area of increased altitude nor fly in the near future due to the hypoxic effects of altitude and 10k foot pressurization of the plane cabin  F/U in office in  2 weeks after D/C

## 2023-11-21 PROBLEM — K76.0 FATTY (CHANGE OF) LIVER, NOT ELSEWHERE CLASSIFIED: Chronic | Status: ACTIVE | Noted: 2023-11-18

## 2023-11-21 PROBLEM — J45.909 UNSPECIFIED ASTHMA, UNCOMPLICATED: Chronic | Status: ACTIVE | Noted: 2023-11-18

## 2023-11-21 LAB
ALBUMIN SERPL ELPH-MCNC: 4.5 G/DL — SIGNIFICANT CHANGE UP (ref 3.5–5.2)
ALBUMIN SERPL ELPH-MCNC: 4.5 G/DL — SIGNIFICANT CHANGE UP (ref 3.5–5.2)
ALP SERPL-CCNC: 106 U/L — SIGNIFICANT CHANGE UP (ref 30–115)
ALP SERPL-CCNC: 106 U/L — SIGNIFICANT CHANGE UP (ref 30–115)
ALT FLD-CCNC: 33 U/L — SIGNIFICANT CHANGE UP (ref 0–41)
ALT FLD-CCNC: 33 U/L — SIGNIFICANT CHANGE UP (ref 0–41)
ANION GAP SERPL CALC-SCNC: 9 MMOL/L — SIGNIFICANT CHANGE UP (ref 7–14)
ANION GAP SERPL CALC-SCNC: 9 MMOL/L — SIGNIFICANT CHANGE UP (ref 7–14)
AST SERPL-CCNC: 16 U/L — SIGNIFICANT CHANGE UP (ref 0–41)
AST SERPL-CCNC: 16 U/L — SIGNIFICANT CHANGE UP (ref 0–41)
BASOPHILS # BLD AUTO: 0 K/UL — SIGNIFICANT CHANGE UP (ref 0–0.2)
BASOPHILS # BLD AUTO: 0 K/UL — SIGNIFICANT CHANGE UP (ref 0–0.2)
BASOPHILS NFR BLD AUTO: 0 % — SIGNIFICANT CHANGE UP (ref 0–1)
BASOPHILS NFR BLD AUTO: 0 % — SIGNIFICANT CHANGE UP (ref 0–1)
BILIRUB SERPL-MCNC: 0.3 MG/DL — SIGNIFICANT CHANGE UP (ref 0.2–1.2)
BILIRUB SERPL-MCNC: 0.3 MG/DL — SIGNIFICANT CHANGE UP (ref 0.2–1.2)
BUN SERPL-MCNC: 22 MG/DL — HIGH (ref 10–20)
BUN SERPL-MCNC: 22 MG/DL — HIGH (ref 10–20)
CALCIUM SERPL-MCNC: 10 MG/DL — SIGNIFICANT CHANGE UP (ref 8.4–10.5)
CALCIUM SERPL-MCNC: 10 MG/DL — SIGNIFICANT CHANGE UP (ref 8.4–10.5)
CHLORIDE SERPL-SCNC: 99 MMOL/L — SIGNIFICANT CHANGE UP (ref 98–110)
CHLORIDE SERPL-SCNC: 99 MMOL/L — SIGNIFICANT CHANGE UP (ref 98–110)
CO2 SERPL-SCNC: 32 MMOL/L — SIGNIFICANT CHANGE UP (ref 17–32)
CO2 SERPL-SCNC: 32 MMOL/L — SIGNIFICANT CHANGE UP (ref 17–32)
CREAT SERPL-MCNC: 0.7 MG/DL — SIGNIFICANT CHANGE UP (ref 0.7–1.5)
CREAT SERPL-MCNC: 0.7 MG/DL — SIGNIFICANT CHANGE UP (ref 0.7–1.5)
EGFR: 101 ML/MIN/1.73M2 — SIGNIFICANT CHANGE UP
EGFR: 101 ML/MIN/1.73M2 — SIGNIFICANT CHANGE UP
EOSINOPHIL # BLD AUTO: 0 K/UL — SIGNIFICANT CHANGE UP (ref 0–0.7)
EOSINOPHIL # BLD AUTO: 0 K/UL — SIGNIFICANT CHANGE UP (ref 0–0.7)
EOSINOPHIL NFR BLD AUTO: 0 % — SIGNIFICANT CHANGE UP (ref 0–8)
EOSINOPHIL NFR BLD AUTO: 0 % — SIGNIFICANT CHANGE UP (ref 0–8)
GLUCOSE SERPL-MCNC: 270 MG/DL — HIGH (ref 70–99)
GLUCOSE SERPL-MCNC: 270 MG/DL — HIGH (ref 70–99)
HCT VFR BLD CALC: 40.3 % — SIGNIFICANT CHANGE UP (ref 37–47)
HCT VFR BLD CALC: 40.3 % — SIGNIFICANT CHANGE UP (ref 37–47)
HGB BLD-MCNC: 13.2 G/DL — SIGNIFICANT CHANGE UP (ref 12–16)
HGB BLD-MCNC: 13.2 G/DL — SIGNIFICANT CHANGE UP (ref 12–16)
IMM GRANULOCYTES NFR BLD AUTO: 0.4 % — HIGH (ref 0.1–0.3)
IMM GRANULOCYTES NFR BLD AUTO: 0.4 % — HIGH (ref 0.1–0.3)
LYMPHOCYTES # BLD AUTO: 1.39 K/UL — SIGNIFICANT CHANGE UP (ref 1.2–3.4)
LYMPHOCYTES # BLD AUTO: 1.39 K/UL — SIGNIFICANT CHANGE UP (ref 1.2–3.4)
LYMPHOCYTES # BLD AUTO: 13.9 % — LOW (ref 20.5–51.1)
LYMPHOCYTES # BLD AUTO: 13.9 % — LOW (ref 20.5–51.1)
MAGNESIUM SERPL-MCNC: 2.2 MG/DL — SIGNIFICANT CHANGE UP (ref 1.8–2.4)
MAGNESIUM SERPL-MCNC: 2.2 MG/DL — SIGNIFICANT CHANGE UP (ref 1.8–2.4)
MCHC RBC-ENTMCNC: 31.1 PG — HIGH (ref 27–31)
MCHC RBC-ENTMCNC: 31.1 PG — HIGH (ref 27–31)
MCHC RBC-ENTMCNC: 32.8 G/DL — SIGNIFICANT CHANGE UP (ref 32–37)
MCHC RBC-ENTMCNC: 32.8 G/DL — SIGNIFICANT CHANGE UP (ref 32–37)
MCV RBC AUTO: 94.8 FL — SIGNIFICANT CHANGE UP (ref 81–99)
MCV RBC AUTO: 94.8 FL — SIGNIFICANT CHANGE UP (ref 81–99)
MONOCYTES # BLD AUTO: 0.41 K/UL — SIGNIFICANT CHANGE UP (ref 0.1–0.6)
MONOCYTES # BLD AUTO: 0.41 K/UL — SIGNIFICANT CHANGE UP (ref 0.1–0.6)
MONOCYTES NFR BLD AUTO: 4.1 % — SIGNIFICANT CHANGE UP (ref 1.7–9.3)
MONOCYTES NFR BLD AUTO: 4.1 % — SIGNIFICANT CHANGE UP (ref 1.7–9.3)
NEUTROPHILS # BLD AUTO: 8.19 K/UL — HIGH (ref 1.4–6.5)
NEUTROPHILS # BLD AUTO: 8.19 K/UL — HIGH (ref 1.4–6.5)
NEUTROPHILS NFR BLD AUTO: 81.6 % — HIGH (ref 42.2–75.2)
NEUTROPHILS NFR BLD AUTO: 81.6 % — HIGH (ref 42.2–75.2)
NIGHT BLUE STAIN TISS: SIGNIFICANT CHANGE UP
NIGHT BLUE STAIN TISS: SIGNIFICANT CHANGE UP
NRBC # BLD: 0 /100 WBCS — SIGNIFICANT CHANGE UP (ref 0–0)
NRBC # BLD: 0 /100 WBCS — SIGNIFICANT CHANGE UP (ref 0–0)
PLATELET # BLD AUTO: 234 K/UL — SIGNIFICANT CHANGE UP (ref 130–400)
PLATELET # BLD AUTO: 234 K/UL — SIGNIFICANT CHANGE UP (ref 130–400)
PMV BLD: 11.9 FL — HIGH (ref 7.4–10.4)
PMV BLD: 11.9 FL — HIGH (ref 7.4–10.4)
POTASSIUM SERPL-MCNC: 4.5 MMOL/L — SIGNIFICANT CHANGE UP (ref 3.5–5)
POTASSIUM SERPL-MCNC: 4.5 MMOL/L — SIGNIFICANT CHANGE UP (ref 3.5–5)
POTASSIUM SERPL-SCNC: 4.5 MMOL/L — SIGNIFICANT CHANGE UP (ref 3.5–5)
POTASSIUM SERPL-SCNC: 4.5 MMOL/L — SIGNIFICANT CHANGE UP (ref 3.5–5)
PROT SERPL-MCNC: 6.3 G/DL — SIGNIFICANT CHANGE UP (ref 6–8)
PROT SERPL-MCNC: 6.3 G/DL — SIGNIFICANT CHANGE UP (ref 6–8)
RBC # BLD: 4.25 M/UL — SIGNIFICANT CHANGE UP (ref 4.2–5.4)
RBC # BLD: 4.25 M/UL — SIGNIFICANT CHANGE UP (ref 4.2–5.4)
RBC # FLD: 12.6 % — SIGNIFICANT CHANGE UP (ref 11.5–14.5)
RBC # FLD: 12.6 % — SIGNIFICANT CHANGE UP (ref 11.5–14.5)
SODIUM SERPL-SCNC: 140 MMOL/L — SIGNIFICANT CHANGE UP (ref 135–146)
SODIUM SERPL-SCNC: 140 MMOL/L — SIGNIFICANT CHANGE UP (ref 135–146)
SPECIMEN SOURCE: SIGNIFICANT CHANGE UP
SPECIMEN SOURCE: SIGNIFICANT CHANGE UP
WBC # BLD: 10.03 K/UL — SIGNIFICANT CHANGE UP (ref 4.8–10.8)
WBC # BLD: 10.03 K/UL — SIGNIFICANT CHANGE UP (ref 4.8–10.8)
WBC # FLD AUTO: 10.03 K/UL — SIGNIFICANT CHANGE UP (ref 4.8–10.8)
WBC # FLD AUTO: 10.03 K/UL — SIGNIFICANT CHANGE UP (ref 4.8–10.8)

## 2023-11-21 PROCEDURE — 99233 SBSQ HOSP IP/OBS HIGH 50: CPT

## 2023-11-21 RX ORDER — INSULIN GLARGINE 100 [IU]/ML
20 INJECTION, SOLUTION SUBCUTANEOUS
Refills: 0 | Status: DISCONTINUED | OUTPATIENT
Start: 2023-11-21 | End: 2023-11-21

## 2023-11-21 RX ORDER — INSULIN LISPRO 100/ML
15 VIAL (ML) SUBCUTANEOUS
Refills: 0 | Status: DISCONTINUED | OUTPATIENT
Start: 2023-11-21 | End: 2023-11-21

## 2023-11-21 RX ORDER — DEXTROSE 50 % IN WATER 50 %
12.5 SYRINGE (ML) INTRAVENOUS ONCE
Refills: 0 | Status: DISCONTINUED | OUTPATIENT
Start: 2023-11-21 | End: 2023-11-22

## 2023-11-21 RX ORDER — INSULIN GLARGINE 100 [IU]/ML
10 INJECTION, SOLUTION SUBCUTANEOUS ONCE
Refills: 0 | Status: COMPLETED | OUTPATIENT
Start: 2023-11-21 | End: 2023-11-21

## 2023-11-21 RX ORDER — DEXAMETHASONE 0.5 MG/5ML
4 ELIXIR ORAL DAILY
Refills: 0 | Status: DISCONTINUED | OUTPATIENT
Start: 2023-11-21 | End: 2023-11-22

## 2023-11-21 RX ORDER — INSULIN GLARGINE 100 [IU]/ML
15 INJECTION, SOLUTION SUBCUTANEOUS AT BEDTIME
Refills: 0 | Status: DISCONTINUED | OUTPATIENT
Start: 2023-11-21 | End: 2023-11-22

## 2023-11-21 RX ORDER — DEXTROSE 50 % IN WATER 50 %
25 SYRINGE (ML) INTRAVENOUS ONCE
Refills: 0 | Status: DISCONTINUED | OUTPATIENT
Start: 2023-11-21 | End: 2023-11-22

## 2023-11-21 RX ORDER — DEXTROSE 50 % IN WATER 50 %
15 SYRINGE (ML) INTRAVENOUS ONCE
Refills: 0 | Status: DISCONTINUED | OUTPATIENT
Start: 2023-11-21 | End: 2023-11-22

## 2023-11-21 RX ORDER — INSULIN LISPRO 100/ML
18 VIAL (ML) SUBCUTANEOUS
Refills: 0 | Status: DISCONTINUED | OUTPATIENT
Start: 2023-11-21 | End: 2023-11-22

## 2023-11-21 RX ORDER — INSULIN LISPRO 100/ML
VIAL (ML) SUBCUTANEOUS
Refills: 0 | Status: DISCONTINUED | OUTPATIENT
Start: 2023-11-21 | End: 2023-11-22

## 2023-11-21 RX ORDER — INSULIN GLARGINE 100 [IU]/ML
15 INJECTION, SOLUTION SUBCUTANEOUS EVERY MORNING
Refills: 0 | Status: DISCONTINUED | OUTPATIENT
Start: 2023-11-22 | End: 2023-11-22

## 2023-11-21 RX ORDER — INSULIN LISPRO 100/ML
18 VIAL (ML) SUBCUTANEOUS
Refills: 0 | Status: DISCONTINUED | OUTPATIENT
Start: 2023-11-21 | End: 2023-11-21

## 2023-11-21 RX ORDER — INSULIN GLARGINE 100 [IU]/ML
15 INJECTION, SOLUTION SUBCUTANEOUS
Refills: 0 | Status: DISCONTINUED | OUTPATIENT
Start: 2023-11-21 | End: 2023-11-21

## 2023-11-21 RX ORDER — INSULIN LISPRO 100/ML
VIAL (ML) SUBCUTANEOUS AT BEDTIME
Refills: 0 | Status: DISCONTINUED | OUTPATIENT
Start: 2023-11-21 | End: 2023-11-22

## 2023-11-21 RX ORDER — GLUCAGON INJECTION, SOLUTION 0.5 MG/.1ML
1 INJECTION, SOLUTION SUBCUTANEOUS ONCE
Refills: 0 | Status: DISCONTINUED | OUTPATIENT
Start: 2023-11-21 | End: 2023-11-22

## 2023-11-21 RX ORDER — SODIUM CHLORIDE 9 MG/ML
1000 INJECTION, SOLUTION INTRAVENOUS
Refills: 0 | Status: DISCONTINUED | OUTPATIENT
Start: 2023-11-21 | End: 2023-11-22

## 2023-11-21 RX ADMIN — AZITHROMYCIN 255 MILLIGRAM(S): 500 TABLET, FILM COATED ORAL at 07:45

## 2023-11-21 RX ADMIN — Medication 15 UNIT(S): at 17:02

## 2023-11-21 RX ADMIN — INSULIN GLARGINE 10 UNIT(S): 100 INJECTION, SOLUTION SUBCUTANEOUS at 22:43

## 2023-11-21 RX ADMIN — LEVALBUTEROL 1.25 MILLIGRAM(S): 1.25 SOLUTION, CONCENTRATE RESPIRATORY (INHALATION) at 08:59

## 2023-11-21 RX ADMIN — Medication 6 MILLIGRAM(S): at 08:16

## 2023-11-21 RX ADMIN — LEVALBUTEROL 1.25 MILLIGRAM(S): 1.25 SOLUTION, CONCENTRATE RESPIRATORY (INHALATION) at 19:17

## 2023-11-21 RX ADMIN — ENOXAPARIN SODIUM 40 MILLIGRAM(S): 100 INJECTION SUBCUTANEOUS at 17:07

## 2023-11-21 RX ADMIN — Medication 600 MILLIGRAM(S): at 17:06

## 2023-11-21 RX ADMIN — MAGNESIUM OXIDE 400 MG ORAL TABLET 400 MILLIGRAM(S): 241.3 TABLET ORAL at 07:39

## 2023-11-21 RX ADMIN — FAMOTIDINE 40 MILLIGRAM(S): 10 INJECTION INTRAVENOUS at 11:27

## 2023-11-21 RX ADMIN — BUDESONIDE AND FORMOTEROL FUMARATE DIHYDRATE 2 PUFF(S): 160; 4.5 AEROSOL RESPIRATORY (INHALATION) at 20:10

## 2023-11-21 RX ADMIN — INSULIN GLARGINE 15 UNIT(S): 100 INJECTION, SOLUTION SUBCUTANEOUS at 08:48

## 2023-11-21 RX ADMIN — MAGNESIUM OXIDE 400 MG ORAL TABLET 400 MILLIGRAM(S): 241.3 TABLET ORAL at 11:30

## 2023-11-21 RX ADMIN — Medication 600 MILLIGRAM(S): at 08:16

## 2023-11-21 RX ADMIN — BUDESONIDE AND FORMOTEROL FUMARATE DIHYDRATE 2 PUFF(S): 160; 4.5 AEROSOL RESPIRATORY (INHALATION) at 07:38

## 2023-11-21 RX ADMIN — LEVALBUTEROL 1.25 MILLIGRAM(S): 1.25 SOLUTION, CONCENTRATE RESPIRATORY (INHALATION) at 13:04

## 2023-11-21 RX ADMIN — Medication 10 UNIT(S): at 11:48

## 2023-11-21 RX ADMIN — Medication 6: at 07:37

## 2023-11-21 RX ADMIN — Medication 4: at 17:01

## 2023-11-21 RX ADMIN — Medication 8: at 11:48

## 2023-11-21 RX ADMIN — MAGNESIUM OXIDE 400 MG ORAL TABLET 400 MILLIGRAM(S): 241.3 TABLET ORAL at 17:06

## 2023-11-21 RX ADMIN — ETHACRYNIC ACID 25 MILLIGRAM(S): 25 TABLET ORAL at 08:17

## 2023-11-21 RX ADMIN — LEVALBUTEROL 1.25 MILLIGRAM(S): 1.25 SOLUTION, CONCENTRATE RESPIRATORY (INHALATION) at 23:57

## 2023-11-21 RX ADMIN — Medication 10 UNIT(S): at 07:37

## 2023-11-21 RX ADMIN — LEVALBUTEROL 1.25 MILLIGRAM(S): 1.25 SOLUTION, CONCENTRATE RESPIRATORY (INHALATION) at 16:45

## 2023-11-21 RX ADMIN — LEVALBUTEROL 1.25 MILLIGRAM(S): 1.25 SOLUTION, CONCENTRATE RESPIRATORY (INHALATION) at 00:09

## 2023-11-21 NOTE — PROGRESS NOTE ADULT - ASSESSMENT
IMPRESSION:  bronchospasm, atelectasis, pneumonitis/pneumonia and hypoxia due to apparent aspiration of gastric contents during endoscopy  The aspiration was likely triggered by significant VANDANA that was created by sleep inducing anesthesia. Attempting to breath against a collapsed upper airway causes large negative pressure swings in the thorax prompting a suction effect on the stomach thereby triggering the aspiration      SUGGEST:  ready for D/C 24-48 hours depending on glucose control  continue O2 as necessary to maintain sats > 90%<94% likely willl not need home O2  taper steroids to 4 mg QD  bronchodilator treatments ATC and PRN  complete course of antibiotics Levofloxacin PO alone is fine  NIPPV at HS for VANDANA  OOB to chair ambulate check PO during ambulation  GI and DVT prophylaxis  pulmonary toilet  Monitor clinically, convert to oral prednisone as clinical improvement allows  Upon discharge the patient needs an ICS/LABA, a PRN albuterol MDI and an oral pred taper.  the patient should not travel to an area of increased altitude nor fly in the near future due to the hypoxic effects of altitude and 10k foot pressurization of the plane cabin  F/U in office next week  will need repeat CT chest /CXR 2-3 months

## 2023-11-21 NOTE — PROGRESS NOTE ADULT - ASSESSMENT
58F w/ Mild Intermittent Asthma, VANDANA on CPAP, DM2(Diet-controlled), HTN(Diet Controlled), NAFLD p/w Acute Respiratory Failure with Hypoxia 2/2 Asthma Exacerbation 2/2 suspected Aspiration Pneumonitis following outpatient colonoscopy    ***Note patient has significant allergy to sulfa meds and sulfate/ite    #Acute Respiratory failure with hypoxia  - continues to improve now on room air however require O2 with ambulation  - per Pulm dexamethasone to 4mg d  - c/w Levaquin 750 x7d (11/18->11/24)  - will need reevaluation tomorrow- if still hypoxic while ambulating then will need Home O2 set up    #Acute asthma exacerbation  - treatment as noted above    #Aspiration pneumonitis  - as noted initial suspicion for aspiration, LLL opacification likely atelectasis rather than pneumonia.   - famotidine 40d ordered  - treatment as noted above    #Sinus Tachycardia  - suspect at this time 2/2 steroid and beta agonist use  - additionally patient endorses worsening of baseline anxiety for which she takes xanax QID prn  - CT chest reporting b/l GGO and basilar atelectasis (on exam better air entry since admission)-eval as noted above  - d-dimer negative  - d/c ethacrynic acid (possible overdiuresis, however patient declines iv fluids at this time, given improvement in respiratory status)    #Dm2, not controlled  - reportedly diet-controlled at home  - consistent carb diet inpatient  - plan to continue to titrate basal-bolus insulin regimen w/ moderate sliding scale as the patient is not controlled in setting of steroid requirement    #HTN  - diet controlled at home    #Anxiety  - c/w alprazolam prn  iSTOP#: [id number]  06/07/2023	06/07/2023	alprazolam 0.5 mg tablet	120	30    #Prophylactic measure   DVT ppx Lovenox    Handoff: Continue to monitor respiratory failure as titrating down on steroid, may need Home O2 set up tomorrow if she does not continue to have significant improvement. Issue is steroid-induced hyperglycemia requiring titration of insulin. Lantus BID and humalog premeal doses increased 11/21, will need to see how she improves tomorrow. Patient reports significant allergy to sulfa/sulfate medications

## 2023-11-21 NOTE — PROGRESS NOTE ADULT - SUBJECTIVE AND OBJECTIVE BOX
CC: Patient is a 58y old  Female who presents with a chief complaint of SOB (21 Nov 2023 10:59)      SUBJECTIVE / OVERNIGHT EVENTS:  No acute events overnight. Patient seen and evaluated at bedside. No fever/chills.  Denies SOB at rest, chest pain, palpitations, abdominal pain, nausea/vomiting    ROS:  MEDICATIONS  (STANDING):  budesonide 160 MICROgram(s)/formoterol 4.5 MICROgram(s) Inhaler 2 Puff(s) Inhalation two times a day  dexAMETHasone     Tablet 4 milliGRAM(s) Oral daily  dextrose 5%. 1000 milliLiter(s) (100 mL/Hr) IV Continuous <Continuous>  dextrose 5%. 1000 milliLiter(s) (50 mL/Hr) IV Continuous <Continuous>  dextrose 50% Injectable 25 Gram(s) IV Push once  dextrose 50% Injectable 12.5 Gram(s) IV Push once  dextrose 50% Injectable 25 Gram(s) IV Push once  enoxaparin Injectable 40 milliGRAM(s) SubCutaneous every 24 hours  ethacrynic acid 25 milliGRAM(s) Oral daily  famotidine    Tablet 40 milliGRAM(s) Oral daily  glucagon  Injectable 1 milliGRAM(s) IntraMuscular once  guaiFENesin  milliGRAM(s) Oral every 12 hours  insulin glargine Injectable (LANTUS) 20 Unit(s) SubCutaneous two times a day  insulin lispro (ADMELOG) corrective regimen sliding scale   SubCutaneous three times a day before meals  insulin lispro (ADMELOG) corrective regimen sliding scale   SubCutaneous at bedtime  insulin lispro Injectable (ADMELOG) 15 Unit(s) SubCutaneous three times a day before meals  levalbuterol Inhalation 1.25 milliGRAM(s) Inhalation every 4 hours  levoFLOXacin  Tablet 750 milliGRAM(s) Oral every 24 hours  magnesium oxide 400 milliGRAM(s) Oral three times a day with meals    MEDICATIONS  (PRN):  ALPRAZolam 0.5 milliGRAM(s) Oral four times a day PRN anxiety  benzonatate 100 milliGRAM(s) Oral three times a day PRN Cough  dextrose Oral Gel 15 Gram(s) Oral once PRN Blood Glucose LESS THAN 70 milliGRAM(s)/deciliter  ibuprofen  Tablet. 400 milliGRAM(s) Oral every 8 hours PRN Temp greater or equal to 38C (100.4F), Moderate Pain (4 - 6)  ondansetron Injectable 4 milliGRAM(s) IV Push every 8 hours PRN Nausea and/or Vomiting  sodium chloride 0.65% Nasal 1 Spray(s) Both Nostrils every 2 hours PRN Nasal Congestion  vitamin A &amp; D Ointment 1 Application(s) Topical every 6 hours PRN rash      CAPILLARY BLOOD GLUCOSE  POCT Blood Glucose.: 313 mg/dL (21 Nov 2023 11:42)  POCT Blood Glucose.: 262 mg/dL (21 Nov 2023 07:27)  POCT Blood Glucose.: 233 mg/dL (20 Nov 2023 21:03)  POCT Blood Glucose.: 362 mg/dL (20 Nov 2023 17:20)    Physical Exam  Vital Signs Last 24 Hrs  T(C): 36.7 (21 Nov 2023 13:59), Max: 37.2 (20 Nov 2023 20:58)  T(F): 98.1 (21 Nov 2023 13:59), Max: 98.9 (20 Nov 2023 20:58)  HR: 104 (21 Nov 2023 13:59) (95 - 104)  BP: 122/76 (21 Nov 2023 13:59) (117/60 - 122/76)  RR: 16 (21 Nov 2023 13:59) (16 - 18)  SpO2: 98% (21 Nov 2023 05:26) (95% - 98%) on RA at rest (patient reports desat when ambulating      GENERAL: NAD  HEAD:  Atraumatic, Normocephalic  EYES: EOMI, PERRL, conjunctiva and sclera clear  ENMT: MMM, no angular cheilitis appreciated  NECK: Supple, trachea midline  Lung: normal work of breathing, b/l crackle present, continues to have improved air entry, no wheeze on my exam  Cardiovascular: S1&S2+, rrr, no m/r/g appreciated  ABDOMEN: soft, nt  : No gallardo catheter, no suprapubic pain to palpation  Neuro: Alert & follows commands, no flaccid paralysis in extremities appreciated  SKIN: warm and dry, no visible purulence in exposed areas    LABS:                        13.2   10.03 )-----------( 234      ( 21 Nov 2023 07:31 )             40.3     11-21    140  |  99  |  22<H>  ----------------------------<  270<H>  4.5   |  32  |  0.7    Ca    10.0      21 Nov 2023 07:31  Phos  3.4     11-20  Mg     2.2     11-21    TPro  6.3  /  Alb  4.5  /  TBili  0.3  /  DBili  x   /  AST  16  /  ALT  33  /  AlkPhos  106  11-21  PT/INR - ( 20 Nov 2023 07:30 )   PT: 11.60 sec;   INR: 1.02 ratio      Urinalysis Basic - ( 21 Nov 2023 07:31 )  Color: x / Appearance: x / SG: x / pH: x  Gluc: 270 mg/dL / Ketone: x  / Bili: x / Urobili: x   Blood: x / Protein: x / Nitrite: x   Leuk Esterase: x / RBC: x / WBC x   Sq Epi: x / Non Sq Epi: x / Bacteria: x        Culture - Acid Fast - Sputum w/Smear (collected 20 Nov 2023 11:00)  Source: .Sputum Sputum        RADIOLOGY & ADDITIONAL TESTS:  Results Reviewed:   Imaging Personally Reviewed:  Electrocardiogram Personally Reviewed:    COORDINATION OF CARE:  Care Discussed with Consultants/Other Providers [Y/N]:  Prior or Outpatient Records Reviewed [Y/N]:

## 2023-11-21 NOTE — PROGRESS NOTE ADULT - SUBJECTIVE AND OBJECTIVE BOX
Patient is a 58y old  Female who presents with a chief complaint of Acute Respiratory Failure with Hypoxia 2/2 Asthma Exacerbation 2/2 Suspected Aspiration Pneumonitis (20 Nov 2023 09:50)        HPI:  58-year-old female past medical history dm , hypertension, hyperlipidemia, asthma presents for evaluation of shortness of breath.  Patient had a colonoscopy this morning ( propofol )  and after waking up developed shortness of breath and wheezing.   colonoscopy was routine for abd pain and was told , all was ok on scope , pt also had EGD few weeks ago as well -- revealed gastritis   Patient went home and took prednisone without improvement.  Now presents with mild squeezing chest pain, shortness of breath and nonproductive cough.    KNOWN ALLERGY TO SULFER AND PCN   Of note pt is on no home medications and all is diet controlled , only take albuterol prn   Denies fever, headache, abdominal pain, weakness, numbness, dysuria, hematuria.,  Vomiting, diarrhea.    ER: de sat - cxr revealed pna , started iv abx and steroids      (18 Nov 2023 13:01)      Pt evaluated on rounds.  I reviewed the radiology tests and hospital record.    I reviewed previous notes on this patient.    Interval Events: No overnight events. Feeling better.          REVIEW OF SYSTEMS:   see HPI      OBJECTIVE:  ICU Vital Signs Last 24 Hrs  T(C): 36.7 (21 Nov 2023 05:26), Max: 37.2 (20 Nov 2023 20:58)  T(F): 98 (21 Nov 2023 05:26), Max: 98.9 (20 Nov 2023 20:58)  HR: 95 (21 Nov 2023 05:26) (95 - 130)  BP: 119/60 (21 Nov 2023 05:26) (117/60 - 119/60)  BP(mean): --  ABP: --  ABP(mean): --  RR: 16 (21 Nov 2023 05:26) (16 - 18)  SpO2: 98% (21 Nov 2023 05:26) (95% - 98%)    O2 Parameters below as of 20 Nov 2023 20:58  Patient On (Oxygen Delivery Method): nasal cannula  O2 Flow (L/min): 3            CAPILLARY BLOOD GLUCOSE      POCT Blood Glucose.: 262 mg/dL (21 Nov 2023 07:27)        PHYSICAL EXAM:       · ENMT:   Airway patent,   Nasal mucosa clear.  Mouth with normal mucosa.   No thrush    · EYES:   Clear bilaterally,   pupils equal,   round and reactive to light.    · CARDIAC:   Normal rate,   regular rhythm.    Heart sounds S1, S2.   No murmurs, no rubs or gallops on auscultation  no edema        CAROTID:   normal systolic impulse  no bruits    · RESPIRATORY:   rales  normal chest expansion  no retractions or use of accessory muscles  percussion of chest demonstrates no hyperresonance or dullness    · GASTROINTESTINAL:  Abdomen soft,   non-tender,   + BS  liver/spleen not palpable    · MUSCULOSKELETAL:   no clubbing, cyanosis      · SKIN:   Skin normal color for race,   warm, dry   No evidence of rash.        · HEME LYMPH:   no splenomegaly.  No cervical  lymphadenopathy.  no inguinal lymphadenopathy    HOSPITAL MEDICATIONS:  MEDICATIONS  (STANDING):  azithromycin  IVPB 500 milliGRAM(s) IV Intermittent every 24 hours  budesonide 160 MICROgram(s)/formoterol 4.5 MICROgram(s) Inhaler 2 Puff(s) Inhalation two times a day  dexAMETHasone     Tablet 4 milliGRAM(s) Oral daily  dextrose 5%. 1000 milliLiter(s) (100 mL/Hr) IV Continuous <Continuous>  dextrose 5%. 1000 milliLiter(s) (50 mL/Hr) IV Continuous <Continuous>  dextrose 50% Injectable 25 Gram(s) IV Push once  dextrose 50% Injectable 12.5 Gram(s) IV Push once  dextrose 50% Injectable 25 Gram(s) IV Push once  enoxaparin Injectable 40 milliGRAM(s) SubCutaneous every 24 hours  ethacrynic acid 25 milliGRAM(s) Oral daily  famotidine    Tablet 40 milliGRAM(s) Oral daily  glucagon  Injectable 1 milliGRAM(s) IntraMuscular once  guaiFENesin  milliGRAM(s) Oral every 12 hours  insulin glargine Injectable (LANTUS) 15 Unit(s) SubCutaneous two times a day  insulin lispro (ADMELOG) corrective regimen sliding scale   SubCutaneous at bedtime  insulin lispro (ADMELOG) corrective regimen sliding scale   SubCutaneous three times a day before meals  insulin lispro Injectable (ADMELOG) 10 Unit(s) SubCutaneous three times a day before meals  levalbuterol Inhalation 1.25 milliGRAM(s) Inhalation every 4 hours  levoFLOXacin  Tablet 750 milliGRAM(s) Oral every 24 hours  magnesium oxide 400 milliGRAM(s) Oral three times a day with meals    MEDICATIONS  (PRN):  ALPRAZolam 0.5 milliGRAM(s) Oral four times a day PRN anxiety  benzonatate 100 milliGRAM(s) Oral three times a day PRN Cough  dextrose Oral Gel 15 Gram(s) Oral once PRN Blood Glucose LESS THAN 70 milliGRAM(s)/deciliter  ibuprofen  Tablet. 400 milliGRAM(s) Oral every 8 hours PRN Temp greater or equal to 38C (100.4F), Moderate Pain (4 - 6)  ondansetron Injectable 4 milliGRAM(s) IV Push every 8 hours PRN Nausea and/or Vomiting  sodium chloride 0.65% Nasal 1 Spray(s) Both Nostrils every 2 hours PRN Nasal Congestion  vitamin A &amp; D Ointment 1 Application(s) Topical every 6 hours PRN rash    lactated ringers Bolus:   1000 milliLiter(s), IV Bolus, once, infuse over 60 Minute(s), Stop After 1 Doses  Provider's Contact #: [phone]  lactated ringers Bolus:   1000 milliLiter(s), IV Bolus, once, infuse over 60 Minute(s), Stop After 1 Doses  Provider's Contact #: [phone]      LABS:                        13.2   10.03 )-----------( 234      ( 21 Nov 2023 07:31 )             40.3     11-21    140  |  99  |  22<H>  ----------------------------<  270<H>  4.5   |  32  |  0.7    Ca    10.0      21 Nov 2023 07:31  Phos  3.4     11-20  Mg     2.2     11-21    TPro  6.3  /  Alb  4.5  /  TBili  0.3  /  DBili  x   /  AST  16  /  ALT  33  /  AlkPhos  106  11-21    PT/INR - ( 20 Nov 2023 07:30 )   PT: 11.60 sec;   INR: 1.02 ratio           Urinalysis Basic - ( 21 Nov 2023 07:31 )    Color: x / Appearance: x / SG: x / pH: x  Gluc: 270 mg/dL / Ketone: x  / Bili: x / Urobili: x   Blood: x / Protein: x / Nitrite: x   Leuk Esterase: x / RBC: x / WBC x   Sq Epi: x / Non Sq Epi: x / Bacteria: x                SARS-CoV-2: NotDetec (19 Nov 2023 13:30)            RADIOLOGY: Today I personally interpreted the latest CXR and other pertinent films.

## 2023-11-22 ENCOUNTER — TRANSCRIPTION ENCOUNTER (OUTPATIENT)
Age: 58
End: 2023-11-22

## 2023-11-22 VITALS
SYSTOLIC BLOOD PRESSURE: 137 MMHG | TEMPERATURE: 99 F | HEART RATE: 101 BPM | RESPIRATION RATE: 17 BRPM | DIASTOLIC BLOOD PRESSURE: 63 MMHG

## 2023-11-22 LAB
ALBUMIN SERPL ELPH-MCNC: 3.8 G/DL — SIGNIFICANT CHANGE UP (ref 3.5–5.2)
ALBUMIN SERPL ELPH-MCNC: 3.8 G/DL — SIGNIFICANT CHANGE UP (ref 3.5–5.2)
ALP SERPL-CCNC: 83 U/L — SIGNIFICANT CHANGE UP (ref 30–115)
ALP SERPL-CCNC: 83 U/L — SIGNIFICANT CHANGE UP (ref 30–115)
ALT FLD-CCNC: 39 U/L — SIGNIFICANT CHANGE UP (ref 0–41)
ALT FLD-CCNC: 39 U/L — SIGNIFICANT CHANGE UP (ref 0–41)
ANION GAP SERPL CALC-SCNC: 10 MMOL/L — SIGNIFICANT CHANGE UP (ref 7–14)
ANION GAP SERPL CALC-SCNC: 10 MMOL/L — SIGNIFICANT CHANGE UP (ref 7–14)
AST SERPL-CCNC: 24 U/L — SIGNIFICANT CHANGE UP (ref 0–41)
AST SERPL-CCNC: 24 U/L — SIGNIFICANT CHANGE UP (ref 0–41)
BASOPHILS # BLD AUTO: 0.01 K/UL — SIGNIFICANT CHANGE UP (ref 0–0.2)
BASOPHILS # BLD AUTO: 0.01 K/UL — SIGNIFICANT CHANGE UP (ref 0–0.2)
BASOPHILS NFR BLD AUTO: 0.1 % — SIGNIFICANT CHANGE UP (ref 0–1)
BASOPHILS NFR BLD AUTO: 0.1 % — SIGNIFICANT CHANGE UP (ref 0–1)
BILIRUB SERPL-MCNC: 0.3 MG/DL — SIGNIFICANT CHANGE UP (ref 0.2–1.2)
BILIRUB SERPL-MCNC: 0.3 MG/DL — SIGNIFICANT CHANGE UP (ref 0.2–1.2)
BUN SERPL-MCNC: 26 MG/DL — HIGH (ref 10–20)
BUN SERPL-MCNC: 26 MG/DL — HIGH (ref 10–20)
CALCIUM SERPL-MCNC: 9.1 MG/DL — SIGNIFICANT CHANGE UP (ref 8.4–10.5)
CALCIUM SERPL-MCNC: 9.1 MG/DL — SIGNIFICANT CHANGE UP (ref 8.4–10.5)
CHLORIDE SERPL-SCNC: 101 MMOL/L — SIGNIFICANT CHANGE UP (ref 98–110)
CHLORIDE SERPL-SCNC: 101 MMOL/L — SIGNIFICANT CHANGE UP (ref 98–110)
CO2 SERPL-SCNC: 30 MMOL/L — SIGNIFICANT CHANGE UP (ref 17–32)
CO2 SERPL-SCNC: 30 MMOL/L — SIGNIFICANT CHANGE UP (ref 17–32)
CREAT SERPL-MCNC: 0.8 MG/DL — SIGNIFICANT CHANGE UP (ref 0.7–1.5)
CREAT SERPL-MCNC: 0.8 MG/DL — SIGNIFICANT CHANGE UP (ref 0.7–1.5)
CULTURE RESULTS: SIGNIFICANT CHANGE UP
CULTURE RESULTS: SIGNIFICANT CHANGE UP
EGFR: 86 ML/MIN/1.73M2 — SIGNIFICANT CHANGE UP
EGFR: 86 ML/MIN/1.73M2 — SIGNIFICANT CHANGE UP
EOSINOPHIL # BLD AUTO: 0.03 K/UL — SIGNIFICANT CHANGE UP (ref 0–0.7)
EOSINOPHIL # BLD AUTO: 0.03 K/UL — SIGNIFICANT CHANGE UP (ref 0–0.7)
EOSINOPHIL NFR BLD AUTO: 0.4 % — SIGNIFICANT CHANGE UP (ref 0–8)
EOSINOPHIL NFR BLD AUTO: 0.4 % — SIGNIFICANT CHANGE UP (ref 0–8)
GLUCOSE BLDC GLUCOMTR-MCNC: 242 MG/DL — HIGH (ref 70–99)
GLUCOSE BLDC GLUCOMTR-MCNC: 242 MG/DL — HIGH (ref 70–99)
GLUCOSE BLDC GLUCOMTR-MCNC: 290 MG/DL — HIGH (ref 70–99)
GLUCOSE BLDC GLUCOMTR-MCNC: 290 MG/DL — HIGH (ref 70–99)
GLUCOSE SERPL-MCNC: 181 MG/DL — HIGH (ref 70–99)
GLUCOSE SERPL-MCNC: 181 MG/DL — HIGH (ref 70–99)
HCT VFR BLD CALC: 36.3 % — LOW (ref 37–47)
HCT VFR BLD CALC: 36.3 % — LOW (ref 37–47)
HGB BLD-MCNC: 12.6 G/DL — SIGNIFICANT CHANGE UP (ref 12–16)
HGB BLD-MCNC: 12.6 G/DL — SIGNIFICANT CHANGE UP (ref 12–16)
IMM GRANULOCYTES NFR BLD AUTO: 0.4 % — HIGH (ref 0.1–0.3)
IMM GRANULOCYTES NFR BLD AUTO: 0.4 % — HIGH (ref 0.1–0.3)
LYMPHOCYTES # BLD AUTO: 2.75 K/UL — SIGNIFICANT CHANGE UP (ref 1.2–3.4)
LYMPHOCYTES # BLD AUTO: 2.75 K/UL — SIGNIFICANT CHANGE UP (ref 1.2–3.4)
LYMPHOCYTES # BLD AUTO: 39.9 % — SIGNIFICANT CHANGE UP (ref 20.5–51.1)
LYMPHOCYTES # BLD AUTO: 39.9 % — SIGNIFICANT CHANGE UP (ref 20.5–51.1)
MAGNESIUM SERPL-MCNC: 2.1 MG/DL — SIGNIFICANT CHANGE UP (ref 1.8–2.4)
MAGNESIUM SERPL-MCNC: 2.1 MG/DL — SIGNIFICANT CHANGE UP (ref 1.8–2.4)
MCHC RBC-ENTMCNC: 31.7 PG — HIGH (ref 27–31)
MCHC RBC-ENTMCNC: 31.7 PG — HIGH (ref 27–31)
MCHC RBC-ENTMCNC: 34.7 G/DL — SIGNIFICANT CHANGE UP (ref 32–37)
MCHC RBC-ENTMCNC: 34.7 G/DL — SIGNIFICANT CHANGE UP (ref 32–37)
MCV RBC AUTO: 91.2 FL — SIGNIFICANT CHANGE UP (ref 81–99)
MCV RBC AUTO: 91.2 FL — SIGNIFICANT CHANGE UP (ref 81–99)
MONOCYTES # BLD AUTO: 0.53 K/UL — SIGNIFICANT CHANGE UP (ref 0.1–0.6)
MONOCYTES # BLD AUTO: 0.53 K/UL — SIGNIFICANT CHANGE UP (ref 0.1–0.6)
MONOCYTES NFR BLD AUTO: 7.7 % — SIGNIFICANT CHANGE UP (ref 1.7–9.3)
MONOCYTES NFR BLD AUTO: 7.7 % — SIGNIFICANT CHANGE UP (ref 1.7–9.3)
NEUTROPHILS # BLD AUTO: 3.55 K/UL — SIGNIFICANT CHANGE UP (ref 1.4–6.5)
NEUTROPHILS # BLD AUTO: 3.55 K/UL — SIGNIFICANT CHANGE UP (ref 1.4–6.5)
NEUTROPHILS NFR BLD AUTO: 51.5 % — SIGNIFICANT CHANGE UP (ref 42.2–75.2)
NEUTROPHILS NFR BLD AUTO: 51.5 % — SIGNIFICANT CHANGE UP (ref 42.2–75.2)
NRBC # BLD: 0 /100 WBCS — SIGNIFICANT CHANGE UP (ref 0–0)
NRBC # BLD: 0 /100 WBCS — SIGNIFICANT CHANGE UP (ref 0–0)
PHOSPHATE SERPL-MCNC: 4.6 MG/DL — SIGNIFICANT CHANGE UP (ref 2.1–4.9)
PHOSPHATE SERPL-MCNC: 4.6 MG/DL — SIGNIFICANT CHANGE UP (ref 2.1–4.9)
PLATELET # BLD AUTO: 195 K/UL — SIGNIFICANT CHANGE UP (ref 130–400)
PLATELET # BLD AUTO: 195 K/UL — SIGNIFICANT CHANGE UP (ref 130–400)
PMV BLD: 11.6 FL — HIGH (ref 7.4–10.4)
PMV BLD: 11.6 FL — HIGH (ref 7.4–10.4)
POTASSIUM SERPL-MCNC: 3.7 MMOL/L — SIGNIFICANT CHANGE UP (ref 3.5–5)
POTASSIUM SERPL-MCNC: 3.7 MMOL/L — SIGNIFICANT CHANGE UP (ref 3.5–5)
POTASSIUM SERPL-SCNC: 3.7 MMOL/L — SIGNIFICANT CHANGE UP (ref 3.5–5)
POTASSIUM SERPL-SCNC: 3.7 MMOL/L — SIGNIFICANT CHANGE UP (ref 3.5–5)
PROT SERPL-MCNC: 5.5 G/DL — LOW (ref 6–8)
PROT SERPL-MCNC: 5.5 G/DL — LOW (ref 6–8)
RBC # BLD: 3.98 M/UL — LOW (ref 4.2–5.4)
RBC # BLD: 3.98 M/UL — LOW (ref 4.2–5.4)
RBC # FLD: 12.3 % — SIGNIFICANT CHANGE UP (ref 11.5–14.5)
RBC # FLD: 12.3 % — SIGNIFICANT CHANGE UP (ref 11.5–14.5)
SODIUM SERPL-SCNC: 141 MMOL/L — SIGNIFICANT CHANGE UP (ref 135–146)
SODIUM SERPL-SCNC: 141 MMOL/L — SIGNIFICANT CHANGE UP (ref 135–146)
SPECIMEN SOURCE: SIGNIFICANT CHANGE UP
SPECIMEN SOURCE: SIGNIFICANT CHANGE UP
WBC # BLD: 6.9 K/UL — SIGNIFICANT CHANGE UP (ref 4.8–10.8)
WBC # BLD: 6.9 K/UL — SIGNIFICANT CHANGE UP (ref 4.8–10.8)
WBC # FLD AUTO: 6.9 K/UL — SIGNIFICANT CHANGE UP (ref 4.8–10.8)
WBC # FLD AUTO: 6.9 K/UL — SIGNIFICANT CHANGE UP (ref 4.8–10.8)

## 2023-11-22 PROCEDURE — 99239 HOSP IP/OBS DSCHRG MGMT >30: CPT

## 2023-11-22 PROCEDURE — 99232 SBSQ HOSP IP/OBS MODERATE 35: CPT

## 2023-11-22 RX ORDER — LEVALBUTEROL 1.25 MG/.5ML
2 SOLUTION, CONCENTRATE RESPIRATORY (INHALATION)
Refills: 0 | DISCHARGE

## 2023-11-22 RX ORDER — LEVOFLOXACIN 5 MG/ML
1 INJECTION, SOLUTION INTRAVENOUS
Qty: 0 | Refills: 0 | DISCHARGE
Start: 2023-11-22

## 2023-11-22 RX ORDER — DEXAMETHASONE 0.5 MG/5ML
1 ELIXIR ORAL
Qty: 3 | Refills: 0
Start: 2023-11-22 | End: 2023-11-24

## 2023-11-22 RX ORDER — BUDESONIDE AND FORMOTEROL FUMARATE DIHYDRATE 160; 4.5 UG/1; UG/1
2 AEROSOL RESPIRATORY (INHALATION)
Qty: 1 | Refills: 0
Start: 2023-11-22 | End: 2023-12-21

## 2023-11-22 RX ORDER — ISOPROPYL ALCOHOL, BENZOCAINE .7; .06 ML/ML; ML/ML
0 SWAB TOPICAL
Qty: 100 | Refills: 1
Start: 2023-11-22

## 2023-11-22 RX ORDER — INSULIN GLARGINE 100 [IU]/ML
10 INJECTION, SOLUTION SUBCUTANEOUS
Qty: 6 | Refills: 0
Start: 2023-11-22 | End: 2023-12-21

## 2023-11-22 RX ORDER — FAMOTIDINE 10 MG/ML
1 INJECTION INTRAVENOUS
Qty: 30 | Refills: 0
Start: 2023-11-22 | End: 2023-12-21

## 2023-11-22 RX ORDER — INSULIN LISPRO 100/ML
1 VIAL (ML) SUBCUTANEOUS
Qty: 3 | Refills: 0
Start: 2023-11-22 | End: 2023-12-21

## 2023-11-22 RX ORDER — LEVALBUTEROL 1.25 MG/.5ML
2 SOLUTION, CONCENTRATE RESPIRATORY (INHALATION)
Qty: 1 | Refills: 0
Start: 2023-11-22 | End: 2023-12-21

## 2023-11-22 RX ORDER — LEVOFLOXACIN 5 MG/ML
1 INJECTION, SOLUTION INTRAVENOUS
Qty: 2 | Refills: 0
Start: 2023-11-22 | End: 2023-11-23

## 2023-11-22 RX ORDER — GLUCAGON INJECTION, SOLUTION 0.5 MG/.1ML
1 INJECTION, SOLUTION SUBCUTANEOUS
Qty: 1 | Refills: 0
Start: 2023-11-22

## 2023-11-22 RX ADMIN — Medication 4 MILLIGRAM(S): at 09:06

## 2023-11-22 RX ADMIN — Medication 600 MILLIGRAM(S): at 05:13

## 2023-11-22 RX ADMIN — BUDESONIDE AND FORMOTEROL FUMARATE DIHYDRATE 2 PUFF(S): 160; 4.5 AEROSOL RESPIRATORY (INHALATION) at 09:06

## 2023-11-22 RX ADMIN — Medication 3: at 16:53

## 2023-11-22 RX ADMIN — LEVALBUTEROL 1.25 MILLIGRAM(S): 1.25 SOLUTION, CONCENTRATE RESPIRATORY (INHALATION) at 16:11

## 2023-11-22 RX ADMIN — MAGNESIUM OXIDE 400 MG ORAL TABLET 400 MILLIGRAM(S): 241.3 TABLET ORAL at 11:49

## 2023-11-22 RX ADMIN — Medication 18 UNIT(S): at 07:54

## 2023-11-22 RX ADMIN — FAMOTIDINE 40 MILLIGRAM(S): 10 INJECTION INTRAVENOUS at 11:49

## 2023-11-22 RX ADMIN — INSULIN GLARGINE 15 UNIT(S): 100 INJECTION, SOLUTION SUBCUTANEOUS at 09:03

## 2023-11-22 RX ADMIN — MAGNESIUM OXIDE 400 MG ORAL TABLET 400 MILLIGRAM(S): 241.3 TABLET ORAL at 09:04

## 2023-11-22 RX ADMIN — Medication 1: at 07:53

## 2023-11-22 RX ADMIN — LEVALBUTEROL 1.25 MILLIGRAM(S): 1.25 SOLUTION, CONCENTRATE RESPIRATORY (INHALATION) at 12:06

## 2023-11-22 RX ADMIN — Medication 2: at 11:54

## 2023-11-22 RX ADMIN — Medication 600 MILLIGRAM(S): at 16:57

## 2023-11-22 RX ADMIN — MAGNESIUM OXIDE 400 MG ORAL TABLET 400 MILLIGRAM(S): 241.3 TABLET ORAL at 16:53

## 2023-11-22 RX ADMIN — ENOXAPARIN SODIUM 40 MILLIGRAM(S): 100 INJECTION SUBCUTANEOUS at 16:52

## 2023-11-22 RX ADMIN — LEVALBUTEROL 1.25 MILLIGRAM(S): 1.25 SOLUTION, CONCENTRATE RESPIRATORY (INHALATION) at 07:51

## 2023-11-22 NOTE — DISCHARGE NOTE PROVIDER - CARE PROVIDER_API CALL
Sly Ramsay  Pulmonary Disease  [address] [place] [address]  Phone: [phone]  Fax: [phone]  Follow Up Time:     Ann Del Valle  Internal Medicine  [address] [place] [address]  Phone: [phone]  Fax: [phone]  Follow Up Time:    Sly Ramsay  Pulmonary Disease  [address] [place] [address]  Phone: [phone]  Fax: [phone]  Follow Up Time:     Ann Del Valle  Internal Medicine  [address] [place] [address]  Phone: [phone]  Fax: [phone]  Follow Up Time:    Sly Ramsay  Pulmonary Disease  [address] [place] [address]  Phone: [phone]  Fax: [phone]  Follow Up Time:     Ann Del Valle  Internal Medicine  [address] [place] [address]  Phone: [phone]  Fax: [phone]  Follow Up Time:

## 2023-11-22 NOTE — CHART NOTE - NSCHARTNOTEFT_GEN_A_CORE
Despite IV antibiotics, steroids and bronchodilators patient desaturates.    - Room air pulse ox. at rest: 95%    - Room air pulse ox while ambulatin%    - Pulse ox while ambulating on 2 liters n/c  95%  Patient will require home O2 for discharge.  Patient is aware and agreeable to home O2.  Patient is in a chronic stable state of Asthma/COPD.   Patient was treated for aspiration pneumonitis and continued to desaturate
pt with hyperglycemia, received 8 u lispro   1 hour later pt remains > 400   additional 10 u lispro ordered   stat BMP drawn - no anion gap   will continue to monitor   also changed ABx solution to NS (from dextrose)

## 2023-11-22 NOTE — DISCHARGE NOTE PROVIDER - NSDCMRMEDTOKEN_GEN_ALL_CORE_FT
Admelog SoloStar 100 units/mL injectable solution: 1 application injectable 3 times a day Inject 1-5 units based on blood glucose. 1 unit for glucose 150-200. 2 units for glucose 201-250. 3 units for glucose 251-300. 4 units for 301-350. 5 units for 351-400.  alcohol swabs: Apply topically to affected area 4 times a day  Basaglar KwikPen 100 units/mL subcutaneous solution: 10 unit(s) subcutaneous every 12 hours  benzonatate 100 mg oral capsule: 1 cap(s) orally 3 times a day as needed for Cough  dexAMETHasone 4 mg oral tablet: 1 tab(s) orally once a day START ON 11/23  Freestyle Precision Bassam Strips: Test blood sugar four times a day when you see check blood glucose symbol or when symptoms don&#x27;t match Cricket reading.  glucometer (per patient&#x27;s insurance): Test blood sugars four times a day. Dispense #1 glucometer.  guaiFENesin 600 mg oral tablet, extended release: 1 tab(s) orally every 12 hours  Insulin Pen Needles, 4mm: 1 application subcutaneously 4 times a day. ** Use with insulin pen **  lancets: 1 application subcutaneously 4 times a day  levoFLOXacin 750 mg oral tablet: 1 tab(s) orally every 24 hours  Pepcid 20 mg oral tablet: 1 tab(s) orally once a day  Symbicort 160 mcg-4.5 mcg/inh inhalation aerosol: 2 puff(s) inhaled 2 times a day  Xopenex HFA 45 mcg/inh inhalation aerosol: 2 puff(s) inhaled every 6 hours as needed for  shortness of breath and/or wheezing

## 2023-11-22 NOTE — PROGRESS NOTE ADULT - SUBJECTIVE AND OBJECTIVE BOX
Patient is a 58y old  Female who presents with a chief complaint of Acute Respiratory Failure with Hypoxia 2/2 Asthma Exacerbation 2/2 Suspected Aspiration Pneumonitis (21 Nov 2023 15:26)        HPI:  58-year-old female past medical history dm , hypertension, hyperlipidemia, asthma presents for evaluation of shortness of breath.  Patient had a colonoscopy this morning ( propofol )  and after waking up developed shortness of breath and wheezing.   colonoscopy was routine for abd pain and was told , all was ok on scope , pt also had EGD few weeks ago as well -- revealed gastritis   Patient went home and took prednisone without improvement.  Now presents with mild squeezing chest pain, shortness of breath and nonproductive cough.    KNOWN ALLERGY TO SULFER AND PCN   Of note pt is on no home medications and all is diet controlled , only take albuterol prn   Denies fever, headache, abdominal pain, weakness, numbness, dysuria, hematuria.,  Vomiting, diarrhea.    ER: de sat - cxr revealed pna , started iv abx and steroids      (18 Nov 2023 13:01)      Pt evaluated on rounds.  I reviewed the radiology tests and hospital record.    I reviewed previous notes on this patient.    Interval Events: No overnight events.      CAM:    SAT:    SBT:      REVIEW OF SYSTEMS:   see HPI      OBJECTIVE:  ICU Vital Signs Last 24 Hrs  T(C): 36.6 (22 Nov 2023 04:57), Max: 36.7 (21 Nov 2023 13:59)  T(F): 97.9 (22 Nov 2023 04:57), Max: 98.1 (21 Nov 2023 13:59)  HR: 90 (22 Nov 2023 04:57) (90 - 104)  BP: 135/65 (22 Nov 2023 04:57) (113/68 - 135/65)  BP(mean): --  ABP: --  ABP(mean): --  RR: 16 (22 Nov 2023 04:57) (16 - 16)  SpO2: 99% (22 Nov 2023 04:57) (94% - 99%)    O2 Parameters below as of 22 Nov 2023 04:57  Patient On (Oxygen Delivery Method): BiPAP/CPAP              CAPILLARY BLOOD GLUCOSE      POCT Blood Glucose.: 157 mg/dL (22 Nov 2023 07:48)        PHYSICAL EXAM:       · ENMT:   Airway patent,   Nasal mucosa clear.  Mouth with normal mucosa.   No thrush    · EYES:   Clear bilaterally,   pupils equal,   round and reactive to light.    · CARDIAC:   Normal rate,   regular rhythm.    Heart sounds S1, S2.   No murmurs, no rubs or gallops on auscultation  no edema        CAROTID:   normal systolic impulse  no bruits    · RESPIRATORY:   rales  normal chest expansion  no retractions or use of accessory muscles  percussion of chest demonstrates no hyperresonance or dullness    · GASTROINTESTINAL:  Abdomen soft,   non-tender,   + BS  liver/spleen not palpable    · MUSCULOSKELETAL:   no clubbing, cyanosis      · SKIN:   Skin normal color for race,   warm, dry   No evidence of rash.        · HEME LYMPH:   no splenomegaly.  No cervical  lymphadenopathy.  no inguinal lymphadenopathy    HOSPITAL MEDICATIONS:  MEDICATIONS  (STANDING):  budesonide 160 MICROgram(s)/formoterol 4.5 MICROgram(s) Inhaler 2 Puff(s) Inhalation two times a day  dexAMETHasone     Tablet 4 milliGRAM(s) Oral daily  dextrose 5%. 1000 milliLiter(s) (100 mL/Hr) IV Continuous <Continuous>  dextrose 5%. 1000 milliLiter(s) (50 mL/Hr) IV Continuous <Continuous>  dextrose 50% Injectable 12.5 Gram(s) IV Push once  dextrose 50% Injectable 25 Gram(s) IV Push once  dextrose 50% Injectable 25 Gram(s) IV Push once  enoxaparin Injectable 40 milliGRAM(s) SubCutaneous every 24 hours  famotidine    Tablet 40 milliGRAM(s) Oral daily  glucagon  Injectable 1 milliGRAM(s) IntraMuscular once  guaiFENesin  milliGRAM(s) Oral every 12 hours  insulin glargine Injectable (LANTUS) 15 Unit(s) SubCutaneous every morning  insulin glargine Injectable (LANTUS) 15 Unit(s) SubCutaneous at bedtime  insulin lispro (ADMELOG) corrective regimen sliding scale   SubCutaneous three times a day before meals  insulin lispro (ADMELOG) corrective regimen sliding scale   SubCutaneous at bedtime  insulin lispro Injectable (ADMELOG) 18 Unit(s) SubCutaneous three times a day before meals  levalbuterol Inhalation 1.25 milliGRAM(s) Inhalation every 4 hours  levoFLOXacin  Tablet 750 milliGRAM(s) Oral every 24 hours  magnesium oxide 400 milliGRAM(s) Oral three times a day with meals    MEDICATIONS  (PRN):  ALPRAZolam 0.5 milliGRAM(s) Oral four times a day PRN anxiety  benzonatate 100 milliGRAM(s) Oral three times a day PRN Cough  dextrose Oral Gel 15 Gram(s) Oral once PRN Blood Glucose LESS THAN 70 milliGRAM(s)/deciliter  ibuprofen  Tablet. 400 milliGRAM(s) Oral every 8 hours PRN Temp greater or equal to 38C (100.4F), Moderate Pain (4 - 6)  ondansetron Injectable 4 milliGRAM(s) IV Push every 8 hours PRN Nausea and/or Vomiting  sodium chloride 0.65% Nasal 1 Spray(s) Both Nostrils every 2 hours PRN Nasal Congestion  vitamin A &amp; D Ointment 1 Application(s) Topical every 6 hours PRN rash    lactated ringers Bolus:   1000 milliLiter(s), IV Bolus, once, infuse over 60 Minute(s), Stop After 1 Doses  Provider's Contact #: [phone]  lactated ringers Bolus:   1000 milliLiter(s), IV Bolus, once, infuse over 60 Minute(s), Stop After 1 Doses  Provider's Contact #: [phone]      LABS:                        13.2   10.03 )-----------( 234      ( 21 Nov 2023 07:31 )             40.3     11-21    140  |  99  |  22<H>  ----------------------------<  270<H>  4.5   |  32  |  0.7    Ca    10.0      21 Nov 2023 07:31  Mg     2.2     11-21    TPro  6.3  /  Alb  4.5  /  TBili  0.3  /  DBili  x   /  AST  16  /  ALT  33  /  AlkPhos  106  11-21      Urinalysis Basic - ( 21 Nov 2023 07:31 )    Color: x / Appearance: x / SG: x / pH: x  Gluc: 270 mg/dL / Ketone: x  / Bili: x / Urobili: x   Blood: x / Protein: x / Nitrite: x   Leuk Esterase: x / RBC: x / WBC x   Sq Epi: x / Non Sq Epi: x / Bacteria: x                SARS-CoV-2: NotDetec (19 Nov 2023 13:30)            RADIOLOGY: Today I personally interpreted the latest CXR and other pertinent films.

## 2023-11-22 NOTE — DISCHARGE NOTE PROVIDER - HOSPITAL COURSE
58F w/ Mild Intermittent Asthma, VANDANA on CPAP, DM2(Diet-controlled), HTN(Diet Controlled), NAFLD p/w Acute Respiratory Failure with Hypoxia 2/2 Asthma Exacerbation 2/2 suspected Aspiration Pneumonitis following outpatient colonoscopy    ***Note patient has significant allergy to sulfa meds and sulfate/ite    #Acute Respiratory failure with hypoxia  - continues to improve now on room air however require O2 with ambulation  - taper steroid - per pulm 2 mg x3 days then stop (spoke to pulm on day of dc 11/22 - no need for prednisone after)  - c/w Levaquin 750 x7d (11/18->11/24)  - home O2 to be arranged by CM     #Acute asthma exacerbation  - treatment as noted above    #Aspiration pneumonitis  - as noted initial suspicion for aspiration, LLL opacification likely atelectasis rather than pneumonia.   - famotidine 40d ordered  - treatment as noted above    #Sinus Tachycardia  - suspect at this time 2/2 steroid and beta agonist use  - additionally patient endorses worsening of baseline anxiety for which she takes xanax QID prn  - CT chest reporting b/l GGO and basilar atelectasis (on exam better air entry since admission)-eval as noted above  - d-dimer negative  - d/c ethacrynic acid (possible overdiuresis, however patient declines iv fluids at this time, given improvement in respiratory status)    #Dm2, not controlled  - reportedly diet-controlled at home  - consistent carb diet inpatient  - plan to continue to titrate basal-bolus insulin regimen w/ moderate sliding scale as the patient is not controlled in setting of steroid requirement    #HTN  - diet controlled at home    #Anxiety  - c/w alprazolam prn  iSTOP#: [id number]  06/07/2023	06/07/2023	alprazolam 0.5 mg tablet	120	30    attending attestation:  patient seen and examined on day of discharge  labs and vitals reviewed  patient has no complaints  plan of care discussed with patient/family in agreement and understanding 58F w/ Mild Intermittent Asthma, VANDANA on CPAP, DM2(Diet-controlled), HTN(Diet Controlled), NAFLD p/w Acute Respiratory Failure with Hypoxia 2/2 Asthma Exacerbation 2/2 suspected Aspiration Pneumonitis following outpatient colonoscopy    ***Note patient has significant allergy to sulfa meds and sulfate/ite    #Acute Respiratory failure with hypoxia  - continues to improve now on room air however require O2 with ambulation  - taper steroid - per pulm 2 mg x3 days then stop (spoke to pulm on day of dc 11/22 - no need for prednisone after)  - c/w Levaquin 750 x7d (11/18->11/24)  - home O2 to be arranged by CM     #Acute asthma exacerbation  - treatment as noted above    #Aspiration pneumonitis  - as noted initial suspicion for aspiration, LLL opacification likely atelectasis rather than pneumonia.   - famotidine 40d ordered  - treatment as noted above    #Sinus Tachycardia  - suspect at this time 2/2 steroid and beta agonist use  - additionally patient endorses worsening of baseline anxiety for which she takes xanax QID prn  - CT chest reporting b/l GGO and basilar atelectasis (on exam better air entry since admission)-eval as noted above  - d-dimer negative  - d/c ethacrynic acid (possible overdiuresis, however patient declines iv fluids at this time, given improvement in respiratory status)    #Dm2, not controlled  - reportedly diet-controlled at home  - consistent carb diet inpatient  - plan to continue to titrate basal-bolus insulin regimen w/ moderate sliding scale as the patient is not controlled in setting of steroid requirement    #HTN  - diet controlled at home    #Anxiety  - c/w alprazolam prn  iSTOP#: [id number]  06/07/2023	06/07/2023	alprazolam 0.5 mg tablet	120	30    attending attestation:  patient seen and examined on day of discharge  labs and vitals reviewed  patient has no complaints  plan of care discussed with patient/family in agreement and understanding 58F w/ Mild Intermittent Asthma, VANDANA on CPAP, DM2(Diet-controlled), HTN(Diet Controlled), NAFLD p/w Acute Respiratory Failure with Hypoxia 2/2 Asthma Exacerbation 2/2 suspected Aspiration Pneumonitis following outpatient colonoscopy    ***Note patient has significant allergy to sulfa meds and sulfate/ite    #Acute Respiratory failure with hypoxia  - continues to improve now on room air however require O2 with ambulation  - taper steroid - per pulm 2 mg x3 days then stop (spoke to pulm on day of dc 11/22 - no need for prednisone after)  - c/w Levaquin 750 x7d (11/18->11/24)  - home O2 to be arranged by CM     #Acute asthma exacerbation  - treatment as noted above    #Aspiration pneumonitis  - as noted initial suspicion for aspiration, LLL opacification likely atelectasis rather than pneumonia.   - famotidine 40d ordered  - treatment as noted above    #Sinus Tachycardia  - suspect at this time 2/2 steroid and beta agonist use  - additionally patient endorses worsening of baseline anxiety for which she takes xanax QID prn  - CT chest reporting b/l GGO and basilar atelectasis (on exam better air entry since admission)-eval as noted above  - d-dimer negative  - d/c ethacrynic acid (possible overdiuresis, however patient declines iv fluids at this time, given improvement in respiratory status)    #Dm2, not controlled  - reportedly diet-controlled at home  - consistent carb diet inpatient  - plan to continue to titrate basal-bolus insulin regimen w/ moderate sliding scale as the patient is not controlled in setting of steroid requirement    #HTN  - diet controlled at home    #Anxiety  - c/w alprazolam prn  iSTOP#: [id number]  06/07/2023	06/07/2023	alprazolam 0.5 mg tablet	120	30    attending attestation:  patient seen and examined on day of discharge  labs and vitals reviewed  patient has no complaints  plan of care discussed with patient/family in agreement and understanding

## 2023-11-22 NOTE — DISCHARGE NOTE NURSING/CASE MANAGEMENT/SOCIAL WORK - NSDCPEFALRISK_GEN_ALL_CORE
For information on Fall & Injury Prevention, visit: [email or web address] OR  [email or web address] OR  https://www.cdc.gov/steadi/patient.html

## 2023-11-22 NOTE — DISCHARGE NOTE NURSING/CASE MANAGEMENT/SOCIAL WORK - PATIENT PORTAL LINK FT
You can access the FollowMyHealth Patient Portal offered by [facility] by registering at the following website: [email or web address]. By joining [facility]’s FollowMyHealth portal, you will also be able to view your health information using other applications (apps) compatible with our system.

## 2023-11-22 NOTE — DISCHARGE NOTE PROVIDER - NSDCCPCAREPLAN_GEN_ALL_CORE_FT
PRINCIPAL DISCHARGE DIAGNOSIS  Diagnosis: SOB (shortness of breath)  Assessment and Plan of Treatment: you have acute asthma exaccerbation  complete steroids as prescribed, take famotidine while on steroids  continue with xopenex as needed ,continue with symbicort started in the hospital . you were evaluated by pulmonary and recommended to complete levaquin antibiotic until 11/24  - follow up with Dr. Ramsay in the office next week 12/4/23      SECONDARY DISCHARGE DIAGNOSES  Diagnosis: Pneumonia  Assessment and Plan of Treatment: complete levaquin until 11/24     PRINCIPAL DISCHARGE DIAGNOSIS  Diagnosis: Aspiration pneumonitis  Assessment and Plan of Treatment: you have acute asthma exaccerbation worsened by aspiration   complete steroids as prescribed, take famotidine while on steroids  continue with xopenex as needed ,continue with symbicort started in the hospital . you were evaluated by pulmonary and recommended to complete levaquin antibiotic until 11/24  - follow up with Dr. Ramsay in the office next week 12/4/23      SECONDARY DISCHARGE DIAGNOSES  Diagnosis: Pneumonia  Assessment and Plan of Treatment: complete levaquin until 11/24

## 2023-11-22 NOTE — DISCHARGE NOTE PROVIDER - ATTENDING ATTESTATION STATEMENT
[facility]      Rooming information:  Approximate last use of full opioid agonist: 2021  Taking buprenorphine? Yes:   How much per day? 1 FILM  Number of buprenorphine films/tablets remaining currently: 0  Side effects related to buprenorphine (constipation, dry mouth, sedation?) No   Narcan currently available: Yes  Other recent substance use:    Denies  NICOTINE-Yes:   If using nicotine, ready to quit? Yes: TRYING    Point of care urine drug screen positive for:  Lab Results   Component Value Date    BUP Screen Positive (A) 09/11/2023    BZO Negative 09/11/2023    BAR Negative 09/11/2023    RAMON Negative 09/11/2023    MAMP Negative 09/11/2023    AMP Negative 09/11/2023    MDMA Negative 09/11/2023    MTD Negative 09/11/2023    NZY644 Negative 09/11/2023    OXY Negative 09/11/2023    PCP Negative 09/11/2023    THC Negative 09/11/2023    TEMP 94 F 09/11/2023    SGPOCT 1.015 09/11/2023       *POC urine drug screen does not screen for Fentanyl          9/11/2023     2:00 PM   PHQ Assesment Total Score(s)   PHQ-9 Score 0       If PHQ-9 score of 15 or higher, has [facility] therapist or provider been notified? No    Any current suicidal ideation? No  If yes, has [facility] therapist or provider been notified? N/A    Primary care provider: Physician No Ref-Primary     Mental health provider: DENIES (follow up on MH referral if needed)    Insurance needs: ACTIVE    Housing needs: STABLE    Contact information up to date? YES    3rd Party Involvement NOT TODAY (please obtain ELBA if pt would like to include)    Jyoti Mcdonough MA  September 11, 2023  2:44 PM    I have personally seen and examined the patient. I have collaborated with and supervised the

## 2023-11-22 NOTE — DISCHARGE NOTE PROVIDER - PROVIDER TOKENS
PROVIDER:[TOKEN:[[id number]]],PROVIDER:[TOKEN:[[id number]]] PROVIDER:[TOKEN:[[id number]]],PROVIDER:[TOKEN:[[id number]]] PROVIDER:[TOKEN:[[id number]]],PROVIDER:[TOKEN:[[id number]]]

## 2023-11-22 NOTE — DISCHARGE NOTE PROVIDER - NSDCFUSCHEDAPPT_GEN_ALL_CORE_FT
Sly Ramsay  [facility] Physician [facility]  PULMMED [address]  Scheduled Appointment: 12/04/2023    Loy Truong  [facility]  GASTRO Doc Off [id number]  Scheduled Appointment: 12/11/2023    [facility]  GASTRO Doc Off [address]  Scheduled Appointment: 12/13/2023     Sly Ramsay  [facility] Physician [facility]  PULMMED [address]  Scheduled Appointment: 12/04/2023    Loy Truong  [facility]  GASTRO Doc Off [address]  Scheduled Appointment: 12/11/2023    [facility]  GASTRO Doc Off [address]  Scheduled Appointment: 12/13/2023     Sly Ramsay  [facility] Physician [facility]  PULMMED [address]  Scheduled Appointment: 12/04/2023    Loy Truong  [facility]  GASTRO Doc Off [address]  Scheduled Appointment: 12/11/2023    [facility]  GASTRO Doc Off [address]  Scheduled Appointment: 12/13/2023

## 2023-11-22 NOTE — PROGRESS NOTE ADULT - ASSESSMENT
IMPRESSION:  bronchospasm, atelectasis, pneumonitis/pneumonia and hypoxia due to apparent aspiration of gastric contents during endoscopy  The aspiration was likely triggered by significant VANDANA that was created by sleep inducing anesthesia. Attempting to breath against a collapsed upper airway causes large negative pressure swings in the thorax prompting a suction effect on the stomach thereby triggering the aspiration      SUGGEST:  ready for D/C today from pulm standpoint depending on glucose control  ambulate off O2 to determine need for home O2; 88% or less  taper steroids to 2 mg QD; cont another 3 days then D/C  bronchodilator treatments PRN  complete course of antibiotics Levofloxacin PO alone is fine  NIPPV at HS for VANDANA  OOB to chair ambulate check PO during ambulation  GI and DVT prophylaxis  pulmonary toilet  Upon discharge the patient needs an ICS/LABA, a PRN albuterol MDI and an oral pred taper.  the patient should not travel to an area of increased altitude nor fly in the near future due to the hypoxic effects of altitude and 10k foot pressurization limit of the plane cabin  F/U in my office next week  will need repeat CT chest /CXR 2-3 months

## 2023-12-04 ENCOUNTER — APPOINTMENT (OUTPATIENT)
Dept: PULMONOLOGY | Facility: CLINIC | Age: 58
End: 2023-12-04
Payer: COMMERCIAL

## 2023-12-04 ENCOUNTER — APPOINTMENT (OUTPATIENT)
Dept: GASTROENTEROLOGY | Facility: CLINIC | Age: 58
End: 2023-12-04
Payer: COMMERCIAL

## 2023-12-04 DIAGNOSIS — K86.2 CYST OF PANCREAS: ICD-10-CM

## 2023-12-04 PROCEDURE — 99215 OFFICE O/P EST HI 40 MIN: CPT

## 2023-12-04 PROCEDURE — 99213 OFFICE O/P EST LOW 20 MIN: CPT

## 2023-12-05 PROBLEM — K86.2 PANCREATIC CYST: Status: ACTIVE | Noted: 2023-12-05

## 2023-12-06 DIAGNOSIS — K76.0 FATTY (CHANGE OF) LIVER, NOT ELSEWHERE CLASSIFIED: ICD-10-CM

## 2023-12-06 DIAGNOSIS — G47.33 OBSTRUCTIVE SLEEP APNEA (ADULT) (PEDIATRIC): ICD-10-CM

## 2023-12-06 DIAGNOSIS — J95.89 OTHER POSTPROCEDURAL COMPLICATIONS AND DISORDERS OF RESPIRATORY SYSTEM, NOT ELSEWHERE CLASSIFIED: ICD-10-CM

## 2023-12-06 DIAGNOSIS — J69.0 PNEUMONITIS DUE TO INHALATION OF FOOD AND VOMIT: ICD-10-CM

## 2023-12-06 DIAGNOSIS — Z91.040 LATEX ALLERGY STATUS: ICD-10-CM

## 2023-12-06 DIAGNOSIS — T38.0X5A ADVERSE EFFECT OF GLUCOCORTICOIDS AND SYNTHETIC ANALOGUES, INITIAL ENCOUNTER: ICD-10-CM

## 2023-12-06 DIAGNOSIS — E11.65 TYPE 2 DIABETES MELLITUS WITH HYPERGLYCEMIA: ICD-10-CM

## 2023-12-06 DIAGNOSIS — I10 ESSENTIAL (PRIMARY) HYPERTENSION: ICD-10-CM

## 2023-12-06 DIAGNOSIS — J45.21 MILD INTERMITTENT ASTHMA WITH (ACUTE) EXACERBATION: ICD-10-CM

## 2023-12-06 DIAGNOSIS — E83.42 HYPOMAGNESEMIA: ICD-10-CM

## 2023-12-06 DIAGNOSIS — J96.01 ACUTE RESPIRATORY FAILURE WITH HYPOXIA: ICD-10-CM

## 2023-12-06 DIAGNOSIS — F41.9 ANXIETY DISORDER, UNSPECIFIED: ICD-10-CM

## 2023-12-06 DIAGNOSIS — R00.0 TACHYCARDIA, UNSPECIFIED: ICD-10-CM

## 2023-12-06 DIAGNOSIS — Z88.2 ALLERGY STATUS TO SULFONAMIDES: ICD-10-CM
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
## 2023-12-06 DIAGNOSIS — E78.5 HYPERLIPIDEMIA, UNSPECIFIED: ICD-10-CM

## 2023-12-06 DIAGNOSIS — Z88.1 ALLERGY STATUS TO OTHER ANTIBIOTIC AGENTS STATUS: ICD-10-CM

## 2023-12-12 ENCOUNTER — NON-APPOINTMENT (OUTPATIENT)
Age: 58
End: 2023-12-12

## 2023-12-14 ENCOUNTER — NON-APPOINTMENT (OUTPATIENT)
Age: 58
End: 2023-12-14

## 2023-12-18 ENCOUNTER — APPOINTMENT (OUTPATIENT)
Dept: PULMONOLOGY | Facility: CLINIC | Age: 58
End: 2023-12-18
Payer: COMMERCIAL

## 2023-12-18 VITALS
DIASTOLIC BLOOD PRESSURE: 78 MMHG | OXYGEN SATURATION: 98 % | SYSTOLIC BLOOD PRESSURE: 124 MMHG | HEART RATE: 104 BPM | HEIGHT: 66 IN | WEIGHT: 180 LBS | BODY MASS INDEX: 28.93 KG/M2

## 2023-12-18 PROCEDURE — 99213 OFFICE O/P EST LOW 20 MIN: CPT

## 2023-12-18 NOTE — HISTORY OF PRESENT ILLNESS
[TextBox_4] : I reviewed the original evaluation and last notes.
no pain, swelling or deformity of joints

## 2023-12-18 NOTE — PHYSICAL EXAM
[No Acute Distress] : no acute distress [Normal Oropharynx] : normal oropharynx [Normal Appearance] : normal appearance [No Neck Mass] : no neck mass [Normal Rate/Rhythm] : normal rate/rhythm [Normal S1, S2] : normal s1, s2 [No Murmurs] : no murmurs [No Resp Distress] : no resp distress [No Abnormalities] : no abnormalities [Benign] : benign [Normal Gait] : normal gait [No Clubbing] : no clubbing [No Cyanosis] : no cyanosis [No Edema] : no edema [FROM] : FROM [Normal Color/ Pigmentation] : normal color/ pigmentation [No Focal Deficits] : no focal deficits [Oriented x3] : oriented x3 [Normal Affect] : normal affect [TextBox_68] : Rales rhonchi bilaterally delayed expiratory phase with harsh breath sounds.

## 2023-12-18 NOTE — ASSESSMENT
[FreeTextEntry1] : Asthma (493.90) (J45.909) VANDANA (obstructive sleep apnea) (327.23) (G47.33) Shortness of breath (786.05) (R06.02) Aspiration pneumonia of left lower lobe, unspecified aspiration pneumonia type (507.0) (J69.0) Hypoxia (799.02) (R09.02) The patient has a history of sleep apnea.   We will continue current ICS/LABA and as needed albuterol. She is off steroids. She is off her oxygen. She is currently disabled and cannot go back to work for least another 2 weeks. I will see her back at that time. It is unclear exactly how long it is going to take her to recuperate to a point where she can go back to work. Her work is very labor intensive and she spends a lot of time on her feet with walking in and out of buildings.

## 2023-12-18 NOTE — REASON FOR VISIT
[Follow-Up] : a follow-up visit [Asthma] : asthma [Shortness of Breath] : shortness of breath [TextBox_44] :  Feeling better in general.  She is off the oxygen.  She still however short of breath.  This is on mild to moderate exertion.  She has a very difficult job where she is on her feet all day.  We do not think that she was able to perform as of yet full-time workload.

## 2024-01-02 ENCOUNTER — RESULT REVIEW (OUTPATIENT)
Age: 59
End: 2024-01-02

## 2024-01-02 ENCOUNTER — OUTPATIENT (OUTPATIENT)
Dept: OUTPATIENT SERVICES | Facility: HOSPITAL | Age: 59
LOS: 1 days | End: 2024-01-02
Payer: COMMERCIAL

## 2024-01-02 DIAGNOSIS — Z00.8 ENCOUNTER FOR OTHER GENERAL EXAMINATION: ICD-10-CM

## 2024-01-02 DIAGNOSIS — K76.0 FATTY (CHANGE OF) LIVER, NOT ELSEWHERE CLASSIFIED: ICD-10-CM

## 2024-01-02 DIAGNOSIS — Z98.890 OTHER SPECIFIED POSTPROCEDURAL STATES: Chronic | ICD-10-CM

## 2024-01-02 PROCEDURE — 75571 CT HRT W/O DYE W/CA TEST: CPT | Mod: 26

## 2024-01-02 PROCEDURE — 75571 CT HRT W/O DYE W/CA TEST: CPT

## 2024-01-03 ENCOUNTER — APPOINTMENT (OUTPATIENT)
Dept: PULMONOLOGY | Facility: CLINIC | Age: 59
End: 2024-01-03
Payer: COMMERCIAL

## 2024-01-03 VITALS
SYSTOLIC BLOOD PRESSURE: 138 MMHG | OXYGEN SATURATION: 98 % | DIASTOLIC BLOOD PRESSURE: 84 MMHG | BODY MASS INDEX: 28.93 KG/M2 | HEART RATE: 79 BPM | HEIGHT: 66 IN | WEIGHT: 180 LBS

## 2024-01-03 DIAGNOSIS — K76.0 FATTY (CHANGE OF) LIVER, NOT ELSEWHERE CLASSIFIED: ICD-10-CM

## 2024-01-03 PROCEDURE — 99213 OFFICE O/P EST LOW 20 MIN: CPT

## 2024-01-03 NOTE — ASSESSMENT
[FreeTextEntry1] : Asthma (493.90) (J45.909) Shortness of breath (786.05) (R06.02) VANDANA (obstructive sleep apnea) (327.23) (G47.33) Asthma (493.90) (J45.909) VANDANA (obstructive sleep apnea) (327.23) (G47.33) Shortness of breath (786.05) (R06.02) Aspiration pneumonia of left lower lobe, unspecified aspiration pneumonia type (507.0) (J69.0) Hypoxia (799.02) (R09.02) The patient has a history of sleep apnea.  We will continue current ICS/LABA and as needed albuterol. She is off steroids. She is off her oxygen.  She is currently disabled and cannot go back to work for least another 2 weeks. I will see her back at that time. Hopefully she will be able to go back on light duty at that time and work from the office. It is unclear exactly how long it is going to take her to recuperate to a point where she can go back to her regular job description as her work is very labor intensive.  She spends a lot of time on her feet with extensive walking.

## 2024-01-03 NOTE — REASON FOR VISIT
[Asthma] : asthma [Sleep Apnea] : sleep apnea [Obesity] : obesity [TextBox_44] : doing well ing general

## 2024-01-04 ENCOUNTER — APPOINTMENT (OUTPATIENT)
Dept: GASTROENTEROLOGY | Facility: CLINIC | Age: 59
End: 2024-01-04
Payer: COMMERCIAL

## 2024-01-04 VITALS
HEIGHT: 66 IN | SYSTOLIC BLOOD PRESSURE: 123 MMHG | DIASTOLIC BLOOD PRESSURE: 95 MMHG | WEIGHT: 185 LBS | HEART RATE: 91 BPM | BODY MASS INDEX: 29.73 KG/M2 | OXYGEN SATURATION: 97 %

## 2024-01-04 DIAGNOSIS — Z14.8 GENETIC CARRIER OF OTHER DISEASE: ICD-10-CM

## 2024-01-04 DIAGNOSIS — K76.0 FATTY (CHANGE OF) LIVER, NOT ELSEWHERE CLASSIFIED: ICD-10-CM

## 2024-01-04 PROCEDURE — 99215 OFFICE O/P EST HI 40 MIN: CPT

## 2024-01-05 PROBLEM — K76.0 HEPATIC STEATOSIS: Status: ACTIVE | Noted: 2023-10-26

## 2024-01-05 PROBLEM — Z14.8 HEMOCHROMATOSIS CARRIER: Status: ACTIVE | Noted: 2024-01-05

## 2024-01-05 NOTE — REVIEW OF SYSTEMS
[Hoarseness] : hoarseness [Cough] : cough [SOB on Exertion] : shortness of breath during exertion [Fever] : no fever [Chills] : no chills [Eye Pain] : no eye pain [Red Eyes] : eyes not red [Earache] : no earache [Loss Of Hearing] : no hearing loss [Chest Pain] : no chest pain [Palpitations] : no palpitations [As Noted in HPI] : as noted in HPI [Joint Swelling] : no joint swelling [Skin Lesions] : no skin lesions [Itching] : no itching [Easy Bleeding] : no tendency for easy bleeding [Easy Bruising] : no tendency for easy bruising

## 2024-01-05 NOTE — HISTORY OF PRESENT ILLNESS
[___ Month(s) Ago] : [unfilled] month(s) ago [___] : Performed [unfilled] [de-identified] : 10/26/23  [de-identified] : 58 year old  F with T2DM HTN HLD VANDANA seen for elevated liver tests and hepatic steatosis on imaging.  Reports admission for FUO with ALT in 200's in 2014 with no definitive diagnosis but states was EBV positive. Liver tests improved but has mild ALT elevation since then. Patient had fatty liver on US in 2019 but reports that imaging prior to 2014 showed fatty liver. Had MRI abdomen which showed fatty liver and 9 mm IPMN Has been having upper abdominal pain for 1 years. Pain occurs 1 hour after eating. Has not noticed any specific foods. No other increasing factors. No reducing factors. Not noticed relation to bowel movement or position. Reports pain LUQ as well. Has bloating as well. Symptoms have improved with diet changes. Takes low gluten diet due to family hx of gluten intolerance. Has lactose intolerance as well. Had EGD done today showing hiatal hernia grade A esophagitis with Dr Maury HOOK. Constipated at times. Stool thin but soft. No diarrhea. Had colonoscopy 10 years ago. Has brain fog for some time. No jaundice hematemesis or melena. Reports diabetes controlled with wt loss and feels berberine helped. Lost 20 lb in 2 months intentionally. HBa1c has improved from 8 to 6. Takes Livko - milk thistle schisandra and colleen. Could not tolerate Ozempic due tow worsening GI symptoms. Was on it from May to July 2023. Has DENTON intermittent and told had scarring on lungs. ET varies at times. Sometimes cannot even walk a flight of stairs and sometimes can walk a mile. 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 Recent ESR CRP normal. Has phototoxic skin rash. Had flexural rash in elbows as a kid. Has been having sweats for past 4 months. Had hysterectomy years ago with retention of ovaries.  Reports having had colonoscopy done on Nov 18 th aspirated post procedure. Was admitted and on dexamethasone for few days. Still has hoarse voice. Gained 10 lb wt.  Blood sugar high now needing insulin. Has intermittent L sided abdominal pain colicky.  No RUQ abdominal pain jaundice confusion hematemesis or melena.   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 Lives with partner. No kids Social alcohol use. Smoker till 26 years of age. About 3/4 pack a day for 5 years. No drug use.  No liver cancer in family. LIver cirrhosis in maternal grandmother who  at 57. Father had MM. Reports hemochromatosis in maternal GM. Brother has Congenital adrenal hyperplasia. Colon cancer in aunt.

## 2024-01-05 NOTE — ASSESSMENT
[FreeTextEntry1] : 58 y o  F with elevated BMI T2DM HTN HLD MTFHR HH carrier chronic upper abdominal pain  with MASLD  MASLD Had elevated liver tests but normal in November.  HCV ab negative HBsAg negative.  TTG IgA negative ELMA AMA ASMA SLA LKM negative. Ig levels normal iron sat normal H63D heterozygous A1AT MM, DAVID mild reduction not significant  Has MetS with hx of  T2DM HTN HLD. Hx of obesity but lost 20 lb and wt down to 175 lb. BMI  28 HBa1c down to 6  now.  US / MRI abdomen hepatic steatosis Fibroscan showed  S2 steatosis F0-F1 fibrosis LS 6.8  Had llost 10 lb wt with health eating and exercise but with illness and steroids gained 10 lb.  Advised to avoid supplements wt loss with healthy eating and exercise as feasible.  Was not able to tolerate Ozempic Ca CT report pending.  Follow up in 6 months with repeat Fibroscan.  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   Upper abdominal pain Has bloating with upper abdominal pain.  TTG IgA normal. Had EGD US and MRI abdomen.   IPMN Side branch small IPMN. Seen advanced GI   Health maintenance Had colonoscopy Nov 2023.   HCV ab HBsAg negative Has immunity to hepatitis B. Not immune to hepatitis A and will advise vaccination.

## 2024-01-05 NOTE — PHYSICAL EXAM
[General Appearance - Alert] : alert [General Appearance - Well Nourished] : well nourished [General Appearance - Well Developed] : well developed [Sclera] : the sclera and conjunctiva were normal [Outer Ear] : the ears and nose were normal in appearance [Hearing Threshold Finger Rub Not [place]] : hearing was normal [Neck Cervical Mass (___cm)] : no neck mass was observed [Jugular Venous Distention Increased] : there was no jugular-venous distention [Respiration, Rhythm And Depth] : normal respiratory rhythm and effort [Auscultation Breath Sounds / Voice Sounds] : lungs were clear to auscultation bilaterally [Heart Sounds] : normal S1 and S2 [Murmurs] : no murmurs [Edema] : there was no peripheral edema [Abdomen Soft] : soft [Abdomen Tenderness] : non-tender [Abdomen Mass (___ Cm)] : no abdominal mass palpated [FreeTextEntry1] : Liver just palpable smooth [Cervical Lymph Nodes Enlarged Posterior Bilaterally] : posterior cervical [Cervical Lymph Nodes Enlarged Anterior Bilaterally] : anterior cervical [Supraclavicular Lymph Nodes Enlarged Bilaterally] : supraclavicular [Nail Clubbing] : no clubbing  or cyanosis of the fingernails [] : no rash [No Focal Deficits] : no focal deficits [Oriented To Time, Place, And Person] : oriented to person, place, and time

## 2024-01-06 LAB
CULTURE RESULTS: SIGNIFICANT CHANGE UP
CULTURE RESULTS: SIGNIFICANT CHANGE UP
SPECIMEN SOURCE: SIGNIFICANT CHANGE UP
SPECIMEN SOURCE: SIGNIFICANT CHANGE UP

## 2024-01-23 ENCOUNTER — APPOINTMENT (OUTPATIENT)
Dept: PULMONOLOGY | Facility: CLINIC | Age: 59
End: 2024-01-23
Payer: COMMERCIAL

## 2024-01-23 VITALS
SYSTOLIC BLOOD PRESSURE: 122 MMHG | HEART RATE: 87 BPM | BODY MASS INDEX: 29.73 KG/M2 | OXYGEN SATURATION: 97 % | HEIGHT: 66 IN | WEIGHT: 185 LBS | RESPIRATION RATE: 14 BRPM | DIASTOLIC BLOOD PRESSURE: 76 MMHG

## 2024-01-23 PROCEDURE — 99213 OFFICE O/P EST LOW 20 MIN: CPT

## 2024-01-23 NOTE — PHYSICAL EXAM
[No Acute Distress] : no acute distress [Normal Oropharynx] : normal oropharynx [Normal Appearance] : normal appearance [No Neck Mass] : no neck mass [Normal Rate/Rhythm] : normal rate/rhythm [Normal S1, S2] : normal s1, s2 [No Murmurs] : no murmurs [No Resp Distress] : no resp distress [No Abnormalities] : no abnormalities [Benign] : benign [Normal Gait] : normal gait [No Clubbing] : no clubbing [No Cyanosis] : no cyanosis [No Edema] : no edema [FROM] : FROM [Normal Color/ Pigmentation] : normal color/ pigmentation [No Focal Deficits] : no focal deficits [Oriented x3] : oriented x3 [Normal Affect] : normal affect [TextBox_68] : scattered Rales rhonchi bilaterally delayed expiratory phase

## 2024-01-23 NOTE — REASON FOR VISIT
[Follow-Up - From Hospitalization] : a follow-up visit after a recent hospitalization [Asthma] : asthma [Sleep Apnea] : sleep apnea [Pneumonia] : pneumonia [TextBox_44] : Slowly continues to improve.  Still quite ill however has significant shortness of breath on minimal exertion.  She gets shortness of breath on a flight of stairs or walking back and forth to her car.  She feels she is about 50% improved from her baseline.  She remains disabled for her full degree of work.  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

## 2024-01-23 NOTE — ASSESSMENT
[FreeTextEntry1] : COPD (chronic obstructive pulmonary disease) (496) (J44.9) Morbid obesity (278.01) (E66.01) VANDANA (obstructive sleep apnea) (327.23) (G47.33) Assessment:  VANDANA Obesity PLAN:  The patient needs PAP device.  New supplies ordered  Weight loss discussed  I stressed the need maintain compliance with the PAP device.  The patient is not to use an Ozone or UV sterilizer.  F/U in 1 months  aspiration pneumonia asthma  We will continue current ICS/LABA and as needed albuterol. She will need repeat CAT scan of the chest in April. She is off steroids. She is off her oxygen.  She is currently disabled and cannot go back to work at her full position.  The patient can work from home or from a sedentary desk position. I will see monthly going forward until she is fully back to work.  It is unclear exactly how long it is going to take her to recuperate to a point where she can go back to her regular job description as her work is very labor intensive.  I will see her monthly moving forward.

## 2024-02-19 ENCOUNTER — LABORATORY RESULT (OUTPATIENT)
Age: 59
End: 2024-02-19

## 2024-02-20 ENCOUNTER — APPOINTMENT (OUTPATIENT)
Dept: UROGYNECOLOGY | Facility: CLINIC | Age: 59
End: 2024-02-20
Payer: COMMERCIAL

## 2024-02-20 VITALS
BODY MASS INDEX: 29.73 KG/M2 | DIASTOLIC BLOOD PRESSURE: 85 MMHG | SYSTOLIC BLOOD PRESSURE: 125 MMHG | WEIGHT: 185 LBS | HEIGHT: 66 IN | HEART RATE: 90 BPM

## 2024-02-20 DIAGNOSIS — M79.18 MYALGIA, OTHER SITE: ICD-10-CM

## 2024-02-20 DIAGNOSIS — R09.02 HYPOXEMIA: ICD-10-CM

## 2024-02-20 DIAGNOSIS — I83.93 ASYMPTOMATIC VARICOSE VEINS OF BILATERAL LOWER EXTREMITIES: ICD-10-CM

## 2024-02-20 DIAGNOSIS — M07.60 DISEASE OF INTESTINE, UNSPECIFIED: ICD-10-CM

## 2024-02-20 DIAGNOSIS — Z86.79 PERSONAL HISTORY OF OTHER DISEASES OF THE CIRCULATORY SYSTEM: ICD-10-CM

## 2024-02-20 DIAGNOSIS — D64.9 ANEMIA, UNSPECIFIED: ICD-10-CM

## 2024-02-20 DIAGNOSIS — Z87.39 PERSONAL HISTORY OF OTHER DISEASES OF THE MUSCULOSKELETAL SYSTEM AND CONNECTIVE TISSUE: ICD-10-CM

## 2024-02-20 DIAGNOSIS — G89.29 UPPER ABDOMINAL PAIN, UNSPECIFIED: ICD-10-CM

## 2024-02-20 DIAGNOSIS — Z87.898 PERSONAL HISTORY OF OTHER SPECIFIED CONDITIONS: ICD-10-CM

## 2024-02-20 DIAGNOSIS — M54.6 PAIN IN THORACIC SPINE: ICD-10-CM

## 2024-02-20 DIAGNOSIS — Z87.19 PERSONAL HISTORY OF OTHER DISEASES OF THE DIGESTIVE SYSTEM: ICD-10-CM

## 2024-02-20 DIAGNOSIS — E66.3 OVERWEIGHT: ICD-10-CM

## 2024-02-20 DIAGNOSIS — Z86.018 PERSONAL HISTORY OF OTHER BENIGN NEOPLASM: ICD-10-CM

## 2024-02-20 DIAGNOSIS — Z86.39 PERSONAL HISTORY OF OTHER ENDOCRINE, NUTRITIONAL AND METABOLIC DISEASE: ICD-10-CM

## 2024-02-20 DIAGNOSIS — Z86.19 PERSONAL HISTORY OF OTHER INFECTIOUS AND PARASITIC DISEASES: ICD-10-CM

## 2024-02-20 DIAGNOSIS — R39.11 HESITANCY OF MICTURITION: ICD-10-CM

## 2024-02-20 DIAGNOSIS — K63.9 DISEASE OF INTESTINE, UNSPECIFIED: ICD-10-CM

## 2024-02-20 DIAGNOSIS — N36.41 HYPERMOBILITY OF URETHRA: ICD-10-CM

## 2024-02-20 DIAGNOSIS — J69.0 PNEUMONITIS DUE TO INHALATION OF FOOD AND VOMIT: ICD-10-CM

## 2024-02-20 DIAGNOSIS — Z87.01 PERSONAL HISTORY OF PNEUMONIA (RECURRENT): ICD-10-CM

## 2024-02-20 DIAGNOSIS — Z87.09 PERSONAL HISTORY OF OTHER DISEASES OF THE RESPIRATORY SYSTEM: ICD-10-CM

## 2024-02-20 DIAGNOSIS — Z86.69 PERSONAL HISTORY OF OTHER DISEASES OF THE NERVOUS SYSTEM AND SENSE ORGANS: ICD-10-CM

## 2024-02-20 DIAGNOSIS — R10.10 UPPER ABDOMINAL PAIN, UNSPECIFIED: ICD-10-CM

## 2024-02-20 DIAGNOSIS — R74.01 ELEVATION OF LEVELS OF LIVER TRANSAMINASE LEVELS: ICD-10-CM

## 2024-02-20 PROCEDURE — 99205 OFFICE O/P NEW HI 60 MIN: CPT | Mod: 25

## 2024-02-20 PROCEDURE — 51701 INSERT BLADDER CATHETER: CPT

## 2024-02-20 RX ORDER — TIRZEPATIDE 5 MG/.5ML
5 INJECTION, SOLUTION SUBCUTANEOUS
Qty: 2 | Refills: 0 | Status: ACTIVE | COMMUNITY
Start: 2024-02-16

## 2024-02-20 RX ORDER — CYCLOBENZAPRINE HYDROCHLORIDE 5 MG/1
5 TABLET, FILM COATED ORAL
Qty: 60 | Refills: 2 | Status: ACTIVE | COMMUNITY
Start: 2024-02-20 | End: 1900-01-01

## 2024-02-20 RX ORDER — PEN NEEDLE, DIABETIC 32GX 5/32"
32G X 4 MM NEEDLE, DISPOSABLE MISCELLANEOUS
Qty: 100 | Refills: 0 | Status: ACTIVE | COMMUNITY
Start: 2023-12-01

## 2024-02-20 NOTE — DISCUSSION/SUMMARY
[FreeTextEntry1] :  Urethral hypermobility- I advised the patient that she has no prolapse on exam. The patient showed me with her finger where she is feeling the bulge and she pointed to the urethral hypermobility. I explained that this is normal anatomy, not prolapse and is not the cause of her pain.  Myalgia- Discussed the pathophysiology of the above condition. Reviewed management options including medications (oral or vaginal suppository), injections, pelvic floor physical therapy, or referral for possible pudendal nerve blocks. The patient agrees to a referral to PT and medical management. The risks and benefits of flexeril was reviewed.  Atrophic vaginitis- We reviewed the risks, benefits, alternatives and indications of local estrogen therapy.  She does NOT opt to begin this therapy.   Urinary hesitancy- Will send the urine for testing to rule out infectious etiology.

## 2024-02-20 NOTE — REASON FOR VISIT
[TextEntry] : Reason for visit: New Pt  Voids per day:   2-3 Voids per night:   3 Urge incontinence Yes Stress incontinence: No Constipation: Yes Fecal incontinence: No Vaginal bulge: yes

## 2024-02-20 NOTE — PHYSICAL EXAM
[Chaperone Present] : A chaperone was present in the examining room during all aspects of the physical examination [FreeTextEntry1] : Void: 30 cc PVR:30  cc Urethra was prepped in sterile fashion and then a sterile non- indwelling catheter (14F) was used by me to drain the bladder. The patient tolerated the procedure well. Indication: Urinary Hesitancy    Well healed incision: Pfanstiel  normal perineal sensation normal perineal reflexes - cough stress test + atrophy + vestibular tenderness - prolapse + urethral hypermobility + bilateral levator ani spasm, + tenderness - urethral tenderness - bladder tenderness  Mild cervical tenderness surgically absent uterus  no Mesh exposure 1/5 Saira

## 2024-02-20 NOTE — COUNSELING
[FreeTextEntry1] :  We will notify you of the urine results if they are abnormal.  Please start taking the flexeril 5mg 1-2 tablets at night for the pressure and pain. It can make you sleepy  Please call my office if you have any issues with the cost or side effects of the medication.  Schedule a 6-week med check with either my PA, Lorena or my PA, Nikki (myalgia, hesitancy)  Referral to pelvic floor physical therapy  [facility] [address] [facility] [id number] [phone]  [facility] [address] [place] [id number] Phone: [phone]  Please sign a medical release form at the  so that we can work on getting the operative report from [facility]  Referral for routine gyn care: Dr Mel Schmid [address] [facility] [address] [phone]  You do not have vaginal prolapse. You have a uretheral hypermobility-that is the area where you think there is an abnormal bulge but there is no bulge

## 2024-02-20 NOTE — HISTORY OF PRESENT ILLNESS
[FreeTextEntry1] : Pt with pelvic floor dysfunction here for urogynecologic evaluation. She describes:   Chief PFD: pain  RN  Operative report reviewed: 1/26/2016: Dr Marcial Ramsay: supracervical hysterectomy/bilateral salpingectomy/abdominal sacrocolpopexy attached with tacks to the sacrum, anterior vaginal repair, posterior vaginal repair, retropubic sling tensioned with Metzenbaum scissors, cysto  Pelvic organ prolapse: s/p supracervical hysterectomy (fibroids)/transvaginal mesh for prolapse and incontinence, s/p abdominoplasty, +bulge, for months, denies splinting, reports leaning foward to urinate Stress urinary incontinence: +post void dribbling, no other WILIAN Overactive bladder syndrome: hx of DM (AIC 6), hx of sleep apnea (using machine), no urgency, no incontinence Voiding dysfunction: yes Incomplete bladder emptying, yes hesitancy  Lower urinary tract/vaginal symptoms: no recurrent UTIs per year, no hematuria, no dysuria, no bladder pain  Fecal incontinence: denies Defecatory dysfunction: hx of IBS, sausage Sexual dysfunction: pain at the opening including with oral sex  Pelvic pain: denies Vaginal dryness denies  Her pelvic floor symptoms are significantly bothersome and negatively impacting her quality of life.

## 2024-02-26 ENCOUNTER — APPOINTMENT (OUTPATIENT)
Dept: PULMONOLOGY | Facility: CLINIC | Age: 59
End: 2024-02-26
Payer: COMMERCIAL

## 2024-02-26 VITALS
HEIGHT: 67 IN | WEIGHT: 188 LBS | SYSTOLIC BLOOD PRESSURE: 122 MMHG | BODY MASS INDEX: 29.51 KG/M2 | HEART RATE: 101 BPM | OXYGEN SATURATION: 97 % | DIASTOLIC BLOOD PRESSURE: 80 MMHG

## 2024-02-26 LAB — URINE CULTURE <10: NORMAL

## 2024-02-26 PROCEDURE — G2211 COMPLEX E/M VISIT ADD ON: CPT | Mod: NC,1L

## 2024-02-26 PROCEDURE — 99214 OFFICE O/P EST MOD 30 MIN: CPT

## 2024-02-26 RX ORDER — BUDESONIDE AND FORMOTEROL FUMARATE DIHYDRATE 80; 4.5 UG/1; UG/1
80-4.5 AEROSOL RESPIRATORY (INHALATION) TWICE DAILY
Qty: 1 | Refills: 5 | Status: ACTIVE | COMMUNITY
Start: 2024-02-26 | End: 1900-01-01

## 2024-02-26 NOTE — ASSESSMENT
[FreeTextEntry1] : COPD (chronic obstructive pulmonary disease) (496) (J44.9) Morbid obesity (278.01) (E66.01) VANDANA (obstructive sleep apnea) (327.23) (G47.33) Assessment:  VANDANA Obesity PLAN:  The patient needs PAP device. New supplies ordered Weight loss discussed I stressed the need maintain compliance with the PAP device. The patient is not to use an Ozone or UV sterilizer. F/U in 1 months  aspiration pneumonia asthma  We will continue current ICS/LABA I will taper to the Symbicort 80 as she has been getting thrush as needed albuterol. She will need repeat CAT scan of the chest in May. She is off steroids. She is off her oxygen.  She is currently disabled and cannot go back to work at her full position.  The patient can work from home or from a sedentary desk position. I will see monthly going forward until she is fully back to work.  It is unclear exactly how long it is going to take her to recuperate to a point where she can go back to her regular job description as her work is very labor intensive.  I will see her monthly moving forward.

## 2024-02-26 NOTE — REASON FOR VISIT
[Follow-Up] : a follow-up visit [Asthma] : asthma [Sleep Apnea] : sleep apnea [Cough] : cough [Obesity] : obesity [TextBox_44] : Patient is slowly improving.  However, she feels she is nowhere near where she needs to be.  She is having a new issue where she suddenly gets palpitations and a feeling of her throat closing and tremendous irritability and panic.  It lasts for several minutes then disappears.  She thought she is getting an allergic attack.  However, it sounds more like atrial fibrillation.  She has an echo scheduled tomorrow with her cardiologist.  I would suggest a formal cardiac visit and possibly a Holter.  She still has decreased exercise tolerance.  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  She does not think she can go out into the field as of yet to deal with other clients.

## 2024-02-26 NOTE — PHYSICAL EXAM
[No Acute Distress] : no acute distress [Normal Oropharynx] : normal oropharynx [Normal Appearance] : normal appearance [No Neck Mass] : no neck mass [Normal Rate/Rhythm] : normal rate/rhythm [Normal S1, S2] : normal s1, s2 [No Murmurs] : no murmurs [No Resp Distress] : no resp distress [No Abnormalities] : no abnormalities [Benign] : benign [Normal Gait] : normal gait [No Cyanosis] : no cyanosis [No Clubbing] : no clubbing [No Edema] : no edema [FROM] : FROM [Normal Color/ Pigmentation] : normal color/ pigmentation [No Focal Deficits] : no focal deficits [Oriented x3] : oriented x3 [Normal Affect] : normal affect [TextBox_68] : scattered Rales rhonchi bilaterally delayed expiratory phase

## 2024-03-25 ENCOUNTER — APPOINTMENT (OUTPATIENT)
Dept: PULMONOLOGY | Facility: CLINIC | Age: 59
End: 2024-03-25
Payer: COMMERCIAL

## 2024-03-25 VITALS
BODY MASS INDEX: 29.03 KG/M2 | SYSTOLIC BLOOD PRESSURE: 128 MMHG | OXYGEN SATURATION: 96 % | HEIGHT: 67 IN | HEART RATE: 103 BPM | DIASTOLIC BLOOD PRESSURE: 78 MMHG | WEIGHT: 185 LBS

## 2024-03-25 PROCEDURE — 99214 OFFICE O/P EST MOD 30 MIN: CPT | Mod: 25

## 2024-03-25 PROCEDURE — G2211 COMPLEX E/M VISIT ADD ON: CPT | Mod: NC,1L

## 2024-03-25 NOTE — REASON FOR VISIT
[Follow-Up] : a follow-up visit [TextBox_44] : Feeling a little bit better but nowhere near normal.  She has shortness of breath on walking small distances.  She has difficulty walking up 1 flight of stairs.  She used to be able to walk 5 miles on the treadmill prior to the aspiration event.  She states her voice is still off and she has trouble pushing her voice while talking on the phone.  The patient still gets intermittent wheezing despite the medications.

## 2024-03-25 NOTE — PHYSICAL EXAM
[No Acute Distress] : no acute distress [Normal Oropharynx] : normal oropharynx [Normal Appearance] : normal appearance [No Neck Mass] : no neck mass [Normal Rate/Rhythm] : normal rate/rhythm [Normal S1, S2] : normal s1, s2 [No Murmurs] : no murmurs [No Resp Distress] : no resp distress [No Abnormalities] : no abnormalities [Benign] : benign [Normal Gait] : normal gait [No Clubbing] : no clubbing [No Cyanosis] : no cyanosis [FROM] : FROM [No Edema] : no edema [Normal Color/ Pigmentation] : normal color/ pigmentation [No Focal Deficits] : no focal deficits [Oriented x3] : oriented x3 [Normal Affect] : normal affect [TextBox_68] : scattered Rales rhonchi bilaterally delayed expiratory phase

## 2024-03-25 NOTE — ASSESSMENT
[FreeTextEntry1] : COPD (chronic obstructive pulmonary disease) (496) (J44.9) Morbid obesity (278.01) (E66.01) VANDANA (obstructive sleep apnea) (327.23) (G47.33) Assessment:  VANDANA Obesity PLAN:  The patient needs PAP device. New supplies ordered Weight loss discussed I stressed the need maintain compliance with the PAP device. The patient is not to use an Ozone or UV sterilizer. F/U in 1 months  aspiration pneumonia/pneumonitis asthma  We will continue current ICS/LABA I will taper to the Symbicort 80 as she has been getting thrush as needed albuterol. She will need repeat CAT scan of the chest in May. She is off steroids. She is off her oxygen. Full PFT CAT scan of her chest  She is currently disabled and cannot go back to work at her full position.  The patient can work from home or from a sedentary desk position. I will see monthly going forward until she is fully back to work.  It is unclear exactly how long it is going to take her to recuperate to a point where she can go back to her regular job description as her work is very labor intensive.  I will see her monthly moving forward.

## 2024-03-26 VITALS
SYSTOLIC BLOOD PRESSURE: 124 MMHG | HEIGHT: 62 IN | OXYGEN SATURATION: 97 % | DIASTOLIC BLOOD PRESSURE: 84 MMHG | WEIGHT: 185 LBS | HEART RATE: 107 BPM | BODY MASS INDEX: 34.04 KG/M2

## 2024-03-29 ENCOUNTER — OUTPATIENT (OUTPATIENT)
Dept: OUTPATIENT SERVICES | Facility: HOSPITAL | Age: 59
LOS: 1 days | End: 2024-03-29
Payer: COMMERCIAL

## 2024-03-29 ENCOUNTER — APPOINTMENT (OUTPATIENT)
Dept: PULMONOLOGY | Facility: HOSPITAL | Age: 59
End: 2024-03-29
Payer: COMMERCIAL

## 2024-03-29 DIAGNOSIS — Z98.890 OTHER SPECIFIED POSTPROCEDURAL STATES: Chronic | ICD-10-CM

## 2024-03-29 DIAGNOSIS — R06.02 SHORTNESS OF BREATH: ICD-10-CM

## 2024-03-29 PROCEDURE — 94729 DIFFUSING CAPACITY: CPT

## 2024-03-29 PROCEDURE — 94060 EVALUATION OF WHEEZING: CPT | Mod: 26

## 2024-03-29 PROCEDURE — 94727 GAS DIL/WSHOT DETER LNG VOL: CPT | Mod: 26

## 2024-03-29 PROCEDURE — 94010 BREATHING CAPACITY TEST: CPT

## 2024-03-29 PROCEDURE — 94729 DIFFUSING CAPACITY: CPT | Mod: 26

## 2024-03-29 PROCEDURE — 94727 GAS DIL/WSHOT DETER LNG VOL: CPT

## 2024-03-30 DIAGNOSIS — R06.02 SHORTNESS OF BREATH: ICD-10-CM

## 2024-04-04 ENCOUNTER — OUTPATIENT (OUTPATIENT)
Dept: OUTPATIENT SERVICES | Facility: HOSPITAL | Age: 59
LOS: 1 days | End: 2024-04-04
Payer: COMMERCIAL

## 2024-04-04 ENCOUNTER — RESULT REVIEW (OUTPATIENT)
Age: 59
End: 2024-04-04

## 2024-04-04 DIAGNOSIS — J84.9 INTERSTITIAL PULMONARY DISEASE, UNSPECIFIED: ICD-10-CM

## 2024-04-04 DIAGNOSIS — Z98.890 OTHER SPECIFIED POSTPROCEDURAL STATES: Chronic | ICD-10-CM

## 2024-04-04 DIAGNOSIS — J15.9 UNSPECIFIED BACTERIAL PNEUMONIA: ICD-10-CM

## 2024-04-04 PROCEDURE — 71250 CT THORAX DX C-: CPT

## 2024-04-04 PROCEDURE — 71250 CT THORAX DX C-: CPT | Mod: 26

## 2024-04-05 DIAGNOSIS — J15.9 UNSPECIFIED BACTERIAL PNEUMONIA: ICD-10-CM

## 2024-04-05 DIAGNOSIS — J84.9 INTERSTITIAL PULMONARY DISEASE, UNSPECIFIED: ICD-10-CM

## 2024-04-10 ENCOUNTER — APPOINTMENT (OUTPATIENT)
Dept: CARDIOLOGY | Facility: CLINIC | Age: 59
End: 2024-04-10
Payer: COMMERCIAL

## 2024-04-10 VITALS
SYSTOLIC BLOOD PRESSURE: 125 MMHG | DIASTOLIC BLOOD PRESSURE: 80 MMHG | WEIGHT: 183 LBS | OXYGEN SATURATION: 98 % | HEART RATE: 102 BPM | BODY MASS INDEX: 33.47 KG/M2

## 2024-04-10 VITALS — OXYGEN SATURATION: 98 % | HEIGHT: 62 IN | HEART RATE: 100 BPM

## 2024-04-10 DIAGNOSIS — E11.9 TYPE 2 DIABETES MELLITUS W/OUT COMPLICATIONS: ICD-10-CM

## 2024-04-10 DIAGNOSIS — R79.89 OTHER SPECIFIED ABNORMAL FINDINGS OF BLOOD CHEMISTRY: ICD-10-CM

## 2024-04-10 DIAGNOSIS — Z87.891 PERSONAL HISTORY OF NICOTINE DEPENDENCE: ICD-10-CM

## 2024-04-10 DIAGNOSIS — F41.9 ANXIETY DISORDER, UNSPECIFIED: ICD-10-CM

## 2024-04-10 DIAGNOSIS — Z83.49 FAMILY HISTORY OF OTHER ENDOCRINE, NUTRITIONAL AND METABOLIC DISEASES: ICD-10-CM

## 2024-04-10 DIAGNOSIS — R07.9 CHEST PAIN, UNSPECIFIED: ICD-10-CM

## 2024-04-10 DIAGNOSIS — R00.0 TACHYCARDIA, UNSPECIFIED: ICD-10-CM

## 2024-04-10 DIAGNOSIS — Z83.3 FAMILY HISTORY OF DIABETES MELLITUS: ICD-10-CM

## 2024-04-10 DIAGNOSIS — Z82.49 FAMILY HISTORY OF ISCHEMIC HEART DISEASE AND OTHER DISEASES OF THE CIRCULATORY SYSTEM: ICD-10-CM

## 2024-04-10 PROCEDURE — 93010 ELECTROCARDIOGRAM REPORT: CPT

## 2024-04-10 PROCEDURE — 99204 OFFICE O/P NEW MOD 45 MIN: CPT | Mod: 25

## 2024-04-10 RX ORDER — ROSUVASTATIN CALCIUM 5 MG/1
5 TABLET, FILM COATED ORAL
Qty: 90 | Refills: 0 | Status: COMPLETED | COMMUNITY
Start: 2024-02-01 | End: 2024-04-10

## 2024-04-10 RX ORDER — INSULIN LISPRO 100 U/ML
100 INJECTION, SOLUTION SUBCUTANEOUS
Qty: 72 | Refills: 0 | Status: DISCONTINUED | COMMUNITY
Start: 2024-01-16 | End: 2024-04-10

## 2024-04-10 NOTE — REASON FOR VISIT
[Other: ____] : [unfilled] [FreeTextEntry1] : Diagnostic Tests: ------------------------ EK/10/24-NSR. Normal EKG.  23-Sinus tachycardia. Low voltage. NSST.  ------------------------ Echo:  24-TTE: EF 62%. Diastolic dysfunction. Mild MAC.  ----------------------- CT: 24-CT Chest: interval resolution of previous patchy ground glass opacities. Scattered coronary calcifications.  24-CT CAC: CAC 19 (LAD).

## 2024-04-10 NOTE — REVIEW OF SYSTEMS
[SOB] : shortness of breath [Negative] : Heme/Lymph [Dyspnea on exertion] : dyspnea during exertion [Chest Discomfort] : chest discomfort

## 2024-04-10 NOTE — HISTORY OF PRESENT ILLNESS
[FreeTextEntry1] :  is 59yo F with PMHx of asthma/restrictive lung disease, anxiety, DM, VANDANA on CPAP, fatty liver who presents to establish care. Her PMD is Dr. Ann Del Valle. She follows with pulmonology, had chest CT on 04/04/24 showing coronary calcifications. She has complains of SOB. She had colonoscopy in November which she then had aspiration event. She has been having issues since. She has been having some voice weakness. She had CP, SOB while hospitalized. She has been having elevated resting HR. She is currently being worked up for possible RA. She previously had been on nebivolol but stopped due to lower BP. She previously followed with Dr. Barnett who recommended starting statins for elevated CAC and DM.

## 2024-04-11 ENCOUNTER — RX RENEWAL (OUTPATIENT)
Age: 59
End: 2024-04-11

## 2024-04-12 ENCOUNTER — RX RENEWAL (OUTPATIENT)
Age: 59
End: 2024-04-12

## 2024-04-12 RX ORDER — DILTIAZEM HYDROCHLORIDE 90 MG/1
90 TABLET ORAL
Qty: 2 | Refills: 0 | Status: ACTIVE | COMMUNITY
Start: 2024-04-10 | End: 1900-01-01

## 2024-04-23 ENCOUNTER — APPOINTMENT (OUTPATIENT)
Dept: PULMONOLOGY | Facility: CLINIC | Age: 59
End: 2024-04-23
Payer: COMMERCIAL

## 2024-04-23 PROCEDURE — G2211 COMPLEX E/M VISIT ADD ON: CPT | Mod: NC,1L

## 2024-04-23 PROCEDURE — 99214 OFFICE O/P EST MOD 30 MIN: CPT

## 2024-04-24 ENCOUNTER — APPOINTMENT (OUTPATIENT)
Age: 59
End: 2024-04-24

## 2024-04-24 NOTE — ASSESSMENT
[FreeTextEntry1] : COPD (chronic obstructive pulmonary disease) (496) (J44.9) Morbid obesity (278.01) (E66.01) VANDANA (obstructive sleep apnea) (327.23) (G47.33) Assessment:  VANDANA Obesity PLAN:  The patient needs to use the PAP device. New supplies ordered Weight loss discussed  aspiration pneumonia/pneumonitis asthma  We will continue current ICS/LABA I will taper to the Symbicort 80 as she has been getting thrush as needed albuterol. we discussed the new CAT  She is off steroids. She is off her oxygen. 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  The patient can work  from a sedentary desk position. I will see periodically going forward until she is fully back to work.  It is unclear exactly how long it is going to take her to recuperate to a point where she can go back to her regular job description as her work is very labor intensive. He has been improving and will f/u with cardiology.

## 2024-04-24 NOTE — REASON FOR VISIT
[Follow-Up] : a follow-up visit [TextBox_44] : Pt feeling better but still having SOB on exertion. She is also having palpitations. Now seeing cardiology for a W/U. Having atypical pains in her chest.

## 2024-04-30 ENCOUNTER — OUTPATIENT (OUTPATIENT)
Dept: OUTPATIENT SERVICES | Facility: HOSPITAL | Age: 59
LOS: 1 days | End: 2024-04-30
Payer: COMMERCIAL

## 2024-04-30 ENCOUNTER — RESULT REVIEW (OUTPATIENT)
Age: 59
End: 2024-04-30

## 2024-04-30 DIAGNOSIS — Z98.890 OTHER SPECIFIED POSTPROCEDURAL STATES: Chronic | ICD-10-CM

## 2024-04-30 DIAGNOSIS — R07.9 CHEST PAIN, UNSPECIFIED: ICD-10-CM

## 2024-04-30 DIAGNOSIS — Z00.8 ENCOUNTER FOR OTHER GENERAL EXAMINATION: ICD-10-CM

## 2024-04-30 PROCEDURE — 75574 CT ANGIO HRT W/3D IMAGE: CPT

## 2024-04-30 PROCEDURE — 75574 CT ANGIO HRT W/3D IMAGE: CPT | Mod: 26

## 2024-05-01 DIAGNOSIS — R07.9 CHEST PAIN, UNSPECIFIED: ICD-10-CM

## 2024-05-02 ENCOUNTER — APPOINTMENT (OUTPATIENT)
Dept: CARDIOLOGY | Facility: CLINIC | Age: 59
End: 2024-05-02
Payer: COMMERCIAL

## 2024-05-02 PROCEDURE — 93228 REMOTE 30 DAY ECG REV/REPORT: CPT

## 2024-05-14 ENCOUNTER — APPOINTMENT (OUTPATIENT)
Dept: ORTHOPEDIC SURGERY | Facility: CLINIC | Age: 59
End: 2024-05-14
Payer: COMMERCIAL

## 2024-05-14 VITALS — HEIGHT: 67 IN | BODY MASS INDEX: 27.47 KG/M2 | WEIGHT: 175 LBS

## 2024-05-14 DIAGNOSIS — M19.042 PRIMARY OSTEOARTHRITIS, LEFT HAND: ICD-10-CM

## 2024-05-14 PROCEDURE — 73130 X-RAY EXAM OF HAND: CPT | Mod: LT

## 2024-05-14 PROCEDURE — 99203 OFFICE O/P NEW LOW 30 MIN: CPT | Mod: 25

## 2024-05-14 PROCEDURE — A4467: CPT | Mod: LT

## 2024-05-14 RX ORDER — ROSUVASTATIN CALCIUM 5 MG/1
TABLET, FILM COATED ORAL
Refills: 0 | Status: ACTIVE | COMMUNITY

## 2024-05-14 RX ORDER — ALPRAZOLAM 2 MG/1
TABLET ORAL
Refills: 0 | Status: ACTIVE | COMMUNITY

## 2024-05-14 RX ORDER — MELOXICAM 15 MG/1
15 TABLET ORAL
Qty: 30 | Refills: 0 | Status: ACTIVE | COMMUNITY
Start: 2024-05-14 | End: 1900-01-01

## 2024-05-14 RX ORDER — NEBIVOLOL 20 MG/1
TABLET ORAL
Refills: 0 | Status: ACTIVE | COMMUNITY

## 2024-05-14 NOTE — HISTORY OF PRESENT ILLNESS
[de-identified] : 58-year-old female comes in today for evaluation of her left hand and wrist pain that has been going on now for 2 days.  No specific injury or trauma.  She has difficulty moving the thumb and the wrist, difficulty grasping.  States she has history of possible ankylosing spondylitis.  She states that her rheumatoid factor in the past was elevated.  She has an appointment with a rheumatologist in few months. History of diabetes, anxiety, pneumonitis, asthma

## 2024-05-14 NOTE — ASSESSMENT
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  Recommending anti-inflammatory, bracing with CMC thumb guard brace.  Meloxicam sent to the pharmacy, recommended that she consult with her cardiologist prior to taking the meloxicam.  Follow-up in our hand department follow-up, could consider CMC cortisone injection if pain worsens or continues.  She has a consult scheduled with rheumatology as well.

## 2024-05-14 NOTE — IMAGING
[de-identified] : On examination of the left hand and wrist mild swelling around the CMC joint compared to the right hand.  No ecchymosis, no erythema, no palpable warmth that I can detect.  Tenderness is noted directly over the first CMC joint.  Mild tenderness around the radial styloid.  No tenderness of the snuffbox.  No tenderness over the ulnar styloid.  No tenderness over the fingers.  She is able to make a full fist.  She has some restriction and pain through range of motion of the thumb at the MCP and CMC joint.  X-ray left hand no obvious fracture or dislocation, osteoarthritis first CMC joint

## 2024-05-21 ENCOUNTER — APPOINTMENT (OUTPATIENT)
Dept: UROGYNECOLOGY | Facility: CLINIC | Age: 59
End: 2024-05-21

## 2024-05-22 ENCOUNTER — APPOINTMENT (OUTPATIENT)
Dept: PULMONOLOGY | Facility: CLINIC | Age: 59
End: 2024-05-22
Payer: COMMERCIAL

## 2024-05-22 VITALS
SYSTOLIC BLOOD PRESSURE: 122 MMHG | HEIGHT: 67 IN | DIASTOLIC BLOOD PRESSURE: 74 MMHG | BODY MASS INDEX: 27.47 KG/M2 | HEART RATE: 86 BPM | WEIGHT: 175 LBS | OXYGEN SATURATION: 98 %

## 2024-05-22 DIAGNOSIS — N95.2 POSTMENOPAUSAL ATROPHIC VAGINITIS: ICD-10-CM

## 2024-05-22 DIAGNOSIS — R06.02 SHORTNESS OF BREATH: ICD-10-CM

## 2024-05-22 PROCEDURE — 99214 OFFICE O/P EST MOD 30 MIN: CPT

## 2024-05-22 PROCEDURE — G2211 COMPLEX E/M VISIT ADD ON: CPT | Mod: NC

## 2024-05-22 NOTE — REASON FOR VISIT
[Follow-Up] : a follow-up visit [Abnormal CXR/ Chest CT] : an abnormal CXR/ chest CT [Asthma] : asthma [Sleep Apnea] : sleep apnea [Shortness of Breath] : shortness of breath [Wheezing] : wheezing

## 2024-05-22 NOTE — ASSESSMENT
[FreeTextEntry1] :  Subjective:   - Summary: Ms. Molina is a patient with ongoing respiratory issues following a recent hospitalization. She reports difficulty breathing, shallow breathing, and feeling like she has to consciously control her breathing. She is concerned about her ability to return to her job, which involves physically demanding tasks.   - Chief Complaint (CC): Difficulty breathing, shallow breathing, and concerns about returning to work.   - History of Present Illness (HPI):     - Chief Complaint: Difficulty breathing, shallow breathing     - Onset of Symptoms: Following a recent hospitalization     - Characterization of the Discomfort: Feeling like she has to consciously control her breathing, shallow breathing, difficulty with physical exertion     - Duration: Ongoing since hospitalization, approximately 6 months ago     - Location: Respiratory system     - Severity: Significant impact on daily activities and ability to work     - Aggravating Factors: Physical exertion, carrying equipment for work     - Associated Signs and Symptoms: Elevated hemoglobin and hematocrit levels, occasional elevated blood pressure and heart rate     - Temporal Factors: Symptoms persist throughout the day     - Context: Concerns about returning to physically demanding job     - Associated Symptoms: Anxiety, stress related to work and health concerns     - Severity and Impact: Significant impact on daily activities and ability to work     - Patient's Medical Regimen: Using rescue inhaler, trying beta-blocker intermittently for blood pressure control     - Contextual Factors: Concerns about job requirements, ability to perform duties, and potential loss of income/benefits     - Recent Medical Interventions: Recent blood work showing elevated hemoglobin and hematocrit levels, echocardiogram showing no blockages   - Family History: Grandmother had rheumatoid arthritis   - Review of Systems: Respiratory system: Difficulty breathing, shallow breathing, feeling like she has to consciously control breathing. Cardiovascular system: Occasional elevated blood pressure and heart rate. Musculoskeletal system: Able to walk at will previously, but now struggles with mild exertion.   - Medications: Rescue inhaler, intermittent beta-blocker   Objective:   - Diagnostic Results: Recent blood work showing elevated hemoglobin (16.9 g/dL)  Echocardiogram showing no blockages. Previous CT scan showing slight scarring in the right upper and middle lobes.    Assessment:   - Summary: Ms. Molina is a patient with ongoing respiratory issues following a recent hospitalization, likely due to a combination of factors including asthma, restrictive lung disease from scarring/irritation, deconditioning from prolonged inactivity, and sleep apnea. Her elevated hemoglobin and hematocrit levels are not concerning for hypoxia. She has concerns about her ability to return to her physically demanding job.   - Problems:     - Respiratory issues following hospitalization     - Asthma     - Restrictive lung disease     - Deconditioning     - Potential sleep apnea   - Differential Diagnosis:     - Asthma exacerbation     - Restrictive lung disease due to scarring/irritation from aspiration event     - Deconditioning from prolonged inactivity     - Sleep apnea     - Anxiety/stress contributing to respiratory symptoms   Plan:   - Summary: The plan is to clear Ms. Molina for full duty work, but with the understanding that she may not be able to perform all job duties . A gradual return to work with a reduced patient load is recommended, along with continued monitoring of her respiratory status. Pulmonary rehabilitation, an exercise program, and follow-up with her cardiologist are also recommended.   - Plan:     - Clear patient for full duty work, but with the understanding that she may not be able to perform all job duties initially     - Recommend a gradual return to work with a reduced patient load     - Provide a letter to the patient's employer explaining the situation and recommendations     - Recommend pulmonary rehabilitation or an exercise program to improve conditioning     - Follow-up with cardiologist for continued monitoring of cardiovascular status and potential sleep apnea     - Repeat CT scan at a later date to assess for any changes in lung scarring     - Continue use of rescue inhaler and intermittent beta-blocker as needed     - Follow-up visit in 1 month to reassess respiratory status and ability to perform job duties

## 2024-06-14 ENCOUNTER — APPOINTMENT (OUTPATIENT)
Dept: ORTHOPEDIC SURGERY | Facility: CLINIC | Age: 59
End: 2024-06-14

## 2024-07-02 ENCOUNTER — APPOINTMENT (OUTPATIENT)
Dept: PULMONOLOGY | Facility: CLINIC | Age: 59
End: 2024-07-02
Payer: COMMERCIAL

## 2024-07-02 VITALS
BODY MASS INDEX: 27.47 KG/M2 | OXYGEN SATURATION: 97 % | HEIGHT: 67 IN | DIASTOLIC BLOOD PRESSURE: 82 MMHG | SYSTOLIC BLOOD PRESSURE: 124 MMHG | WEIGHT: 175 LBS | HEART RATE: 92 BPM

## 2024-07-02 DIAGNOSIS — J45.909 UNSPECIFIED ASTHMA, UNCOMPLICATED: ICD-10-CM

## 2024-07-02 DIAGNOSIS — M06.9 RHEUMATOID ARTHRITIS, UNSPECIFIED: ICD-10-CM

## 2024-07-02 DIAGNOSIS — G47.33 OBSTRUCTIVE SLEEP APNEA (ADULT) (PEDIATRIC): ICD-10-CM

## 2024-07-02 DIAGNOSIS — J69.0 PNEUMONITIS DUE TO INHALATION OF FOOD AND VOMIT: ICD-10-CM

## 2024-07-02 PROCEDURE — 99214 OFFICE O/P EST MOD 30 MIN: CPT

## 2024-07-02 PROCEDURE — G2211 COMPLEX E/M VISIT ADD ON: CPT | Mod: NC

## 2024-07-03 ENCOUNTER — APPOINTMENT (OUTPATIENT)
Dept: GASTROENTEROLOGY | Facility: CLINIC | Age: 59
End: 2024-07-03
Payer: COMMERCIAL

## 2024-07-03 VITALS
SYSTOLIC BLOOD PRESSURE: 115 MMHG | BODY MASS INDEX: 26.37 KG/M2 | WEIGHT: 168 LBS | DIASTOLIC BLOOD PRESSURE: 83 MMHG | HEART RATE: 90 BPM | HEIGHT: 67 IN | OXYGEN SATURATION: 98 %

## 2024-07-03 DIAGNOSIS — K76.0 FATTY (CHANGE OF) LIVER, NOT ELSEWHERE CLASSIFIED: ICD-10-CM

## 2024-07-03 DIAGNOSIS — E88.810 METABOLIC SYNDROME: ICD-10-CM

## 2024-07-03 DIAGNOSIS — Z14.8 GENETIC CARRIER OF OTHER DISEASE: ICD-10-CM

## 2024-07-03 DIAGNOSIS — R74.8 ABNORMAL LEVELS OF OTHER SERUM ENZYMES: ICD-10-CM

## 2024-07-03 PROCEDURE — 99215 OFFICE O/P EST HI 40 MIN: CPT

## 2024-07-03 PROCEDURE — 91200 LIVER ELASTOGRAPHY: CPT

## 2024-07-22 ENCOUNTER — APPOINTMENT (OUTPATIENT)
Dept: GASTROENTEROLOGY | Facility: CLINIC | Age: 59
End: 2024-07-22
Payer: COMMERCIAL

## 2024-07-22 VITALS — BODY MASS INDEX: 26.37 KG/M2 | HEIGHT: 67 IN | WEIGHT: 168 LBS

## 2024-07-22 DIAGNOSIS — R79.89 OTHER SPECIFIED ABNORMAL FINDINGS OF BLOOD CHEMISTRY: ICD-10-CM

## 2024-07-22 DIAGNOSIS — K86.2 CYST OF PANCREAS: ICD-10-CM

## 2024-07-22 PROCEDURE — 99214 OFFICE O/P EST MOD 30 MIN: CPT

## 2024-07-22 NOTE — ASSESSMENT
[FreeTextEntry1] : 9mm pancreatic body cyst- MRI reviewed Adenomyomatosis of the GB Colonoscopy ?up to date with screening?  Pt's pulmonary status has improved but remains dyspneic at times. No explicit evaluation from pulm on whether she could undergo endoscopic work up  Plan:  Once cleared and improved from the pulm stand point will perform EUS Colonoscopy to be considered afterwards Follow up with hepatology RTC in 3 months Repeat MRI MRCP Pancreatic protocol

## 2024-07-22 NOTE — HISTORY OF PRESENT ILLNESS
[FreeTextEntry1] : 58 year old  F with T2DM HTN HLD VANDANA seen for an incidental pancreatic body cyst. The evaluated by pathology for elevated liver tests and hepatic steatosis on imaging on a subcentimeter pancreatic body lesion was described.  The lesion was suggestive of a sidebranch IPMN her main PD was not dilated to no worrisome or high risk described.  Patient denies pancreatitis or jaundice.  Today she comes to the office using supplemental oxygen.  She states that she aspirated during a colonoscopy in an outpatient office.  Hepatology note  Reports admission for FUO with ALT in 200's in  with no definitive diagnosis but states was EBV positive. Liver tests improved but has mild ALT elevation since then.  Patient had fatty liver on US in 2019 but reports that imaging prior to  showed fatty liver.  Had MRI abdomen which showed fatty liver and 9 mm IPMN Has been having upper abdominal pain for 1 years. Pain occurs 1 hour after eating. Has not noticed any specific foods.  No other increasing factors. No reducing factors. Not noticed relation to bowel movement or position.  Reports pain LUQ as well. Has bloating as well. Symptoms have improved with diet changes. Takes low gluten diet due to family hx of gluten intolerance. Has lactose intolerance as well. Had EGD done today showing hiatal hernia grade A esophagitis  with Dr Maury HOOK.  Constipated at times. Stool thin but soft. No diarrhea. Had colonoscopy 10 years ago.  Has brain fog for some time. No jaundice hematemesis or melena.  Reports diabetes controlled with wt loss and feels berberine helped. Lost 20 lb in 2 months intentionally.  HBa1c has improved from 8 to 6. Takes Livko - milk thistle schisandra and colleen. Could not tolerate Ozempic due tow worsening GI symptoms. Was on it from May to 2023.  Has DENTON intermittent and told had scarring on lungs. ET varies at times. Sometimes cannot even walk a flight of stairs and sometimes can walk a mile.  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 Recent ESR CRP normal. Has phototoxic skin rash. Had flexural rash in elbows as a kid. Has been having sweats for past 4 months. Had hysterectomy years ago with retention of ovaries.   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  Lives with partner. No kids Social alcohol use.  Smoker till 26 years of age. About 3/4 pack a day for 5 years. No drug use.  No liver cancer in family. LIver cirrhosis in maternal grandmother who  at 57. Father had MM.  Reports hemochromatosis in maternal GM.  Brother has Congenital adrenal hyperplasia.  Colon cancer in aunt.

## 2024-07-24 ENCOUNTER — APPOINTMENT (OUTPATIENT)
Dept: CARDIOLOGY | Facility: CLINIC | Age: 59
End: 2024-07-24
Payer: COMMERCIAL

## 2024-07-24 VITALS
BODY MASS INDEX: 26.37 KG/M2 | HEART RATE: 87 BPM | DIASTOLIC BLOOD PRESSURE: 70 MMHG | SYSTOLIC BLOOD PRESSURE: 110 MMHG | HEIGHT: 67 IN | OXYGEN SATURATION: 96 % | WEIGHT: 168 LBS

## 2024-07-24 PROCEDURE — G2211 COMPLEX E/M VISIT ADD ON: CPT | Mod: NC

## 2024-07-24 PROCEDURE — 93000 ELECTROCARDIOGRAM COMPLETE: CPT

## 2024-07-24 PROCEDURE — 99214 OFFICE O/P EST MOD 30 MIN: CPT | Mod: 25

## 2024-07-24 NOTE — HISTORY OF PRESENT ILLNESS
[FreeTextEntry1] :  is 58yo F with PMHx of asthma/restrictive lung disease, anxiety, DM, VANDANA on CPAP, fatty liver who presents for follow up. Her PMD is Dr. Ann Del Valle. She follows with pulmonology, had chest CT on 04/04/24 showing coronary calcifications. She has complains of SOB. She had colonoscopy in November which she then had aspiration event. She has been having issues since. She has been having some voice weakness. She had CP, SOB while hospitalized. She has been having elevated resting HR. She is currently being worked up for possible RA. She previously had been on nebivolol but stopped due to lower BP. She previously followed with Dr. Barnett who recommended starting statins for elevated CAC and DM.  07/24/24-Patient is feeling well. She lost weight with Mounjaro. She still does not want to start statin.

## 2024-07-24 NOTE — HISTORY OF PRESENT ILLNESS
[FreeTextEntry1] :  is 60yo F with PMHx of asthma/restrictive lung disease, anxiety, DM, VANDANA on CPAP, fatty liver who presents for follow up. Her PMD is Dr. Ann Del Valle. She follows with pulmonology, had chest CT on 04/04/24 showing coronary calcifications. She has complains of SOB. She had colonoscopy in November which she then had aspiration event. She has been having issues since. She has been having some voice weakness. She had CP, SOB while hospitalized. She has been having elevated resting HR. She is currently being worked up for possible RA. She previously had been on nebivolol but stopped due to lower BP. She previously followed with Dr. Barnett who recommended starting statins for elevated CAC and DM.  07/24/24-Patient is feeling well. She lost weight with Mounjaro. She still does not want to start statin.

## 2024-07-24 NOTE — REASON FOR VISIT
[Other: ____] : [unfilled] [FreeTextEntry1] : Diagnostic Tests: ------------------------ EK24-NSR. Normal EKG.  04/10/24-NSR. Normal EKG.  23-Sinus tachycardia. Low voltage. NSST.  ------------------------ Echo:  24-TTE: EF 62%. Diastolic dysfunction. Mild MAC.  ----------------------- CT: 24-CCTA: CAC 38 (LAD). LM none, mLAD mild, LCx minor, RCA, minor.  24-CT Chest: interval resolution of previous patchy ground glass opacities. Scattered coronary calcifications.  24-CT CAC: CAC 19 (LAD).  ---------------------- Holter:  04/10-24-MCOT 2 weeks: HR  bpm (87 bpm). No arrhythmias. Symptoms with ST.

## 2024-07-24 NOTE — ASSESSMENT
[FreeTextEntry1] : CP/SOB: Pt with SOB and elevated HR with minimal exertion.  -Non-obstructive CAD.  -Pt still does not want to take statins or PCSK9i at this time.  -Cannot due exercise stress due to SOB and also with labile HR.   Sinus tachycardia: Previous on BB. IST?  -Continue to monitor.  -Continue with nebivolol PRN.   HLD: Minimal CAC elevation, but with DM -Discussed benefit of statin with elevated CAC, JORJE and DM.  -Patient will consider starting rosuvastatin prescribed by Dr. Barnett.  -Discussed therapeutic lifestyle changes to promote improved lipid metabolism.  -Check US Carotid.   DM: HA1c 6.4% (05/24) -Continue with Mounjaro.   Follow up in 4 months.

## 2024-10-01 ENCOUNTER — INPATIENT (INPATIENT)
Facility: HOSPITAL | Age: 59
LOS: 2 days | Discharge: HOME | DRG: 872 | End: 2024-10-04
Attending: INTERNAL MEDICINE | Admitting: FAMILY MEDICINE
Payer: COMMERCIAL

## 2024-10-01 VITALS
SYSTOLIC BLOOD PRESSURE: 119 MMHG | HEART RATE: 85 BPM | DIASTOLIC BLOOD PRESSURE: 76 MMHG | TEMPERATURE: 101 F | WEIGHT: 160.06 LBS | OXYGEN SATURATION: 99 % | RESPIRATION RATE: 18 BRPM

## 2024-10-01 DIAGNOSIS — Z98.890 OTHER SPECIFIED POSTPROCEDURAL STATES: Chronic | ICD-10-CM

## 2024-10-01 DIAGNOSIS — K52.9 NONINFECTIVE GASTROENTERITIS AND COLITIS, UNSPECIFIED: ICD-10-CM

## 2024-10-01 LAB
ALBUMIN SERPL ELPH-MCNC: 4.5 G/DL — SIGNIFICANT CHANGE UP (ref 3.5–5.2)
ALP SERPL-CCNC: 84 U/L — SIGNIFICANT CHANGE UP (ref 30–115)
ALT FLD-CCNC: 39 U/L — SIGNIFICANT CHANGE UP (ref 0–41)
ANION GAP SERPL CALC-SCNC: 10 MMOL/L — SIGNIFICANT CHANGE UP (ref 7–14)
APPEARANCE UR: CLEAR — SIGNIFICANT CHANGE UP
AST SERPL-CCNC: 29 U/L — SIGNIFICANT CHANGE UP (ref 0–41)
BACTERIA # UR AUTO: ABNORMAL /HPF
BASOPHILS # BLD AUTO: 0.05 K/UL — SIGNIFICANT CHANGE UP (ref 0–0.2)
BASOPHILS NFR BLD AUTO: 0.3 % — SIGNIFICANT CHANGE UP (ref 0–1)
BILIRUB SERPL-MCNC: 0.6 MG/DL — SIGNIFICANT CHANGE UP (ref 0.2–1.2)
BILIRUB UR-MCNC: NEGATIVE — SIGNIFICANT CHANGE UP
BUN SERPL-MCNC: 15 MG/DL — SIGNIFICANT CHANGE UP (ref 10–20)
CALCIUM SERPL-MCNC: 9.5 MG/DL — SIGNIFICANT CHANGE UP (ref 8.4–10.5)
CHLORIDE SERPL-SCNC: 104 MMOL/L — SIGNIFICANT CHANGE UP (ref 98–110)
CO2 SERPL-SCNC: 28 MMOL/L — SIGNIFICANT CHANGE UP (ref 17–32)
COLOR SPEC: YELLOW — SIGNIFICANT CHANGE UP
CREAT SERPL-MCNC: 0.9 MG/DL — SIGNIFICANT CHANGE UP (ref 0.7–1.5)
DIFF PNL FLD: NEGATIVE — SIGNIFICANT CHANGE UP
EGFR: 74 ML/MIN/1.73M2 — SIGNIFICANT CHANGE UP
EOSINOPHIL # BLD AUTO: 0.02 K/UL — SIGNIFICANT CHANGE UP (ref 0–0.7)
EOSINOPHIL NFR BLD AUTO: 0.1 % — SIGNIFICANT CHANGE UP (ref 0–8)
EPI CELLS # UR: PRESENT
GLUCOSE BLDC GLUCOMTR-MCNC: 90 MG/DL — SIGNIFICANT CHANGE UP (ref 70–99)
GLUCOSE SERPL-MCNC: 111 MG/DL — HIGH (ref 70–99)
GLUCOSE UR QL: NEGATIVE MG/DL — SIGNIFICANT CHANGE UP
HCT VFR BLD CALC: 44.2 % — SIGNIFICANT CHANGE UP (ref 37–47)
HGB BLD-MCNC: 15.8 G/DL — SIGNIFICANT CHANGE UP (ref 12–16)
IMM GRANULOCYTES NFR BLD AUTO: 0.3 % — SIGNIFICANT CHANGE UP (ref 0.1–0.3)
KETONES UR-MCNC: NEGATIVE MG/DL — SIGNIFICANT CHANGE UP
LACTATE SERPL-SCNC: 1.5 MMOL/L — SIGNIFICANT CHANGE UP (ref 0.7–2)
LEUKOCYTE ESTERASE UR-ACNC: ABNORMAL
LIDOCAIN IGE QN: 52 U/L — SIGNIFICANT CHANGE UP (ref 7–60)
LYMPHOCYTES # BLD AUTO: 0.97 K/UL — LOW (ref 1.2–3.4)
LYMPHOCYTES # BLD AUTO: 5.9 % — LOW (ref 20.5–51.1)
MAGNESIUM SERPL-MCNC: 1.9 MG/DL — SIGNIFICANT CHANGE UP (ref 1.8–2.4)
MCHC RBC-ENTMCNC: 31 PG — SIGNIFICANT CHANGE UP (ref 27–31)
MCHC RBC-ENTMCNC: 35.7 G/DL — SIGNIFICANT CHANGE UP (ref 32–37)
MCV RBC AUTO: 86.8 FL — SIGNIFICANT CHANGE UP (ref 81–99)
MONOCYTES # BLD AUTO: 1 K/UL — HIGH (ref 0.1–0.6)
MONOCYTES NFR BLD AUTO: 6.1 % — SIGNIFICANT CHANGE UP (ref 1.7–9.3)
MUCOUS THREADS # UR AUTO: PRESENT
NEUTROPHILS # BLD AUTO: 14.27 K/UL — HIGH (ref 1.4–6.5)
NEUTROPHILS NFR BLD AUTO: 87.3 % — HIGH (ref 42.2–75.2)
NITRITE UR-MCNC: NEGATIVE — SIGNIFICANT CHANGE UP
NRBC # BLD: 0 /100 WBCS — SIGNIFICANT CHANGE UP (ref 0–0)
PH UR: 6.5 — SIGNIFICANT CHANGE UP (ref 5–8)
PLATELET # BLD AUTO: 238 K/UL — SIGNIFICANT CHANGE UP (ref 130–400)
PMV BLD: 11.1 FL — HIGH (ref 7.4–10.4)
POTASSIUM SERPL-MCNC: 4.4 MMOL/L — SIGNIFICANT CHANGE UP (ref 3.5–5)
POTASSIUM SERPL-SCNC: 4.4 MMOL/L — SIGNIFICANT CHANGE UP (ref 3.5–5)
PROT SERPL-MCNC: 6.9 G/DL — SIGNIFICANT CHANGE UP (ref 6–8)
PROT UR-MCNC: NEGATIVE MG/DL — SIGNIFICANT CHANGE UP
RBC # BLD: 5.09 M/UL — SIGNIFICANT CHANGE UP (ref 4.2–5.4)
RBC # FLD: 11.6 % — SIGNIFICANT CHANGE UP (ref 11.5–14.5)
RBC CASTS # UR COMP ASSIST: 3 /HPF — SIGNIFICANT CHANGE UP (ref 0–4)
RENAL EPI CELLS # UR COMP ASSIST: PRESENT
SODIUM SERPL-SCNC: 142 MMOL/L — SIGNIFICANT CHANGE UP (ref 135–146)
SP GR SPEC: >1.03 — HIGH (ref 1–1.03)
SQUAMOUS # UR AUTO: 7 /HPF — HIGH (ref 0–5)
TRANS CELLS #/AREA URNS HPF: PRESENT
UROBILINOGEN FLD QL: 0.2 MG/DL — SIGNIFICANT CHANGE UP (ref 0.2–1)
WBC # BLD: 16.36 K/UL — HIGH (ref 4.8–10.8)
WBC # FLD AUTO: 16.36 K/UL — HIGH (ref 4.8–10.8)
WBC UR QL: 9 /HPF — HIGH (ref 0–5)

## 2024-10-01 PROCEDURE — 99285 EMERGENCY DEPT VISIT HI MDM: CPT

## 2024-10-01 PROCEDURE — 85025 COMPLETE CBC W/AUTO DIFF WBC: CPT

## 2024-10-01 PROCEDURE — 80048 BASIC METABOLIC PNL TOTAL CA: CPT

## 2024-10-01 PROCEDURE — 99222 1ST HOSP IP/OBS MODERATE 55: CPT

## 2024-10-01 PROCEDURE — 83036 HEMOGLOBIN GLYCOSYLATED A1C: CPT

## 2024-10-01 PROCEDURE — 80053 COMPREHEN METABOLIC PANEL: CPT

## 2024-10-01 PROCEDURE — 36415 COLL VENOUS BLD VENIPUNCTURE: CPT

## 2024-10-01 PROCEDURE — 74177 CT ABD & PELVIS W/CONTRAST: CPT | Mod: 26,MC

## 2024-10-01 PROCEDURE — 93010 ELECTROCARDIOGRAM REPORT: CPT

## 2024-10-01 PROCEDURE — 82962 GLUCOSE BLOOD TEST: CPT

## 2024-10-01 PROCEDURE — 85027 COMPLETE CBC AUTOMATED: CPT

## 2024-10-01 RX ORDER — CEFTRIAXONE SODIUM 1 G
1000 VIAL (EA) INJECTION EVERY 24 HOURS
Refills: 0 | Status: DISCONTINUED | OUTPATIENT
Start: 2024-10-01 | End: 2024-10-02

## 2024-10-01 RX ORDER — INSULIN LISPRO 100/ML
VIAL (ML) SUBCUTANEOUS AT BEDTIME
Refills: 0 | Status: DISCONTINUED | OUTPATIENT
Start: 2024-10-01 | End: 2024-10-04

## 2024-10-01 RX ORDER — SODIUM CHLORIDE IRRIG SOLUTION 0.9 %
1000 SOLUTION, IRRIGATION IRRIGATION
Refills: 0 | Status: DISCONTINUED | OUTPATIENT
Start: 2024-10-01 | End: 2024-10-04

## 2024-10-01 RX ORDER — ALPRAZOLAM 0.5 MG/1
0 TABLET ORAL
Qty: 0 | Refills: 0 | DISCHARGE

## 2024-10-01 RX ORDER — NEBIVOLOL 2.5 MG/1
5 TABLET ORAL DAILY
Refills: 0 | Status: DISCONTINUED | OUTPATIENT
Start: 2024-10-01 | End: 2024-10-04

## 2024-10-01 RX ORDER — FLUTICASONE PROPION/SALMETEROL 100-50 MCG
1 BLISTER, WITH INHALATION DEVICE INHALATION
Refills: 0 | Status: DISCONTINUED | OUTPATIENT
Start: 2024-10-01 | End: 2024-10-01

## 2024-10-01 RX ORDER — HYDROMORPHONE HYDROCHLORIDE 1 MG/ML
0.5 INJECTION, SOLUTION INTRAMUSCULAR; INTRAVENOUS; SUBCUTANEOUS EVERY 8 HOURS
Refills: 0 | Status: DISCONTINUED | OUTPATIENT
Start: 2024-10-01 | End: 2024-10-04

## 2024-10-01 RX ORDER — NEBIVOLOL 2.5 MG/1
1 TABLET ORAL
Refills: 0 | DISCHARGE

## 2024-10-01 RX ORDER — ALCOHOL ANTISEPTIC PADS
12.5 PADS, MEDICATED (EA) TOPICAL ONCE
Refills: 0 | Status: DISCONTINUED | OUTPATIENT
Start: 2024-10-01 | End: 2024-10-04

## 2024-10-01 RX ORDER — ALCOHOL ANTISEPTIC PADS
15 PADS, MEDICATED (EA) TOPICAL ONCE
Refills: 0 | Status: DISCONTINUED | OUTPATIENT
Start: 2024-10-01 | End: 2024-10-04

## 2024-10-01 RX ORDER — BUDESONIDE AND FORMOTEROL FUMARATE DIHYDRATE 80; 4.5 UG/1; UG/1
0 AEROSOL RESPIRATORY (INHALATION)
Qty: 0 | Refills: 0 | DISCHARGE

## 2024-10-01 RX ORDER — ACETAMINOPHEN 325 MG
650 TABLET ORAL EVERY 6 HOURS
Refills: 0 | Status: DISCONTINUED | OUTPATIENT
Start: 2024-10-01 | End: 2024-10-04

## 2024-10-01 RX ORDER — INSULIN LISPRO 100/ML
VIAL (ML) SUBCUTANEOUS
Refills: 0 | Status: DISCONTINUED | OUTPATIENT
Start: 2024-10-01 | End: 2024-10-04

## 2024-10-01 RX ORDER — ONDANSETRON HCL/PF 4 MG/2 ML
4 VIAL (ML) INJECTION EVERY 8 HOURS
Refills: 0 | Status: DISCONTINUED | OUTPATIENT
Start: 2024-10-01 | End: 2024-10-04

## 2024-10-01 RX ORDER — BUDESONIDE AND FORMOTEROL FUMARATE DIHYDRATE 80; 4.5 UG/1; UG/1
0 AEROSOL RESPIRATORY (INHALATION)
Qty: 0 | Refills: 1 | DISCHARGE

## 2024-10-01 RX ORDER — SODIUM CHLORIDE IRRIG SOLUTION 0.9 %
1000 SOLUTION, IRRIGATION IRRIGATION ONCE
Refills: 0 | Status: COMPLETED | OUTPATIENT
Start: 2024-10-01 | End: 2024-10-01

## 2024-10-01 RX ORDER — LEVALBUTEROL HYDROCHLORIDE 1.25 MG/3ML
0 SOLUTION RESPIRATORY (INHALATION)
Qty: 0 | Refills: 1 | DISCHARGE

## 2024-10-01 RX ORDER — CEFTRIAXONE SODIUM 1 G
1000 VIAL (EA) INJECTION ONCE
Refills: 0 | Status: COMPLETED | OUTPATIENT
Start: 2024-10-01 | End: 2024-10-01

## 2024-10-01 RX ORDER — ONDANSETRON HCL/PF 4 MG/2 ML
4 VIAL (ML) INJECTION ONCE
Refills: 0 | Status: COMPLETED | OUTPATIENT
Start: 2024-10-01 | End: 2024-10-01

## 2024-10-01 RX ORDER — ALCOHOL ANTISEPTIC PADS
25 PADS, MEDICATED (EA) TOPICAL ONCE
Refills: 0 | Status: DISCONTINUED | OUTPATIENT
Start: 2024-10-01 | End: 2024-10-04

## 2024-10-01 RX ORDER — LEVALBUTEROL HYDROCHLORIDE 1.25 MG/3ML
1.25 SOLUTION RESPIRATORY (INHALATION) EVERY 6 HOURS
Refills: 0 | Status: DISCONTINUED | OUTPATIENT
Start: 2024-10-01 | End: 2024-10-04

## 2024-10-01 RX ORDER — BUDESONIDE AND FORMOTEROL FUMARATE DIHYDRATE 80; 4.5 UG/1; UG/1
2 AEROSOL RESPIRATORY (INHALATION)
Refills: 0 | DISCHARGE

## 2024-10-01 RX ORDER — HYDROMORPHONE HYDROCHLORIDE 1 MG/ML
1 INJECTION, SOLUTION INTRAMUSCULAR; INTRAVENOUS; SUBCUTANEOUS ONCE
Refills: 0 | Status: DISCONTINUED | OUTPATIENT
Start: 2024-10-01 | End: 2024-10-01

## 2024-10-01 RX ORDER — SENNOSIDES 8.6 MG
2 TABLET ORAL AT BEDTIME
Refills: 0 | Status: DISCONTINUED | OUTPATIENT
Start: 2024-10-01 | End: 2024-10-03

## 2024-10-01 RX ORDER — GLUCAGON INJECTION, SOLUTION 0.5 MG/.1ML
1 INJECTION, SOLUTION SUBCUTANEOUS ONCE
Refills: 0 | Status: DISCONTINUED | OUTPATIENT
Start: 2024-10-01 | End: 2024-10-04

## 2024-10-01 RX ADMIN — Medication 4 MILLIGRAM(S): at 16:16

## 2024-10-01 RX ADMIN — Medication 100 MILLIGRAM(S): at 18:21

## 2024-10-01 RX ADMIN — HYDROMORPHONE HYDROCHLORIDE 1 MILLIGRAM(S): 1 INJECTION, SOLUTION INTRAMUSCULAR; INTRAVENOUS; SUBCUTANEOUS at 16:16

## 2024-10-01 RX ADMIN — Medication 100 MILLIGRAM(S): at 19:37

## 2024-10-01 RX ADMIN — Medication 1000 MILLILITER(S): at 16:17

## 2024-10-01 NOTE — H&P ADULT - ASSESSMENT
60 y/o F PMHx HTN, HLD, asthma,  rectal pain , constipation, chills,  rectal bleeding after attempting disimpaction, urinary frequency  and pressure, fever and chills today 58 y/o F PMHx HTN, HLD, asthma,  rectal pain , constipation, chills,  rectal bleeding after attempting disimpaction, urinary frequency  and pressure, fever and chills today    # Sepsis ( 101 temp, tachy, and elevated WBC in ED)  #Proctocolitis  CTAP:   Mildly thickened rectal wall with surrounding inflammatory changes, may reflect acute proctocolitis. Underlying mass within the rectum cannot be excluded. Follow-up recommended.  s/p CTX and flagyl in ED w/ 1 L fluif  -Cipro 400mg q12 + Flagyl 500mg q8  -GI consult  -F/U Blood and urine cx  - bowel regimen       # Asthma   - rescue inhaler  #DM  - diet controlled at home  - start ISS and monitor FSS    #HTN  - diet controlled at home    #Anxiety  - c/w alprazolam prn       58 y/o F PMHx HTN, HLD, asthma,  rectal pain , constipation, chills,  rectal bleeding after attempting disimpaction, urinary frequency  and pressure, fever and chills today    # Sepsis ( 101 temp, tachy, and elevated WBC in ED)  #Proctocolitis  #constipation  CTAP:   Mildly thickened rectal wall with surrounding inflammatory changes, may reflect acute proctocolitis. Underlying mass within the rectum cannot be excluded. Follow-up recommended.  s/p CTX and flagyl in ED w/ 1 L fluif  -Patient is on mounjaro, hold medication for now   -rocephin + Flagyl 500mg  -GI consult  -F/U Blood and urine cx  - bowel regimen   -clears      # Asthma   - rescue inhaler    #DM  - diet controlled at home  - start ISS and monitor FSS    #HTN  - diet controlled at home    #Anxiety  - c/w alprazolam prn    #VANDANA  -Continue with cpap      #Progress Note Handoff  Pending (specify):  as above   Family discussion:  plan of care was discussed with patient and family in details.  all questions were answered.  seems to understand, and in agreement  Disposition:  Home      58 y/o F PMHx HTN, HLD, asthma,  rectal pain , constipation, chills,  rectal bleeding after attempting disimpaction, urinary frequency  and pressure, fever and chills today    # Sepsis ( 101 temp, tachy, and elevated WBC in ED)  #Proctocolitis  #constipation  CTAP:   Mildly thickened rectal wall with surrounding inflammatory changes, may reflect acute proctocolitis. Underlying mass within the rectum cannot be excluded. Follow-up recommended.  s/p CTX and flagyl in ED w/ 1 L fluid  - c/w CTX and flagyl  - GI consult  - F/U Blood and urine cx  - bowel regimen   - CLD diet for now- advance diet as tolerated  - trend WBC and monitor fever curve    # Asthma   - c/w Xopenex prn ( non-formulary completed w/ only nebulizer version available)    #DM  -Patient is on mounjaro, hold medication for now   - diet controlled at home  - start ISS and monitor FSS    #HTN  - diet controlled   - monitor BPs    # H/O PVCs/sinus tach  - c/w nebivolol (  non-formulary completed)      #Anxiety  - c/w alprazolam prn    #VANDANA  -Continue with cpap      #Progress Note Handoff  Pending (specify):  as above   Family discussion:  plan of care was discussed with patient and family in details.  all questions were answered.  seems to understand, and in agreement  Disposition:  Home      60 y/o F PMHx HTN, HLD, asthma,  rectal pain , constipation, chills,  rectal bleeding after attempting disimpaction, urinary frequency  and pressure, fever and chills today    # Sepsis ( 101 temp, tachy, and elevated WBC in ED)  #Proctocolitis  #H/O IPMN  CTAP:   Mildly thickened rectal wall with surrounding inflammatory changes, may reflect acute proctocolitis. Underlying mass within the rectum cannot be excluded. Follow-up recommended.  s/p CTX and flagyl in ED w/ 1 L fluid  - c/w CTX and flagyl  - GI consult ( f/u with Dr. Dash Alan outpt)  - F/U Blood and urine cx  - bowel regimen   - CLD diet for now- advance diet as tolerated  - trend WBC and monitor fever curve    # Asthma   - c/w Xopenex prn ( non-formulary completed w/ only nebulizer version available)    #DM  -Patient is on mounjaro, hold medication for now   - diet controlled at home  - start ISS and monitor FSS    #HTN  - diet controlled   - monitor BPs    # H/O PVCs/sinus tach  - c/w nebivolol (  non-formulary completed)      #Anxiety  - c/w alprazolam prn    #VANDANA  -Continue with cpap      VTE ppx: SCDs  Diet - CLD - advance diet as tolerated)    #Progress Note Handoff  Pending (specify):  as above   Family discussion:  plan of care was discussed with patient and family in details.  all questions were answered.  seems to understand, and in agreement  Disposition:  Home      60 y/o F PMHx  DM2, HLD, IPMN, splenomegaly, and reactive airway disease c/o 1 day h/o severe rectal pain, pain w/ urination, & fever and nausea and 3 day h/o constipation. Pt reports she woke up feeling severe rectal pain and pelvic pressure, w/ feeling like she needed to pass BM, but unable to do despite trying for 3 hours. She noted she attempted disimpaction, but made pain worse, stating she also noted a few drops of bright red blood at the time, but attributed to her h/o hemorrhoids Pt reports 101 temp ~1:30 today, prompting ED visit    Pt denies vomiting, unable to tolerate PO intake, h/o chronic constipation, bloody BMs/urine, cough        # Sepsis ( 101 temp, tachy, and elevated WBC in ED)  #Proctocolitis  #H/O IPMN  CTAP:   Mildly thickened rectal wall with surrounding inflammatory changes, may reflect acute proctocolitis. Underlying mass within the rectum cannot be excluded. Follow-up recommended.  s/p CTX and flagyl in ED w/ 1 L fluid  - c/w CTX and flagyl  - GI consult ( f/u with Dr. Dash Alan outpt)  - F/U Blood and urine cx  - bowel regimen   - CLD diet for now- advance diet as tolerated  - trend WBC and monitor fever curve    # Asthma   - c/w Xopenex prn ( non-formulary completed w/ only nebulizer version available)    #DM  -Patient is on mounjaro, hold medication for now   - diet controlled at home  - start ISS and monitor FSS    #HTN  - diet controlled   - monitor BPs    # H/O PVCs/sinus tach  - c/w nebivolol (  non-formulary completed)      #Anxiety  - c/w alprazolam prn    #VANDANA  -Continue with cpap      VTE ppx: SCDs  Diet - CLD - advance diet as tolerated)    #Progress Note Handoff  Pending (specify):  as above   Family discussion:  plan of care was discussed with patient and family in details.  all questions were answered.  seems to understand, and in agreement  Disposition:  Home      60 y/o F PMHx  DM2, HLD, IPMN, splenomegaly, and reactive airway disease c/o 1 day h/o severe rectal pain, pain w/ urination, & fever and nausea and 3 day h/o constipation. Pt reports she woke up feeling severe rectal pain and pelvic pressure, w/ feeling like she needed to pass BM, but unable to do despite trying for 3 hours. She noted she attempted disimpaction, but made pain worse, stating she also noted a few drops of bright red blood at the time, but attributed to her h/o hemorrhoids Pt reports 101 temp ~1:30 today, prompting ED visit    Pt denies vomiting, unable to tolerate PO intake, h/o chronic constipation, bloody BMs/urine, cough        # Sepsis ( 101 temp, tachy, and elevated WBC in ED)  #Proctocolitis  #H/O IPMN  CTAP:   Mildly thickened rectal wall with surrounding inflammatory changes, may reflect acute proctocolitis. Underlying mass within the rectum cannot be excluded. Follow-up recommended.  s/p CTX and flagyl in ED w/ 1 L fluid  - c/w CTX and flagyl  - GI consult ( f/u with Dr. Dash Alan outpt)  - F/U Blood and urine cx  - bowel regimen   - CLD diet for now- advance diet as tolerated  - trend WBC and monitor fever curve  - trend H+H    # Asthma   - c/w Xopenex prn ( non-formulary completed w/ only nebulizer version available)    #DM  -Patient is on mounjaro, hold medication for now   - diet controlled at home  - start ISS and monitor FSS    #HTN  - diet controlled   - monitor BPs    # H/O PVCs/sinus tach  - c/w nebivolol (  non-formulary completed)      #Anxiety  - c/w alprazolam prn    #VANDANA  -Continue with cpap      VTE ppx: SCDs  Diet - CLD - advance diet as tolerated)    #Progress Note Handoff  Pending (specify):  as above   Family discussion:  plan of care was discussed with patient and family in details.  all questions were answered.  seems to understand, and in agreement  Disposition:  Home

## 2024-10-01 NOTE — ED PROVIDER NOTE - CLINICAL SUMMARY MEDICAL DECISION MAKING FREE TEXT BOX
Patient signed out to me pending CT scan results.  Patient here with lower abdominal discomfort.  Had some constipation and self disimpacted today.  Positive febrile, patient does have elevated white blood cell count.  CT scan reveals mildly thickened rectal wall with surrounding inflammatory changes.  Antibiotics initiated and will admit at this time.

## 2024-10-01 NOTE — ED PROVIDER NOTE - OBJECTIVE STATEMENT
Patient c/o rectal pain , constipation, chills,  rectal bleeding after attempting disimpaction, urinary frequency  and pressure, fever and chills today

## 2024-10-01 NOTE — CHART NOTE - NSCHARTNOTEFT_GEN_A_CORE
Confidential Drug Utilization Report  Search Terms: JAY VASQUES, 1965Search Date: 10/01/2024 20:58:03 PM      This report was requested by: Bernice Gutierres | Reference #: [id number]    Practitioner Count: 1  Pharmacy Count: 1  Current Opioid Prescriptions: 0  Current Benzodiazepine Prescriptions: 0  Current Stimulant Prescriptions: 0      Patient Demographic Information (PDI)       PDI	First Name	Last Name	Birth Date	Gender	Street Address	[facility] Code  A	Jay Vasques	1965	Female	[address]	[id number]      PDI	My Rx	Current Rx	Drug Type	Rx Written	Rx Dispensed	Drug	Quantity	Days Supply  A	N	N	O	08/02/2024	08/04/2024	oxycodone-acetaminophen 5-325 mg tab	6	1  Prescriber Name Rick Sharp  Prescriber AMOL # [id number]    A	N	N	B	01/30/2024	01/30/2024	alprazolam 0.5 mg tablet	120	30  Prescriber Name Ann Del Valle (DO)  Prescriber AMOL # [id number]    A	N	N	B	12/01/2023	12/01/2023	alprazolam 0.5 mg tablet	120	30  Prescriber Name Ann Del Valle (DO)  Prescriber AMOL # [id number]        Search Terms: JAY VASQUES, 1965Search Date: Oct 1, 2024 8:56:55 PMStates Searched: [place],MN,[place]      This report was requested by: Bernice Gutierres | Reference #: [id number]    Prescriptions Dispensed in [place]  There are no results for the search terms that you entered.    Prescriptions Dispensed in [place]  There are no results for the search terms that you entered.    Prescriptions Dispensed in [place]  There are no results for the search terms that you entered.    Prescriptions Dispensed in [place]  There are no results for the search terms that you entered.    Prescriptions Dispensed in [place]  There are no results for the search terms that you entered.    Prescriptions Dispensed in [place]  There are no results for the search terms that you entered.    Prescriptions Dispensed in [place]  This state's  has indicated that you do not have permission to perform this search.    Prescriptions Dispensed in [place]  There are no results for the search terms that you entered.    Prescriptions Dispensed in [place]  There are no results for the search terms that you entered.    Prescriptions Dispensed in [place]  There are no results for the search terms that you entered.    Prescriptions Dispensed in [place]  There are no results for the search terms that you entered.    Prescriptions Dispensed in [facility]  There are no results for the search terms that you entered.    Prescriptions Dispensed in [place]  There are no results for the search terms that you entered.    Prescriptions Dispensed in [place]  There are no results for the search terms that you entered.    Prescriptions Dispensed in [place]  There are no results for the search terms that you entered.    Prescriptions Dispensed in [place]  There are no results for the search terms that you entered.    Prescriptions Dispensed in [place]  There are no results for the search terms that you entered.    Prescriptions Dispensed in [place]  There are no results for the search terms that you entered.    Prescriptions Dispensed in [place]  There are no results for the search terms that you entered.    Prescriptions Dispensed in [place]  There are no results for the search terms that you entered.    Prescriptions Dispensed in [place]  There are no results for the search terms that you entered.    Prescriptions Dispensed in [facility]  There are no results for the search terms that you entered.    Prescriptions Dispensed in [place]  There are no results for the search terms that you entered.    Prescriptions Dispensed in [place]  There are no results for the search terms that you entered.    Prescriptions Dispensed in [place]  There are no results for the search terms that you entered.    Prescriptions Dispensed in [place]  There are no results for the search terms that you entered.    Prescriptions Dispensed in [place]  There are no results for the search terms that you entered.    Prescriptions Dispensed in [place]  There are no results for the search terms that you entered.    Prescriptions Dispensed in [place]  There are no results for the search terms that you entered.    Prescriptions Dispensed in [place]  There are no results for the search terms that you entered.    Prescriptions Dispensed in [place]  There are no results for the search terms that you entered.    Prescriptions Dispensed in [place]  There are no results for the search terms that you entered.    Prescriptions Dispensed in [place]  There are no results for the search terms that you entered.    Prescriptions Dispensed in [place]  There are no results for the search terms that you entered.    Prescriptions Dispensed in [place]  There are no results for the search terms that you entered.    Prescriptions Dispensed in [place]  There are no results for the search terms that you entered.    Prescriptions Dispensed in [place]  There are no results for the search terms that you entered.    Prescriptions Dispensed in [place]  There are no results for the search terms that you entered.    Prescriptions Dispensed in [place]  There are no results for the search terms that you entered.    * - Drugs marked with an asterisk are compound drugs. If the compound drug is made up of more than one controlled substance, then each controlled substance will be a separate row in the table.

## 2024-10-01 NOTE — H&P ADULT - NSHPLABSRESULTS_GEN_ALL_CORE
.  LABS:                         15.8   16.36 )-----------( 238      ( 01 Oct 2024 15:16 )             44.2     10-01    142  |  104  |  15  ----------------------------<  111[H]  4.4   |  28  |  0.9    Ca    9.5      01 Oct 2024 15:16  Mg     1.9     10-01    TPro  6.9  /  Alb  4.5  /  TBili  0.6  /  DBili  x   /  AST  29  /  ALT  39  /  AlkPhos  84  10-01      Urinalysis Basic - ( 01 Oct 2024 18:00 )    Color: Yellow / Appearance: Clear / SG: >1.030 / pH: x  Gluc: x / Ketone: Negative mg/dL  / Bili: Negative / Urobili: 0.2 mg/dL   Blood: x / Protein: Negative mg/dL / Nitrite: Negative   Leuk Esterase: Small / RBC: 3 /HPF / WBC 9 /HPF   Sq Epi: x / Non Sq Epi: 7 /HPF / Bacteria: Few /HPF        Lactate, Blood: 1.5 mmol/L (10-01 @ 15:16) .  LABS:                         15.8   16.36 )-----------( 238      ( 01 Oct 2024 15:16 )             44.2     10-01    142  |  104  |  15  ----------------------------<  111[H]  4.4   |  28  |  0.9    Ca    9.5      01 Oct 2024 15:16  Mg     1.9     10-01    TPro  6.9  /  Alb  4.5  /  TBili  0.6  /  DBili  x   /  AST  29  /  ALT  39  /  AlkPhos  84  10-01      Urinalysis Basic - ( 01 Oct 2024 18:00 )    Color: Yellow / Appearance: Clear / SG: >1.030 / pH: x  Gluc: x / Ketone: Negative mg/dL  / Bili: Negative / Urobili: 0.2 mg/dL   Blood: x / Protein: Negative mg/dL / Nitrite: Negative   Leuk Esterase: Small / RBC: 3 /HPF / WBC 9 /HPF   Sq Epi: x / Non Sq Epi: 7 /HPF / Bacteria: Few /HPF    KIDNEYS/URETERS: Symmetric renal enhancement. No hydronephrosis.    BLADDER: Within normal limits.  REPRODUCTIVE ORGANS: Status post hysterectomy.    BOWEL: Mildly thickened rectal wall with surrounding inflammatory changes   and fat stranding. No bowel obstruction or pneumoperitoneum. Colonic   diverticulosis without evidence of acute diverticulitis. Appendix is   normal.  PERITONEUM/RETROPERITONEUM: Within normal limits.  VESSELS: Mild atherosclerosis changes of the aorta and its branches.  LYMPH NODES: No lymphadenopathy.  ABDOMINAL WALL: Within normal limits.  BONES: Mild degenerative changes of the spine. Scoliosis    IMPRESSION:    1.  Mildly thickened rectal wall with surrounding inflammatory changes,   may reflect acute proctocolitis. Underlying mass within the rectum cannot   be excluded. Follow-up recommended.    --- End of Report ---      Lactate, Blood: 1.5 mmol/L (10-01 @ 15:16) .  LABS:                         15.8   16.36 )-----------( 238      ( 01 Oct 2024 15:16 )             44.2     10-01    142  |  104  |  15  ----------------------------<  111[H]  4.4   |  28  |  0.9    Ca    9.5      01 Oct 2024 15:16  Mg     1.9     10-01    TPro  6.9  /  Alb  4.5  /  TBili  0.6  /  DBili  x   /  AST  29  /  ALT  39  /  AlkPhos  84  10-01      Urinalysis Basic - ( 01 Oct 2024 18:00 )    Color: Yellow / Appearance: Clear / SG: >1.030 / pH: x  Gluc: x / Ketone: Negative mg/dL  / Bili: Negative / Urobili: 0.2 mg/dL   Blood: x / Protein: Negative mg/dL / Nitrite: Negative   Leuk Esterase: Small / RBC: 3 /HPF / WBC 9 /HPF   Sq Epi: x / Non Sq Epi: 7 /HPF / Bacteria: Few /HPF    KIDNEYS/URETERS: Symmetric renal enhancement. No hydronephrosis.    BLADDER: Within normal limits.  REPRODUCTIVE ORGANS: Status post hysterectomy.    BOWEL: Mildly thickened rectal wall with surrounding inflammatory changes   and fat stranding. No bowel obstruction or pneumoperitoneum. Colonic   diverticulosis without evidence of acute diverticulitis. Appendix is   normal.  PERITONEUM/RETROPERITONEUM: Within normal limits.  VESSELS: Mild atherosclerosis changes of the aorta and its branches.  LYMPH NODES: No lymphadenopathy.  ABDOMINAL WALL: Within normal limits.  BONES: Mild degenerative changes of the spine. Scoliosis    CTAP:  MPRESSION:    1.  Mildly thickened rectal wall with surrounding inflammatory changes,   may reflect acute proctocolitis. Underlying mass within the rectum cannot   be excluded. Follow-up recommended.    --- End of Report ---      Lactate, Blood: 1.5 mmol/L (10-01 @ 15:16)

## 2024-10-01 NOTE — H&P ADULT - NS_MD_PANP_GEN_ALL_CORE
Attending and PA/NP shared services statement (NON-critical care): Gabapentin Counseling: I discussed with the patient the risks of gabapentin including but not limited to dizziness, somnolence, fatigue and ataxia.

## 2024-10-01 NOTE — H&P ADULT - NSHPPHYSICALEXAM_GEN_ALL_CORE
T(C): 37.2 (10-01-24 @ 19:45), Max: 38.3 (10-01-24 @ 15:09)  HR: 115 (10-01-24 @ 19:45) (85 - 115)  BP: 106/71 (10-01-24 @ 19:45) (106/71 - 119/76)  RR: 17 (10-01-24 @ 19:45) (17 - 18)  SpO2: 97% (10-01-24 @ 19:45) (97% - 99%)    PHYSICAL EXAM:  GENERAL: laying in bed, appearing comfortable and in NAD  HEENT: Moist mucous membranes  NECK: Supple  CHEST/LUNG: even respirations b/l; no accessory muscle use  ABDOMEN: Soft, Nontender, Nondistended  EXTREMITIES:   No calf TTP b/l  SKIN: warm  Neuro: A&Ox4 T(C): 37.2 (10-01-24 @ 19:45), Max: 38.3 (10-01-24 @ 15:09)  HR: 115 (10-01-24 @ 19:45) (85 - 115)  BP: 106/71 (10-01-24 @ 19:45) (106/71 - 119/76)  RR: 17 (10-01-24 @ 19:45) (17 - 18)  SpO2: 97% (10-01-24 @ 19:45) (97% - 99%)    PHYSICAL EXAM-  GENERAL: NAD,    HEAD:  Atraumatic, Normocephalic  EYES: EOMI, PERRLA, conjunctiva and sclera clear  NECK: Supple, No JVD, Normal thyroid  NERVOUS SYSTEM:  Alert & Oriented X3, Motor Strength 5/5 B/L upper and lower extremities; DTRs 2+ intact and symmetric  CHEST/LUNG: Clear to percussion bilaterally; No rales, rhonchi, wheezing, or rubs  HEART: Regular rate and rhythm; No murmurs, rubs, or gallops  ABDOMEN: Soft, Nontender, Nondistended; Bowel sounds present  EXTREMITIES:  2+ Peripheral Pulses, No clubbing, cyanosis, or edema  SKIN: No rashes or lesions T(C): 37.2 (10-01-24 @ 19:45), Max: 38.3 (10-01-24 @ 15:09)  HR: 115 (10-01-24 @ 19:45) (85 - 115)  BP: 106/71 (10-01-24 @ 19:45) (106/71 - 119/76)  RR: 17 (10-01-24 @ 19:45) (17 - 18)  SpO2: 97% (10-01-24 @ 19:45) (97% - 99%)    T(C): 37.2 (10-01-24 @ 20:13), Max: 38.3 (10-01-24 @ 15:09)  HR: 97 (10-01-24 @ 20:13) (85 - 115)  BP: 119/81 (10-01-24 @ 20:13) (106/71 - 119/81)  RR: 17 (10-01-24 @ 19:45) (17 - 18)  SpO2: 97% (10-01-24 @ 20:13) (97% - 99%)    PHYSICAL EXAM:  GENERAL: laying in bed, appearing comfortable and in NAD; non-septic appearing  HEENT: Moist mucous membranes  NECK: Supple  CHEST/LUNG: even respirations b/l; no accessory muscle use  ABDOMEN: Soft, Nontender, Nondistended  EXTREMITIES:   No calf TTP b/l  SKIN: warm  Neuro: A&Ox4

## 2024-10-01 NOTE — ED PROVIDER NOTE - ATTENDING APP SHARED VISIT CONTRIBUTION OF CARE
Patient complains of lower abdominal pain associated with constipation.  It increased after self disimpaction today.  She denies nausea, vomiting.  She reports fever at home.  She has a history of structural deformity of the urethra.  Vital signs noted.  No apparent distress.  Abdomen is nontender.  No CVA tenderness.  Note WBC count 16,000.  Await urine analysis and CT interpretation.  IV fluid, IV antibiotics ordered.

## 2024-10-01 NOTE — H&P ADULT - HISTORY OF PRESENT ILLNESS
60 y/o F PMHx HTN, HLD, asthma,  rectal pain , constipation, chills,  rectal bleeding after attempting disimpaction, urinary frequency  and pressure, fever and chills today 60 y/o F PMHx  DM2, HLD, and reactive airway disease presenting with lower abdm pain with rectal pain and constipation.  + nausea, denies any Diarrhea, or vomiting.  fever noticed today.  symptoms has been going on for the past several days.  Rectal bleeding after attempting disimpaction, urinary frequency  and pressure, fever and chills today    Offers no other complaints     Patient is on mounjaro 60 y/o F PMHx  DM2, HLD, IPMN, splenomegaly, and reactive airway disease c/o 1 day h/o severe rectal pain & fever and nausea and 3 day h/o constipation. Pt reports she woke up feeling severe rectal pain and pelvic pressure, w/ feeling like she needed to pass BM, but unable to do despite trying for 3 hours. She noted she attempted disimpaction, but made pain worse, stating she also noted a few drops of bright red blood at the time, but attributed to her h/o hemorrhoids Pt reports 101 temp ~1:30 today, prompting ED visit    Pt denies vomiting, unable to tolerate PO intake, h/o chronic constipation, bloody BMs/urine, cough        presenting with lower abdm pain with rectal pain and constipation.  + nausea, denies any Diarrhea, or vomiting.  fever noticed today.  symptoms has been going on for the past several days.  Rectal bleeding after attempting disimpaction, urinary frequency  and pressure, fever and chills today    Offers no other complaints     Patient is on mounjaro 60 y/o F PMHx  DM2, HLD, IPMN, splenomegaly, and reactive airway disease c/o 1 day h/o severe rectal pain, pain w/ urination, & fever and nausea and 3 day h/o constipation. Pt reports she woke up feeling severe rectal pain and pelvic pressure, w/ feeling like she needed to pass BM, but unable to do despite trying for 3 hours. She noted she attempted disimpaction, but made pain worse, stating she also noted a few drops of bright red blood at the time, but attributed to her h/o hemorrhoids Pt reports 101 temp ~1:30 today, prompting ED visit    Pt denies vomiting, unable to tolerate PO intake, h/o chronic constipation, bloody BMs/urine, cough

## 2024-10-01 NOTE — ED ADULT NURSE NOTE - NSFALLUNIVINTERV_ED_ALL_ED
Bed/Stretcher in lowest position, wheels locked, appropriate side rails in place/Call bell, personal items and telephone in reach/Instruct patient to call for assistance before getting out of bed/chair/stretcher/Non-slip footwear applied when patient is off stretcher/[place] to call system/Physically safe environment - no spills, clutter or unnecessary equipment/Purposeful proactive rounding/Room/bathroom lighting operational, light cord in reach

## 2024-10-02 LAB
A1C WITH ESTIMATED AVERAGE GLUCOSE RESULT: 5.9 % — HIGH (ref 4–5.6)
ANION GAP SERPL CALC-SCNC: 7 MMOL/L — SIGNIFICANT CHANGE UP (ref 7–14)
BUN SERPL-MCNC: 13 MG/DL — SIGNIFICANT CHANGE UP (ref 10–20)
CALCIUM SERPL-MCNC: 8.5 MG/DL — SIGNIFICANT CHANGE UP (ref 8.4–10.5)
CHLORIDE SERPL-SCNC: 103 MMOL/L — SIGNIFICANT CHANGE UP (ref 98–110)
CO2 SERPL-SCNC: 30 MMOL/L — SIGNIFICANT CHANGE UP (ref 17–32)
CREAT SERPL-MCNC: 0.9 MG/DL — SIGNIFICANT CHANGE UP (ref 0.7–1.5)
EGFR: 74 ML/MIN/1.73M2 — SIGNIFICANT CHANGE UP
ESTIMATED AVERAGE GLUCOSE: 123 MG/DL — HIGH (ref 68–114)
GLUCOSE BLDC GLUCOMTR-MCNC: 119 MG/DL — HIGH (ref 70–99)
GLUCOSE BLDC GLUCOMTR-MCNC: 84 MG/DL — SIGNIFICANT CHANGE UP (ref 70–99)
GLUCOSE BLDC GLUCOMTR-MCNC: 95 MG/DL — SIGNIFICANT CHANGE UP (ref 70–99)
GLUCOSE BLDC GLUCOMTR-MCNC: 95 MG/DL — SIGNIFICANT CHANGE UP (ref 70–99)
GLUCOSE SERPL-MCNC: 83 MG/DL — SIGNIFICANT CHANGE UP (ref 70–99)
HCT VFR BLD CALC: 35.6 % — LOW (ref 37–47)
HGB BLD-MCNC: 12.3 G/DL — SIGNIFICANT CHANGE UP (ref 12–16)
MCHC RBC-ENTMCNC: 31.5 PG — HIGH (ref 27–31)
MCHC RBC-ENTMCNC: 34.6 G/DL — SIGNIFICANT CHANGE UP (ref 32–37)
MCV RBC AUTO: 91 FL — SIGNIFICANT CHANGE UP (ref 81–99)
NRBC # BLD: 0 /100 WBCS — SIGNIFICANT CHANGE UP (ref 0–0)
PLATELET # BLD AUTO: 194 K/UL — SIGNIFICANT CHANGE UP (ref 130–400)
PMV BLD: 11.6 FL — HIGH (ref 7.4–10.4)
POTASSIUM SERPL-MCNC: 3.8 MMOL/L — SIGNIFICANT CHANGE UP (ref 3.5–5)
POTASSIUM SERPL-SCNC: 3.8 MMOL/L — SIGNIFICANT CHANGE UP (ref 3.5–5)
RBC # BLD: 3.91 M/UL — LOW (ref 4.2–5.4)
RBC # FLD: 12 % — SIGNIFICANT CHANGE UP (ref 11.5–14.5)
SODIUM SERPL-SCNC: 140 MMOL/L — SIGNIFICANT CHANGE UP (ref 135–146)
WBC # BLD: 7.44 K/UL — SIGNIFICANT CHANGE UP (ref 4.8–10.8)
WBC # FLD AUTO: 7.44 K/UL — SIGNIFICANT CHANGE UP (ref 4.8–10.8)

## 2024-10-02 PROCEDURE — 99232 SBSQ HOSP IP/OBS MODERATE 35: CPT

## 2024-10-02 PROCEDURE — 99223 1ST HOSP IP/OBS HIGH 75: CPT

## 2024-10-02 RX ORDER — FLUTICASONE PROPION/SALMETEROL 100-50 MCG
1 BLISTER, WITH INHALATION DEVICE INHALATION
Refills: 0 | Status: DISCONTINUED | OUTPATIENT
Start: 2024-10-02 | End: 2024-10-04

## 2024-10-02 RX ORDER — SODIUM CHLORIDE IRRIG SOLUTION 0.9 %
1000 SOLUTION, IRRIGATION IRRIGATION
Refills: 0 | Status: DISCONTINUED | OUTPATIENT
Start: 2024-10-02 | End: 2024-10-04

## 2024-10-02 RX ORDER — ALPRAZOLAM 0.5 MG/1
0.5 TABLET ORAL
Refills: 0 | Status: DISCONTINUED | OUTPATIENT
Start: 2024-10-02 | End: 2024-10-04

## 2024-10-02 RX ORDER — FAMOTIDINE 40 MG
20 TABLET ORAL ONCE
Refills: 0 | Status: COMPLETED | OUTPATIENT
Start: 2024-10-02 | End: 2024-10-02

## 2024-10-02 RX ORDER — ALPRAZOLAM 0.5 MG/1
0 TABLET ORAL
Qty: 0 | Refills: 0 | DISCHARGE

## 2024-10-02 RX ORDER — 5-HYDROXYTRYPTOPHAN (5-HTP) 100 MG
3 TABLET,DISINTEGRATING ORAL AT BEDTIME
Refills: 0 | Status: DISCONTINUED | OUTPATIENT
Start: 2024-10-02 | End: 2024-10-04

## 2024-10-02 RX ORDER — METHYLPREDNISOLONE ACETATE 80 MG/ML
40 INJECTION, SUSPENSION INTRA-ARTICULAR; INTRALESIONAL; INTRAMUSCULAR; SOFT TISSUE ONCE
Refills: 0 | Status: COMPLETED | OUTPATIENT
Start: 2024-10-02 | End: 2024-10-02

## 2024-10-02 RX ORDER — DIPHENHYDRAMINE HCL 12.5MG/5ML
50 LIQUID (ML) ORAL EVERY 4 HOURS
Refills: 0 | Status: DISCONTINUED | OUTPATIENT
Start: 2024-10-02 | End: 2024-10-03

## 2024-10-02 RX ADMIN — ALPRAZOLAM 0.5 MILLIGRAM(S): 0.5 TABLET ORAL at 01:28

## 2024-10-02 RX ADMIN — Medication 650 MILLIGRAM(S): at 01:48

## 2024-10-02 RX ADMIN — Medication 100 MILLIGRAM(S): at 06:24

## 2024-10-02 RX ADMIN — Medication 100 MILLIGRAM(S): at 17:42

## 2024-10-02 RX ADMIN — Medication 100 MILLIGRAM(S): at 17:43

## 2024-10-02 RX ADMIN — Medication 50 MILLIGRAM(S): at 22:29

## 2024-10-02 RX ADMIN — Medication 20 MILLIGRAM(S): at 22:35

## 2024-10-02 RX ADMIN — METHYLPREDNISOLONE ACETATE 40 MILLIGRAM(S): 80 INJECTION, SUSPENSION INTRA-ARTICULAR; INTRALESIONAL; INTRAMUSCULAR; SOFT TISSUE at 22:29

## 2024-10-02 RX ADMIN — HYDROMORPHONE HYDROCHLORIDE 0.5 MILLIGRAM(S): 1 INJECTION, SOLUTION INTRAMUSCULAR; INTRAVENOUS; SUBCUTANEOUS at 11:31

## 2024-10-02 RX ADMIN — Medication 2 TABLET(S): at 21:09

## 2024-10-02 RX ADMIN — Medication 650 MILLIGRAM(S): at 01:08

## 2024-10-02 RX ADMIN — HYDROMORPHONE HYDROCHLORIDE 0.5 MILLIGRAM(S): 1 INJECTION, SOLUTION INTRAMUSCULAR; INTRAVENOUS; SUBCUTANEOUS at 10:52

## 2024-10-02 RX ADMIN — Medication 75 MILLILITER(S): at 10:50

## 2024-10-02 NOTE — CHART NOTE - NSCHARTNOTEFT_GEN_A_CORE
CC.  Rash  HPI.  Patient reports rash on the stomach, and back which is itchy.  denies any SOB, CP, palpation, Sore throat, wheezing, throat swelling  rash started after taking rocephin, and flagyl combined  offers no other complaints               Constitutional: No fever, fatigue or weight loss.  Skin: No rash.  Eyes: No recent vision problems or eye pain.  ENT: No congestion, ear pain, or sore throat.  Endocrine: No thyroid problems.  Cardiovascular: No chest pain or palpation.  Respiratory: No cough, shortness of breath, congestion, or wheezing.  Gastrointestinal: No abdominal pain, nausea, vomiting, or diarrhea.  Genitourinary: No dysuria.  Musculoskeletal: No joint swelling.  Neurologic: No headache.      Vital Signs Last 24 Hrs  T(C): 37.9 (10-02-24 @ 21:00), Max: 37.9 (10-02-24 @ 21:00)  T(F): 100.2 (10-02-24 @ 21:00), Max: 100.2 (10-02-24 @ 21:00)  HR: 90 (10-02-24 @ 21:00) (76 - 90)  BP: 120/76 (10-02-24 @ 21:00) (92/58 - 120/76)  BP(mean): --  RR: 18 (10-02-24 @ 13:00) (18 - 18)  SpO2: 98% (10-02-24 @ 21:00) (97% - 98%)        PHYSICAL EXAM-  GENERAL: NAD, well-groomed, well-developed  HEAD:  Atraumatic, Normocephalic  EYES: EOMI, PERRLA, conjunctiva and sclera clear  NECK: Supple, No JVD, Normal thyroid  NERVOUS SYSTEM:  Alert & Oriented X3, Motor Strength 5/5 B/L upper and lower extremities; DTRs 2+ intact and symmetric  CHEST/LUNG: Clear to percussion bilaterally; No rales, rhonchi, wheezing, or rubs  HEART: Regular rate and rhythm; No murmurs, rubs, or gallops  ABDOMEN: Soft, Nontender, Nondistended; Bowel sounds present  EXTREMITIES:  2+ Peripheral Pulses, No clubbing, cyanosis, or edema  SKIN:  maculopapular like rash on the stomach, and back only                                  12.3   7.44  )-----------( 194      ( 02 Oct 2024 05:47 )             35.6     10-02    140  |  103  |  13  ----------------------------<  83  3.8   |  30  |  0.9    Ca    8.5      02 Oct 2024 05:47  Mg     1.9     10-01    TPro  6.9  /  Alb  4.5  /  TBili  0.6  /  DBili  x   /  AST  29  /  ALT  39  /  AlkPhos  84  10-01          Urinalysis Basic - ( 02 Oct 2024 05:47 )    Color: x / Appearance: x / SG: x / pH: x  Gluc: 83 mg/dL / Ketone: x  / Bili: x / Urobili: x   Blood: x / Protein: x / Nitrite: x   Leuk Esterase: x / RBC: x / WBC x   Sq Epi: x / Non Sq Epi: x / Bacteria: x              MEDICATIONS  (STANDING):  dextrose 5%. 1000 milliLiter(s) (50 mL/Hr) IV Continuous <Continuous>  dextrose 5%. 1000 milliLiter(s) (100 mL/Hr) IV Continuous <Continuous>  dextrose 50% Injectable 12.5 Gram(s) IV Push once  dextrose 50% Injectable 25 Gram(s) IV Push once  dextrose 50% Injectable 25 Gram(s) IV Push once  famotidine Injectable 20 milliGRAM(s) IV Push once  fluticasone propionate/ salmeterol 100-50 MICROgram(s) Diskus 1 Dose(s) Inhalation two times a day  glucagon  Injectable 1 milliGRAM(s) IntraMuscular once  insulin lispro (ADMELOG) corrective regimen sliding scale   SubCutaneous three times a day before meals  insulin lispro (ADMELOG) corrective regimen sliding scale   SubCutaneous at bedtime  lactated ringers. 1000 milliLiter(s) (75 mL/Hr) IV Continuous <Continuous>  nebivolol 5 milliGRAM(s) Oral daily  senna 2 Tablet(s) Oral at bedtime    MEDICATIONS  (PRN):  acetaminophen     Tablet .. 650 milliGRAM(s) Oral every 6 hours PRN Temp greater or equal to 38C (100.4F), Mild Pain (1 - 3)  ALPRAZolam 0.5 milliGRAM(s) Oral four times a day PRN anxiety  dextrose Oral Gel 15 Gram(s) Oral once PRN Blood Glucose LESS THAN 70 milliGRAM(s)/deciliter  diphenhydrAMINE Injectable 50 milliGRAM(s) IntraMuscular every 4 hours PRN Rash and/or Itching  HYDROmorphone  Injectable 0.5 milliGRAM(s) IV Push every 8 hours PRN Severe Pain (7 - 10)  levalbuterol Inhalation 1.25 milliGRAM(s) Inhalation every 6 hours PRN SOB and Wheezing  melatonin 3 milliGRAM(s) Oral at bedtime PRN Insomnia  ondansetron Injectable 4 milliGRAM(s) IV Push every 8 hours PRN Nausea and/or Vomiting  polyethylene glycol 3350 17 Gram(s) Oral two times a day PRN Constipation    A/P rash seems to be secondary to allergic reaction to medications.  Possibly flagyl, and rocephin.  will stop medications.  IV bendaryl, steriod, and pepcid.  Monitor for now.    Multiple drug allergy  ID consult

## 2024-10-02 NOTE — PROGRESS NOTE ADULT - ASSESSMENT
58 y/o F PMHx  DM2, HLD, IPMN, splenomegaly, and reactive airway disease c/o 1 day h/o severe rectal pain, pain w/ urination, & fever and nausea and 3 day h/o constipation. Pt reports she woke up feeling severe rectal pain and pelvic pressure, w/ feeling like she needed to pass BM, but unable to do despite trying for 3 hours. She noted she attempted disimpaction, but made pain worse, stating she also noted a few drops of bright red blood at the time, but attributed to her h/o hemorrhoids Pt reports 101 temp ~1:30 today, prompting ED visit          # Sepsis ( 101 temp, tachy, and elevated WBC in ED)  #Proctocolitis  #H/O IPMN  CTAP:   Mildly thickened rectal wall with surrounding inflammatory changes, may reflect acute proctocolitis. Underlying mass within the rectum cannot be excluded. Follow-up recommended.  s/p CTX and flagyl in ED w/ 1 L fluid  - c/w CTX and flagyl  - GI consult ( f/u with Dr. Dash Alan outpt)  - F/U Blood and urine cx  - bowel regimen   - Advance to full liquid diet  - trend WBC and monitor fever curve  - trend H+H    # Asthma   - c/w Xopenex prn ( non-formulary completed w/ only nebulizer version available)    #DM  -Patient is on mounjaro, hold medication for now   - diet controlled at home  - start ISS and monitor FSS    #HTN  - diet controlled   - monitor BPs    # H/O PVCs/sinus tach  - c/w nebivolol (  non-formulary completed)      #Anxiety  - c/w alprazolam prn    #VANDANA  -Continue with cpap      VTE ppx: SCDs    #Progress Note Handoff  Pending (specify):  as above   Family discussion:  plan of care was discussed with patient and family in details.  all questions were answered.  seems to understand, and in agreement  Disposition:  Home

## 2024-10-02 NOTE — PROGRESS NOTE ADULT - SUBJECTIVE AND OBJECTIVE BOX
58 y/o F PMHx  DM2, HLD, IPMN, splenomegaly, and reactive airway disease c/o 1 day h/o severe rectal pain, pain w/ urination, & fever and nausea and 3 day h/o constipation.    Today:  Seen at bedside, no new complaints.          REVIEW OF SYSTEMS:  Rectal pain      MEDICATIONS  (STANDING):  cefTRIAXone   IVPB 1000 milliGRAM(s) IV Intermittent every 24 hours  dextrose 5%. 1000 milliLiter(s) (50 mL/Hr) IV Continuous <Continuous>  dextrose 5%. 1000 milliLiter(s) (100 mL/Hr) IV Continuous <Continuous>  dextrose 50% Injectable 12.5 Gram(s) IV Push once  dextrose 50% Injectable 25 Gram(s) IV Push once  dextrose 50% Injectable 25 Gram(s) IV Push once  fluticasone propionate/ salmeterol 100-50 MICROgram(s) Diskus 1 Dose(s) Inhalation two times a day  glucagon  Injectable 1 milliGRAM(s) IntraMuscular once  insulin lispro (ADMELOG) corrective regimen sliding scale   SubCutaneous three times a day before meals  insulin lispro (ADMELOG) corrective regimen sliding scale   SubCutaneous at bedtime  lactated ringers. 1000 milliLiter(s) (75 mL/Hr) IV Continuous <Continuous>  metroNIDAZOLE  IVPB 500 milliGRAM(s) IV Intermittent every 12 hours  nebivolol 5 milliGRAM(s) Oral daily  senna 2 Tablet(s) Oral at bedtime    MEDICATIONS  (PRN):  acetaminophen     Tablet .. 650 milliGRAM(s) Oral every 6 hours PRN Temp greater or equal to 38C (100.4F), Mild Pain (1 - 3)  ALPRAZolam 0.5 milliGRAM(s) Oral four times a day PRN anxiety  dextrose Oral Gel 15 Gram(s) Oral once PRN Blood Glucose LESS THAN 70 milliGRAM(s)/deciliter  HYDROmorphone  Injectable 0.5 milliGRAM(s) IV Push every 8 hours PRN Severe Pain (7 - 10)  levalbuterol Inhalation 1.25 milliGRAM(s) Inhalation every 6 hours PRN SOB and Wheezing  melatonin 3 milliGRAM(s) Oral at bedtime PRN Insomnia  ondansetron Injectable 4 milliGRAM(s) IV Push every 8 hours PRN Nausea and/or Vomiting  polyethylene glycol 3350 17 Gram(s) Oral two times a day PRN Constipation      Allergies  Augmentin (Stomach Upset)  latex (Rash)  sulfa drugs (Angioedema)  Sulfate (Unknown)      Vital Signs Last 24 Hrs  T(C): 37.1 (02 Oct 2024 13:00), Max: 37.2 (01 Oct 2024 19:45)  T(F): 98.7 (02 Oct 2024 13:00), Max: 99 (01 Oct 2024 19:45)  HR: 76 (02 Oct 2024 13:00) (76 - 115)  BP: 119/73 (02 Oct 2024 13:00) (92/58 - 119/81)  RR: 18 (02 Oct 2024 13:00) (17 - 18)  SpO2: 97% (02 Oct 2024 04:30) (97% - 97%)    Parameters below as of 02 Oct 2024 04:30  Patient On (Oxygen Delivery Method): room air        PHYSICAL EXAM:  GENERAL: laying in bed, appearing comfortable and in NAD; non-septic appearing  HEENT: Moist mucous membranes  NECK: Supple  CHEST/LUNG: even respirations b/l; no accessory muscle use  ABDOMEN: Soft, Nontender, Nondistended  EXTREMITIES:   No calf TTP b/l  SKIN: warm  Neuro: A&Ox4      LABS:                        12.3   7.44  )-----------( 194      ( 02 Oct 2024 05:47 )             35.6     10-02    140  |  103  |  13  ----------------------------<  83  3.8   |  30  |  0.9    Ca    8.5      02 Oct 2024 05:47  Mg     1.9     10-01    TPro  6.9  /  Alb  4.5  /  TBili  0.6  /  DBili  x   /  AST  29  /  ALT  39  /  AlkPhos  84  10-01      Urinalysis Basic - ( 02 Oct 2024 05:47 )    Color: x / Appearance: x / SG: x / pH: x  Gluc: 83 mg/dL / Ketone: x  / Bili: x / Urobili: x   Blood: x / Protein: x / Nitrite: x   Leuk Esterase: x / RBC: x / WBC x   Sq Epi: x / Non Sq Epi: x / Bacteria: x

## 2024-10-02 NOTE — CONSULT NOTE ADULT - SUBJECTIVE AND OBJECTIVE BOX
Chief complaint/Reason for consult:    HPI:  60 y/o F PMHx  DM2, HLD, IPMN, splenomegaly, and reactive airway disease c/o 1 day h/o severe rectal pain, pain w/ urination, & fever and nausea and 3 day h/o constipation. Pt reports she woke up feeling severe rectal pain and pelvic pressure, w/ feeling like she needed to pass BM, but unable to do despite trying for 3 hours. She noted she attempted disimpaction, but made pain worse, stating she also noted a few drops of bright red blood at the time, but attributed to her h/o hemorrhoids Pt reports 101 temp ~1:30 today, prompting ED visit    Pt denies vomiting, unable to tolerate PO intake, h/o chronic constipation, bloody BMs/urine, cough       (01 Oct 2024 19:51)    59yFemale      PAST MEDICAL & SURGICAL HISTORY:      Family history:  FAMILY HISTORY:    No GI cancers in first or second degree relatives    Social History: No smoking. No alcohol. No illegal drug use.    Allergies:        MEDICATIONS:        MEDICATIONS  (STANDING):  cefTRIAXone   IVPB 1000 milliGRAM(s) IV Intermittent every 24 hours  dextrose 5%. 1000 milliLiter(s) (50 mL/Hr) IV Continuous <Continuous>  dextrose 5%. 1000 milliLiter(s) (100 mL/Hr) IV Continuous <Continuous>  dextrose 50% Injectable 12.5 Gram(s) IV Push once  dextrose 50% Injectable 25 Gram(s) IV Push once  dextrose 50% Injectable 25 Gram(s) IV Push once  fluticasone propionate/ salmeterol 100-50 MICROgram(s) Diskus 1 Dose(s) Inhalation two times a day  glucagon  Injectable 1 milliGRAM(s) IntraMuscular once  insulin lispro (ADMELOG) corrective regimen sliding scale   SubCutaneous three times a day before meals  insulin lispro (ADMELOG) corrective regimen sliding scale   SubCutaneous at bedtime  lactated ringers. 1000 milliLiter(s) (75 mL/Hr) IV Continuous <Continuous>  metroNIDAZOLE  IVPB 500 milliGRAM(s) IV Intermittent every 12 hours  nebivolol 5 milliGRAM(s) Oral daily  senna 2 Tablet(s) Oral at bedtime    MEDICATIONS  (PRN):  acetaminophen     Tablet .. 650 milliGRAM(s) Oral every 6 hours PRN Temp greater or equal to 38C (100.4F), Mild Pain (1 - 3)  ALPRAZolam 0.5 milliGRAM(s) Oral four times a day PRN anxiety  dextrose Oral Gel 15 Gram(s) Oral once PRN Blood Glucose LESS THAN 70 milliGRAM(s)/deciliter  HYDROmorphone  Injectable 0.5 milliGRAM(s) IV Push every 8 hours PRN Severe Pain (7 - 10)  levalbuterol Inhalation 1.25 milliGRAM(s) Inhalation every 6 hours PRN SOB and Wheezing  melatonin 3 milliGRAM(s) Oral at bedtime PRN Insomnia  ondansetron Injectable 4 milliGRAM(s) IV Push every 8 hours PRN Nausea and/or Vomiting  polyethylene glycol 3350 17 Gram(s) Oral two times a day PRN Constipation        REVIEW OF SYSTEMS  General:  No weight loss, fevers, or chills.  Eyes:  No reported pain or visual changes  ENT:  No sore throat or runny nose.  NECK: No stiffness or lymphadenopathy  CV:  No chest pain or palpitations.  Resp:  No shortness of breath, cough, wheezing or hemoptysis  GI:  No abdominal pain, nausea, vomiting, dysphagia, diarrhea or constipation. No rectal bleeding, melena, or hematemesis.  Muscle:  No aches or weakness  Neuro:  No tingling, numbness       VITALS:   T(F): 98 (10-02-24 @ 04:30), Max: 101 (10-01-24 @ 15:09)  HR: 77 (10-02-24 @ 06:25) (77 - 115)  BP: 104/69 (10-02-24 @ 06:25) (92/58 - 119/81)  RR: 18 (10-02-24 @ 04:30) (17 - 18)  SpO2: 97% (10-02-24 @ 04:30) (97% - 99%)    PHYSICAL EXAM:  GENERAL: AAOx3, no acute distress.  HEAD:  Atraumatic, Normocephalic  EYES: conjunctiva and sclera clear  NECK: Supple, No thyromegaly   CHEST/LUNG: Clear to auscultation bilaterally; No wheeze, rhonchi, or rales  HEART: Regular rate and rhythm; normal S1, S2, No murmurs.  ABDOMEN: Soft, nontender, nondistended; Bowel sounds present  NEUROLOGY: No asterixis or tremor  SKIN: Intact, no jaundice          LABS:  10-02    140  |  103  |  13  ----------------------------<  83  3.8   |  30  |  0.9    Ca    8.5      02 Oct 2024 05:47  Mg     1.9     10-01    TPro  6.9  /  Alb  4.5  /  TBili  0.6  /  DBili  x   /  AST  29  /  ALT  39  /  AlkPhos  84  10-01                          12.3   7.44  )-----------( 194      ( 02 Oct 2024 05:47 )             35.6     LIVER FUNCTIONS - ( 01 Oct 2024 15:16 )  Alb: 4.5 g/dL / Pro: 6.9 g/dL / ALK PHOS: 84 U/L / ALT: 39 U/L / AST: 29 U/L / GGT: x               IMAGING:    < from: CT Abdomen and Pelvis w/ IV Cont (10.01.24 @ 16:56) >    ACC: [id number] EXAM:  CT ABDOMEN AND PELVIS IC   ORDERED BY: SAMM WAGNER     PROCEDURE DATE:  10/01/2024          INTERPRETATION:  CLINICAL INFORMATION: Lower abdominal pain,   constipation, fever, rectal bleeding.    COMPARISON: None.    CONTRAST:  IV Contrast: Omnipaque 350  90 cc administered   10 cc discarded  Oral Contrast: NONE    PROCEDURE:  CT of the Abdomen and Pelvis was performed.  Sagittal and coronal reformats were performed.    FINDINGS:  LOWER CHEST: Bibasilar segmental atelectasis.    LIVER: Diffuse hepatic steatosis. Subcentimeter hypodensity in the right   hepatic lobe, too small to further characterize.  BILE DUCTS: Normal caliber.  GALLBLADDER: Within normal limits.  SPLEEN: Within normal limits.  PANCREAS: Within normal limits.  ADRENALS: Within normal limits.  KIDNEYS/URETERS: Symmetric renal enhancement. No hydronephrosis.    BLADDER: Within normal limits.  REPRODUCTIVE ORGANS: Status post hysterectomy.    BOWEL: Mildly thickened rectal wall with surrounding inflammatory changes   and fat stranding. No bowel obstruction or pneumoperitoneum. Colonic   diverticulosis without evidence of acute diverticulitis. Appendix is   normal.  PERITONEUM/RETROPERITONEUM: Within normal limits.  VESSELS: Mild atherosclerosis changes of the aorta and its branches.  LYMPH NODES: No lymphadenopathy.  ABDOMINAL WALL: Within normal limits.  BONES: Mild degenerative changes of the spine. Scoliosis    IMPRESSION:    1.  Mildly thickened rectal wall with surrounding inflammatory changes,   may reflect acute proctocolitis. Underlying mass within the rectum cannot   be excluded. Follow-up recommended.    --- End of Report ---          DI STONER MD; Resident Radiologist  This document has been electronically signed.  IZABEL SOLIS MD; Attending Radiologist  This document has been electronically signed. Oct  1 2024  6:47PM    < end of copied text >       Chief complaint/Reason for consult: abnormal CT scan    HPI:  58 y/o F PMHx  DM2, HLD, IPMN, splenomegaly, and reactive airway disease c/o 1 day h/o severe rectal pain, pain w/ urination, & fever and nausea and 3 day h/o constipation. Pt reports she woke up feeling severe rectal pain and pelvic pressure, w/ feeling like she needed to pass BM, but unable to do despite trying for 3 hours. She noted she attempted disimpaction, but made pain worse, stating she also noted a few drops of bright red blood at the time, but attributed to her h/o hemorrhoids Pt reports 101 temp ~1:30 today, prompting ED visit    Pt denies vomiting, unable to tolerate PO intake, h/o chronic constipation, bloody BMs/urine, cough       (01 Oct 2024 19:51)    GI updates: 59yFemale pmh DM on Mounjaro last dose last week, HLD, IPMN, splenomegaly, recent COVID-19 presents for rectal pain after 3 days of constipation, patient tried to manually disimpact herself and developed severe pain and noticed a few drops of blood. Patient with history of Colonoscopy as per patient that showed internal hemorrhoids. Currently Patient denies nausea, vomiting, hematemesis, melena, blood in stool, diarrhea, constipation, abdominal pain. +rectal pain      PAST MEDICAL & SURGICAL HISTORY:   HTN (hypertension)      HLD (hyperlipidemia)      Fatty liver      Asthma      S/P abdominoplasty      S/P bilateral breast reduction      Family history:  FAMILY HISTORY:    No GI cancers in first or second degree relatives    Social History: No smoking. No alcohol. No illegal drug use.    Allergies:   Augmentin (Stomach Upset)  latex (Rash)  sulfa drugs (Angioedema)  Sulfate (Unknown)  Intolerances      MEDICATIONS: Home Medications:  LEVALBUTEROL TAR HFA 45MCG INH:  (01 Oct 2024 23:46)  MOUNJARO 5MG/0.5ML INJ (4 PENS): INJECT 5 MG UNDER THE SKIN ONCE A WEEK (01 Oct 2024 20:55)  nebivolol 5 mg oral tablet: 1 tab(s) orally once a day (01 Oct 2024 23:45)  Symbicort 80 mcg-4.5 mcg/inh inhalation aerosol: 2 puff(s) inhaled 2 times a day (01 Oct 2024 23:46)    MEDICATIONS  (STANDING):  cefTRIAXone   IVPB 1000 milliGRAM(s) IV Intermittent every 24 hours  dextrose 5%. 1000 milliLiter(s) (50 mL/Hr) IV Continuous <Continuous>  dextrose 5%. 1000 milliLiter(s) (100 mL/Hr) IV Continuous <Continuous>  dextrose 50% Injectable 12.5 Gram(s) IV Push once  dextrose 50% Injectable 25 Gram(s) IV Push once  dextrose 50% Injectable 25 Gram(s) IV Push once  fluticasone propionate/ salmeterol 100-50 MICROgram(s) Diskus 1 Dose(s) Inhalation two times a day  glucagon  Injectable 1 milliGRAM(s) IntraMuscular once  insulin lispro (ADMELOG) corrective regimen sliding scale   SubCutaneous three times a day before meals  insulin lispro (ADMELOG) corrective regimen sliding scale   SubCutaneous at bedtime  lactated ringers. 1000 milliLiter(s) (75 mL/Hr) IV Continuous <Continuous>  metroNIDAZOLE  IVPB 500 milliGRAM(s) IV Intermittent every 12 hours  nebivolol 5 milliGRAM(s) Oral daily  senna 2 Tablet(s) Oral at bedtime    MEDICATIONS  (PRN):  acetaminophen     Tablet .. 650 milliGRAM(s) Oral every 6 hours PRN Temp greater or equal to 38C (100.4F), Mild Pain (1 - 3)  ALPRAZolam 0.5 milliGRAM(s) Oral four times a day PRN anxiety  dextrose Oral Gel 15 Gram(s) Oral once PRN Blood Glucose LESS THAN 70 milliGRAM(s)/deciliter  HYDROmorphone  Injectable 0.5 milliGRAM(s) IV Push every 8 hours PRN Severe Pain (7 - 10)  levalbuterol Inhalation 1.25 milliGRAM(s) Inhalation every 6 hours PRN SOB and Wheezing  melatonin 3 milliGRAM(s) Oral at bedtime PRN Insomnia  ondansetron Injectable 4 milliGRAM(s) IV Push every 8 hours PRN Nausea and/or Vomiting  polyethylene glycol 3350 17 Gram(s) Oral two times a day PRN Constipation        REVIEW OF SYSTEMS  General:  + weight loss, no fevers, or chills.  Eyes:  No reported pain or visual changes  ENT:  No sore throat or runny nose.  NECK: No stiffness or lymphadenopathy  CV:  No chest pain or palpitations.  Resp:  No shortness of breath, cough, wheezing or hemoptysis  GI:  No abdominal pain, nausea, vomiting, dysphagia, diarrhea or constipation. No rectal bleeding, melena, or hematemesis.  R  Neuro:  No tingling, numbness       VITALS:   T(F): 98 (10-02-24 @ 04:30), Max: 101 (10-01-24 @ 15:09)  HR: 77 (10-02-24 @ 06:25) (77 - 115)  BP: 104/69 (10-02-24 @ 06:25) (92/58 - 119/81)  RR: 18 (10-02-24 @ 04:30) (17 - 18)  SpO2: 97% (10-02-24 @ 04:30) (97% - 99%)    PHYSICAL EXAM:  GENERAL: AAOx3, no acute distress.  HEAD:  Atraumatic, Normocephalic  EYES: conjunctiva and sclera clear  NECK: Supple, No thyromegaly   CHEST/LUNG: Clear to auscultation bilaterally; No wheeze, rhonchi, or rales  HEART: Regular rate and rhythm; normal S1, S2, No murmurs.  ABDOMEN: Soft, nontender, nondistended; Bowel sounds present  NEUROLOGY: No asterixis or tremor  SKIN: Intact, no jaundice  Rectal exam-erythema noted in perianal area, no stool burden in finger's reach, no blood in stool noted, no mass felt        LABS:  10-02    140  |  103  |  13  ----------------------------<  83  3.8   |  30  |  0.9    Ca    8.5      02 Oct 2024 05:47  Mg     1.9     10-01    TPro  6.9  /  Alb  4.5  /  TBili  0.6  /  DBili  x   /  AST  29  /  ALT  39  /  AlkPhos  84  10-01                          12.3   7.44  )-----------( 194      ( 02 Oct 2024 05:47 )             35.6     LIVER FUNCTIONS - ( 01 Oct 2024 15:16 )  Alb: 4.5 g/dL / Pro: 6.9 g/dL / ALK PHOS: 84 U/L / ALT: 39 U/L / AST: 29 U/L / GGT: x               IMAGING:    < from: CT Abdomen and Pelvis w/ IV Cont (10.01.24 @ 16:56) >    ACC: [id number] EXAM:  CT ABDOMEN AND PELVIS IC   ORDERED BY: SAMM WAGNER     PROCEDURE DATE:  10/01/2024          INTERPRETATION:  CLINICAL INFORMATION: Lower abdominal pain,   constipation, fever, rectal bleeding.    COMPARISON: None.    CONTRAST:  IV Contrast: Omnipaque 350  90 cc administered   10 cc discarded  Oral Contrast: NONE    PROCEDURE:  CT of the Abdomen and Pelvis was performed.  Sagittal and coronal reformats were performed.    FINDINGS:  LOWER CHEST: Bibasilar segmental atelectasis.    LIVER: Diffuse hepatic steatosis. Subcentimeter hypodensity in the right   hepatic lobe, too small to further characterize.  BILE DUCTS: Normal caliber.  GALLBLADDER: Within normal limits.  SPLEEN: Within normal limits.  PANCREAS: Within normal limits.  ADRENALS: Within normal limits.  KIDNEYS/URETERS: Symmetric renal enhancement. No hydronephrosis.    BLADDER: Within normal limits.  REPRODUCTIVE ORGANS: Status post hysterectomy.    BOWEL: Mildly thickened rectal wall with surrounding inflammatory changes   and fat stranding. No bowel obstruction or pneumoperitoneum. Colonic   diverticulosis without evidence of acute diverticulitis. Appendix is   normal.  PERITONEUM/RETROPERITONEUM: Within normal limits.  VESSELS: Mild atherosclerosis changes of the aorta and its branches.  LYMPH NODES: No lymphadenopathy.  ABDOMINAL WALL: Within normal limits.  BONES: Mild degenerative changes of the spine. Scoliosis    IMPRESSION:    1.  Mildly thickened rectal wall with surrounding inflammatory changes,   may reflect acute proctocolitis. Underlying mass within the rectum cannot   be excluded. Follow-up recommended.    --- End of Report ---          DI STONER MD; Resident Radiologist  This document has been electronically signed.  IZABEL SOLIS MD; Attending Radiologist  This document has been electronically signed. Oct  1 2024  6:47PM    < end of copied text >

## 2024-10-02 NOTE — CONSULT NOTE ADULT - ASSESSMENT
59yFemale pmh DM on Mounjaro last dose last week, HLD, IPMN, splenomegaly, recent COVID-19 presents for rectal pain after 3 days of constipation, patient tried to manually disimpact herself and developed severe pain and noticed a few drops of blood. Patient with history of Colonoscopy as per patient that showed internal hemorrhoids.    #Rectal pain, Constipation  #weight loss from mounjaro  #Mildly thickened rectal wall with surrounding inflammatory changes,   may reflect acute proctocolitis. Underlying mass within the rectum cannot   be excluded. Follow-up recommended.  10/2/24-erythema noted in perianal area, no stool burden in finger's reach, no blood in stool noted  Rec  -miralax BID, senna BID  -anusol cream  -patient to followup with Dr. Granado for outpatient Colonoscopy   - Follow up with our GI office located on [address] [place] [address], Phone number [phone] with Dr. Granado    #Diffuse hepatic steatosis  Rec  - patient seeing Dr. Alarcon, outpatient   - Follow up with our GI office located on [address] [place] [address], Phone number [phone] with Dr. Alarcon 59yFemale pmh DM on Mounjaro last dose last week, HLD, IPMN, splenomegaly, recent COVID-19 presents for rectal pain after 3 days of constipation, patient tried to manually disimpact herself and developed severe pain and noticed a few drops of blood. Patient with history of Colonoscopy as per patient that showed internal hemorrhoids.    #Rectal pain, Constipation  #weight loss from mounjaro  #Mildly thickened rectal wall with surrounding inflammatory changes,   may reflect acute proctocolitis. Underlying mass within the rectum cannot   be excluded. Follow-up recommended.  10/2/24-erythema noted in perianal area, no stool burden in finger's reach, no blood in stool noted  Rec  -miralax BID, senna BID  -anusol cream  -patient to followup with Dr. Granado for outpatient Colonoscopy, patient to bring Colonoscopy records from last year  - Follow up with our GI office located on [address] [place] [address], Phone number [phone] with Dr. Granado    #Diffuse hepatic steatosis  Rec  - patient seeing Dr. Alarcon, outpatient   - Follow up with our GI office located on [address] [place] [address], Phone number [phone] with Dr. Alarcon 59yFemale pmh DM on Mounjaro last dose last week, HLD, IPMN, splenomegaly, recent COVID-19 presents for rectal pain after 3 days of constipation, patient tried to manually disimpact herself and developed severe pain and noticed a few drops of blood. Patient with history of Colonoscopy as per patient that showed internal hemorrhoids.    #Rectal pain, Constipation  #weight loss from mounjaro  #Mildly thickened rectal wall with surrounding inflammatory changes,   may reflect acute proctocolitis. Underlying mass within the rectum cannot   be excluded. Follow-up recommended.  10/2/24-erythema noted in perianal area, no stool burden in finger's reach, no blood in stool noted  Rec  -miralax BID, senna BID  -anusol cream  -consider risks and benefits of mounjaro   -patient to followup with Dr. Granado for outpatient Colonoscopy, patient to bring Colonoscopy records from last year  - Follow up with our GI office located on [address] [place] [address], Phone number [phone] with Dr. Granado    #Diffuse hepatic steatosis  Rec  - patient seeing Dr. Alarcon, outpatient   - Follow up with our GI office located on [address] [place] [address], Phone number [phone] with Dr. Alarcon

## 2024-10-03 LAB
ALBUMIN SERPL ELPH-MCNC: 4.3 G/DL — SIGNIFICANT CHANGE UP (ref 3.5–5.2)
ALP SERPL-CCNC: 75 U/L — SIGNIFICANT CHANGE UP (ref 30–115)
ALT FLD-CCNC: 39 U/L — SIGNIFICANT CHANGE UP (ref 0–41)
ANION GAP SERPL CALC-SCNC: 12 MMOL/L — SIGNIFICANT CHANGE UP (ref 7–14)
AST SERPL-CCNC: 25 U/L — SIGNIFICANT CHANGE UP (ref 0–41)
BASOPHILS # BLD AUTO: 0.01 K/UL — SIGNIFICANT CHANGE UP (ref 0–0.2)
BASOPHILS NFR BLD AUTO: 0.2 % — SIGNIFICANT CHANGE UP (ref 0–1)
BILIRUB SERPL-MCNC: 0.3 MG/DL — SIGNIFICANT CHANGE UP (ref 0.2–1.2)
BUN SERPL-MCNC: 11 MG/DL — SIGNIFICANT CHANGE UP (ref 10–20)
CALCIUM SERPL-MCNC: 9.2 MG/DL — SIGNIFICANT CHANGE UP (ref 8.4–10.5)
CHLORIDE SERPL-SCNC: 104 MMOL/L — SIGNIFICANT CHANGE UP (ref 98–110)
CO2 SERPL-SCNC: 28 MMOL/L — SIGNIFICANT CHANGE UP (ref 17–32)
CREAT SERPL-MCNC: 0.8 MG/DL — SIGNIFICANT CHANGE UP (ref 0.7–1.5)
CULTURE RESULTS: SIGNIFICANT CHANGE UP
EGFR: 85 ML/MIN/1.73M2 — SIGNIFICANT CHANGE UP
EOSINOPHIL # BLD AUTO: 0 K/UL — SIGNIFICANT CHANGE UP (ref 0–0.7)
EOSINOPHIL NFR BLD AUTO: 0 % — SIGNIFICANT CHANGE UP (ref 0–8)
GLUCOSE BLDC GLUCOMTR-MCNC: 110 MG/DL — HIGH (ref 70–99)
GLUCOSE BLDC GLUCOMTR-MCNC: 115 MG/DL — HIGH (ref 70–99)
GLUCOSE BLDC GLUCOMTR-MCNC: 121 MG/DL — HIGH (ref 70–99)
GLUCOSE BLDC GLUCOMTR-MCNC: 157 MG/DL — HIGH (ref 70–99)
GLUCOSE SERPL-MCNC: 171 MG/DL — HIGH (ref 70–99)
HCT VFR BLD CALC: 38.6 % — SIGNIFICANT CHANGE UP (ref 37–47)
HGB BLD-MCNC: 13.4 G/DL — SIGNIFICANT CHANGE UP (ref 12–16)
IMM GRANULOCYTES NFR BLD AUTO: 0.2 % — SIGNIFICANT CHANGE UP (ref 0.1–0.3)
LYMPHOCYTES # BLD AUTO: 0.65 K/UL — LOW (ref 1.2–3.4)
LYMPHOCYTES # BLD AUTO: 14.2 % — LOW (ref 20.5–51.1)
MCHC RBC-ENTMCNC: 30.7 PG — SIGNIFICANT CHANGE UP (ref 27–31)
MCHC RBC-ENTMCNC: 34.7 G/DL — SIGNIFICANT CHANGE UP (ref 32–37)
MCV RBC AUTO: 88.3 FL — SIGNIFICANT CHANGE UP (ref 81–99)
MONOCYTES # BLD AUTO: 0.09 K/UL — LOW (ref 0.1–0.6)
MONOCYTES NFR BLD AUTO: 2 % — SIGNIFICANT CHANGE UP (ref 1.7–9.3)
NEUTROPHILS # BLD AUTO: 3.81 K/UL — SIGNIFICANT CHANGE UP (ref 1.4–6.5)
NEUTROPHILS NFR BLD AUTO: 83.4 % — HIGH (ref 42.2–75.2)
NRBC # BLD: 0 /100 WBCS — SIGNIFICANT CHANGE UP (ref 0–0)
PLATELET # BLD AUTO: 220 K/UL — SIGNIFICANT CHANGE UP (ref 130–400)
PMV BLD: 11.4 FL — HIGH (ref 7.4–10.4)
POTASSIUM SERPL-MCNC: 4.3 MMOL/L — SIGNIFICANT CHANGE UP (ref 3.5–5)
POTASSIUM SERPL-SCNC: 4.3 MMOL/L — SIGNIFICANT CHANGE UP (ref 3.5–5)
PROT SERPL-MCNC: 6.4 G/DL — SIGNIFICANT CHANGE UP (ref 6–8)
RBC # BLD: 4.37 M/UL — SIGNIFICANT CHANGE UP (ref 4.2–5.4)
RBC # FLD: 11.7 % — SIGNIFICANT CHANGE UP (ref 11.5–14.5)
SODIUM SERPL-SCNC: 144 MMOL/L — SIGNIFICANT CHANGE UP (ref 135–146)
SPECIMEN SOURCE: SIGNIFICANT CHANGE UP
WBC # BLD: 4.57 K/UL — LOW (ref 4.8–10.8)
WBC # FLD AUTO: 4.57 K/UL — LOW (ref 4.8–10.8)

## 2024-10-03 PROCEDURE — 99232 SBSQ HOSP IP/OBS MODERATE 35: CPT

## 2024-10-03 PROCEDURE — 99233 SBSQ HOSP IP/OBS HIGH 50: CPT

## 2024-10-03 RX ORDER — CEFTRIAXONE SODIUM 1 G
1000 VIAL (EA) INJECTION EVERY 24 HOURS
Refills: 0 | Status: DISCONTINUED | OUTPATIENT
Start: 2024-10-03 | End: 2024-10-04

## 2024-10-03 RX ORDER — CLINDAMYCIN PHOSPHATE 150 MG/ML
600 INJECTION, SOLUTION INTRAVENOUS EVERY 8 HOURS
Refills: 0 | Status: DISCONTINUED | OUTPATIENT
Start: 2024-10-03 | End: 2024-10-04

## 2024-10-03 RX ORDER — DIPHENHYDRAMINE HCL 12.5MG/5ML
25 LIQUID (ML) ORAL EVERY 6 HOURS
Refills: 0 | Status: DISCONTINUED | OUTPATIENT
Start: 2024-10-03 | End: 2024-10-04

## 2024-10-03 RX ORDER — CEFTRIAXONE SODIUM 1 G
1000 VIAL (EA) INJECTION EVERY 24 HOURS
Refills: 0 | Status: DISCONTINUED | OUTPATIENT
Start: 2024-10-03 | End: 2024-10-03

## 2024-10-03 RX ADMIN — Medication 17 GRAM(S): at 11:43

## 2024-10-03 RX ADMIN — Medication 25 MILLIGRAM(S): at 12:16

## 2024-10-03 RX ADMIN — Medication 100 MILLIGRAM(S): at 11:43

## 2024-10-03 RX ADMIN — Medication 100 MILLIGRAM(S): at 16:36

## 2024-10-03 RX ADMIN — CLINDAMYCIN PHOSPHATE 100 MILLIGRAM(S): 150 INJECTION, SOLUTION INTRAVENOUS at 22:24

## 2024-10-03 NOTE — PROGRESS NOTE ADULT - ASSESSMENT
60 y/o F PMHx  DM2, HLD, IPMN, splenomegaly, and reactive airway disease c/o 1 day h/o severe rectal pain, pain w/ urination, & fever and nausea and 3 day h/o constipation. Pt reports she woke up feeling severe rectal pain and pelvic pressure, w/ feeling like she needed to pass BM, but unable to do despite trying for 3 hours. She noted she attempted disimpaction, but made pain worse, stating she also noted a few drops of bright red blood at the time, but attributed to her h/o hemorrhoids Pt reports 101 temp ~1:30 today, prompting ED visit          # Sepsis ( 101 temp, tachy, and elevated WBC in ED)  #Proctocolitis  #H/O IPMN  CTAP:   Mildly thickened rectal wall with surrounding inflammatory changes, may reflect acute proctocolitis. Underlying mass within the rectum cannot be excluded. Follow-up recommended.  s/p CTX and flagyl in ED w/ 1 L fluid  - GI consult ( f/u with Dr. Dash Alan outpt)  - F/U Blood and urine cx  - bowel regimen   - Advance to full liquid diet  - trend WBC and monitor fever curve  - trend H+H  -Changed abx to cipro due to likely Flagyl allergy    # Asthma   - c/w Xopenex prn ( non-formulary completed w/ only nebulizer version available)    #DM  -Patient is on mounjaro, hold medication for now   - diet controlled at home  - start ISS and monitor FSS    #HTN  - diet controlled   - monitor BPs    # H/O PVCs/sinus tach  - c/w nebivolol (  non-formulary completed)      #Anxiety  - c/w alprazolam prn    #VANDANA  -Continue with cpap      VTE ppx: SCDs    #Progress Note Handoff  Pending (specify):  as above   Family discussion:  plan of care was discussed with patient and family in details.  all questions were answered.  seems to understand, and in agreement  Disposition:  Home

## 2024-10-03 NOTE — PROGRESS NOTE ADULT - SUBJECTIVE AND OBJECTIVE BOX
59yFemale  Being followed for rectal pain  Interval history: Patient denies nausea, vomiting, hematemesis, melena, blood in stool, diarrhea, constipation, abdominal pain. Patient reports till having some rectal pain. patient had soft bm yesterday without blood.      PAST MEDICAL & SURGICAL HISTORY:   HTN (hypertension)      HLD (hyperlipidemia)      Fatty liver      Asthma      S/P abdominoplasty      S/P bilateral breast reduction                Social History: No smoking. No alcohol. No illegal drug use.            MEDICATIONS  (STANDING):  dextrose 5%. 1000 milliLiter(s) (50 mL/Hr) IV Continuous <Continuous>  dextrose 5%. 1000 milliLiter(s) (100 mL/Hr) IV Continuous <Continuous>  dextrose 50% Injectable 12.5 Gram(s) IV Push once  dextrose 50% Injectable 25 Gram(s) IV Push once  dextrose 50% Injectable 25 Gram(s) IV Push once  fluticasone propionate/ salmeterol 100-50 MICROgram(s) Diskus 1 Dose(s) Inhalation two times a day  glucagon  Injectable 1 milliGRAM(s) IntraMuscular once  insulin lispro (ADMELOG) corrective regimen sliding scale   SubCutaneous three times a day before meals  insulin lispro (ADMELOG) corrective regimen sliding scale   SubCutaneous at bedtime  lactated ringers. 1000 milliLiter(s) (75 mL/Hr) IV Continuous <Continuous>  nebivolol 5 milliGRAM(s) Oral daily  senna 2 Tablet(s) Oral at bedtime    MEDICATIONS  (PRN):  acetaminophen     Tablet .. 650 milliGRAM(s) Oral every 6 hours PRN Temp greater or equal to 38C (100.4F), Mild Pain (1 - 3)  ALPRAZolam 0.5 milliGRAM(s) Oral four times a day PRN anxiety  dextrose Oral Gel 15 Gram(s) Oral once PRN Blood Glucose LESS THAN 70 milliGRAM(s)/deciliter  diphenhydrAMINE Injectable 50 milliGRAM(s) IntraMuscular every 4 hours PRN Rash and/or Itching  HYDROmorphone  Injectable 0.5 milliGRAM(s) IV Push every 8 hours PRN Severe Pain (7 - 10)  levalbuterol Inhalation 1.25 milliGRAM(s) Inhalation every 6 hours PRN SOB and Wheezing  melatonin 3 milliGRAM(s) Oral at bedtime PRN Insomnia  ondansetron Injectable 4 milliGRAM(s) IV Push every 8 hours PRN Nausea and/or Vomiting  polyethylene glycol 3350 17 Gram(s) Oral two times a day PRN Constipation      Allergies:   Augmentin (Stomach Upset)  Rocephin (Rash)  Flagyl (Rash)  latex (Rash)  sulfa drugs (Angioedema)  Sulfate (Unknown)  Intolerances           REVIEW OF SYSTEMS:  General:  No weight loss, fevers, or chills.  Eyes:  No reported pain or visual changes  ENT:  No sore throat or runny nose.  NECK: No stiffness   CV:  No chest pain or palpitations.  Resp:  No shortness of breath, cough  GI:  No abdominal pain, nausea, vomiting, dysphagia, diarrhea +constipation. No rectal bleeding, melena, or hematemesis +rectal pain  Muscle:  No aches or weakness  Neuro:  No tingling, numbness         VITAL SIGNS:   T(F): 98.2 (10-03-24 @ 04:46), Max: 100.2 (10-02-24 @ 21:00)  HR: 85 (10-03-24 @ 04:46) (76 - 90)  BP: 108/68 (10-03-24 @ 04:46) (108/68 - 120/76)  RR: 18 (10-03-24 @ 04:46) (16 - 18)  SpO2: 96% (10-03-24 @ 04:46) (96% - 98%)    PHYSICAL EXAM:  GENERAL: AAOx3, no acute distress.  HEAD:  Atraumatic, Normocephalic  EYES: conjunctiva and sclera clear  NECK: Supple, no JVD or thyromegaly  CHEST/LUNG: Clear to auscultation bilaterally; No wheeze, rhonchi, or rales  HEART: Regular rate and rhythm; normal S1, S2, No murmurs.  ABDOMEN: Soft, nontender, nondistended; Bowel sounds present  NEUROLOGY: No asterixis or tremor.   SKIN: Intact, no jaundice            LABS:                        13.4   4.57  )-----------( 220      ( 03 Oct 2024 05:47 )             38.6     10-03    144  |  104  |  11  ----------------------------<  171[H]  4.3   |  28  |  0.8    Ca    9.2      03 Oct 2024 05:47  Mg     1.9     10-01    TPro  6.4  /  Alb  4.3  /  TBili  0.3  /  DBili  x   /  AST  25  /  ALT  39  /  AlkPhos  75  10-03    LIVER FUNCTIONS - ( 03 Oct 2024 05:47 )  Alb: 4.3 g/dL / Pro: 6.4 g/dL / ALK PHOS: 75 U/L / ALT: 39 U/L / AST: 25 U/L / GGT: x               IMAGING:    < from: CT Abdomen and Pelvis w/ IV Cont (10.01.24 @ 16:56) >    ACC: [id number] EXAM:  CT ABDOMEN AND PELVIS IC   ORDERED BY: SAMM WAGNER     PROCEDURE DATE:  10/01/2024          INTERPRETATION:  CLINICAL INFORMATION: Lower abdominal pain,   constipation, fever, rectal bleeding.    COMPARISON: None.    CONTRAST:  IV Contrast: Omnipaque 350  90 cc administered   10 cc discarded  Oral Contrast: NONE    PROCEDURE:  CT of the Abdomen and Pelvis was performed.  Sagittal and coronal reformats were performed.    FINDINGS:  LOWER CHEST: Bibasilar segmental atelectasis.    LIVER: Diffuse hepatic steatosis. Subcentimeter hypodensity in the right   hepatic lobe, too small to further characterize.  BILE DUCTS: Normal caliber.  GALLBLADDER: Within normal limits.  SPLEEN: Within normal limits.  PANCREAS: Within normal limits.  ADRENALS: Within normal limits.  KIDNEYS/URETERS: Symmetric renal enhancement. No hydronephrosis.    BLADDER: Within normal limits.  REPRODUCTIVE ORGANS: Status post hysterectomy.    BOWEL: Mildly thickened rectal wall with surrounding inflammatory changes   and fat stranding. No bowel obstruction or pneumoperitoneum. Colonic   diverticulosis without evidence of acute diverticulitis. Appendix is   normal.  PERITONEUM/RETROPERITONEUM: Within normal limits.  VESSELS: Mild atherosclerosis changes of the aorta and its branches.  LYMPH NODES: No lymphadenopathy.  ABDOMINAL WALL: Within normal limits.  BONES: Mild degenerative changes of the spine. Scoliosis    IMPRESSION:    1.  Mildly thickened rectal wall with surrounding inflammatory changes,   may reflect acute proctocolitis. Underlying mass within the rectum cannot   be excluded. Follow-up recommended.    --- End of Report ---          DI STONER MD; Resident Radiologist  This document has been electronically signed.  IZABEL SOLIS MD; Attending Radiologist  This document has been electronically signed. Oct  1 2024  6:47PM    < end of copied text >         59yFemale  Being followed for rectal pain  Interval history: Patient denies nausea, vomiting, hematemesis, melena, blood in stool, diarrhea, constipation, abdominal pain. Patient reports still having some rectal pain. patient had soft bm yesterday without blood.      PAST MEDICAL & SURGICAL HISTORY:   HTN (hypertension)      HLD (hyperlipidemia)      Fatty liver      Asthma      S/P abdominoplasty      S/P bilateral breast reduction                Social History: No smoking. No alcohol. No illegal drug use.            MEDICATIONS  (STANDING):  dextrose 5%. 1000 milliLiter(s) (50 mL/Hr) IV Continuous <Continuous>  dextrose 5%. 1000 milliLiter(s) (100 mL/Hr) IV Continuous <Continuous>  dextrose 50% Injectable 12.5 Gram(s) IV Push once  dextrose 50% Injectable 25 Gram(s) IV Push once  dextrose 50% Injectable 25 Gram(s) IV Push once  fluticasone propionate/ salmeterol 100-50 MICROgram(s) Diskus 1 Dose(s) Inhalation two times a day  glucagon  Injectable 1 milliGRAM(s) IntraMuscular once  insulin lispro (ADMELOG) corrective regimen sliding scale   SubCutaneous three times a day before meals  insulin lispro (ADMELOG) corrective regimen sliding scale   SubCutaneous at bedtime  lactated ringers. 1000 milliLiter(s) (75 mL/Hr) IV Continuous <Continuous>  nebivolol 5 milliGRAM(s) Oral daily  senna 2 Tablet(s) Oral at bedtime    MEDICATIONS  (PRN):  acetaminophen     Tablet .. 650 milliGRAM(s) Oral every 6 hours PRN Temp greater or equal to 38C (100.4F), Mild Pain (1 - 3)  ALPRAZolam 0.5 milliGRAM(s) Oral four times a day PRN anxiety  dextrose Oral Gel 15 Gram(s) Oral once PRN Blood Glucose LESS THAN 70 milliGRAM(s)/deciliter  diphenhydrAMINE Injectable 50 milliGRAM(s) IntraMuscular every 4 hours PRN Rash and/or Itching  HYDROmorphone  Injectable 0.5 milliGRAM(s) IV Push every 8 hours PRN Severe Pain (7 - 10)  levalbuterol Inhalation 1.25 milliGRAM(s) Inhalation every 6 hours PRN SOB and Wheezing  melatonin 3 milliGRAM(s) Oral at bedtime PRN Insomnia  ondansetron Injectable 4 milliGRAM(s) IV Push every 8 hours PRN Nausea and/or Vomiting  polyethylene glycol 3350 17 Gram(s) Oral two times a day PRN Constipation      Allergies:   Augmentin (Stomach Upset)  Rocephin (Rash)  Flagyl (Rash)  latex (Rash)  sulfa drugs (Angioedema)  Sulfate (Unknown)  Intolerances           REVIEW OF SYSTEMS:  General:  No weight loss, fevers, or chills.  Eyes:  No reported pain or visual changes  ENT:  No sore throat or runny nose.  NECK: No stiffness   CV:  No chest pain or palpitations.  Resp:  No shortness of breath, cough  GI:  No abdominal pain, nausea, vomiting, dysphagia, diarrhea +constipation. No rectal bleeding, melena, or hematemesis +rectal pain  Muscle:  No aches or weakness  Neuro:  No tingling, numbness         VITAL SIGNS:   T(F): 98.2 (10-03-24 @ 04:46), Max: 100.2 (10-02-24 @ 21:00)  HR: 85 (10-03-24 @ 04:46) (76 - 90)  BP: 108/68 (10-03-24 @ 04:46) (108/68 - 120/76)  RR: 18 (10-03-24 @ 04:46) (16 - 18)  SpO2: 96% (10-03-24 @ 04:46) (96% - 98%)    PHYSICAL EXAM:  GENERAL: AAOx3, no acute distress.  HEAD:  Atraumatic, Normocephalic  EYES: conjunctiva and sclera clear  NECK: Supple, no JVD or thyromegaly  CHEST/LUNG: Clear to auscultation bilaterally; No wheeze, rhonchi, or rales  HEART: Regular rate and rhythm; normal S1, S2, No murmurs.  ABDOMEN: Soft, nontender, nondistended; Bowel sounds present  NEUROLOGY: No asterixis or tremor.   SKIN: Intact, no jaundice            LABS:                        13.4   4.57  )-----------( 220      ( 03 Oct 2024 05:47 )             38.6     10-03    144  |  104  |  11  ----------------------------<  171[H]  4.3   |  28  |  0.8    Ca    9.2      03 Oct 2024 05:47  Mg     1.9     10-01    TPro  6.4  /  Alb  4.3  /  TBili  0.3  /  DBili  x   /  AST  25  /  ALT  39  /  AlkPhos  75  10-03    LIVER FUNCTIONS - ( 03 Oct 2024 05:47 )  Alb: 4.3 g/dL / Pro: 6.4 g/dL / ALK PHOS: 75 U/L / ALT: 39 U/L / AST: 25 U/L / GGT: x               IMAGING:    < from: CT Abdomen and Pelvis w/ IV Cont (10.01.24 @ 16:56) >    ACC: [id number] EXAM:  CT ABDOMEN AND PELVIS IC   ORDERED BY: SAMM WAGNER     PROCEDURE DATE:  10/01/2024          INTERPRETATION:  CLINICAL INFORMATION: Lower abdominal pain,   constipation, fever, rectal bleeding.    COMPARISON: None.    CONTRAST:  IV Contrast: Omnipaque 350  90 cc administered   10 cc discarded  Oral Contrast: NONE    PROCEDURE:  CT of the Abdomen and Pelvis was performed.  Sagittal and coronal reformats were performed.    FINDINGS:  LOWER CHEST: Bibasilar segmental atelectasis.    LIVER: Diffuse hepatic steatosis. Subcentimeter hypodensity in the right   hepatic lobe, too small to further characterize.  BILE DUCTS: Normal caliber.  GALLBLADDER: Within normal limits.  SPLEEN: Within normal limits.  PANCREAS: Within normal limits.  ADRENALS: Within normal limits.  KIDNEYS/URETERS: Symmetric renal enhancement. No hydronephrosis.    BLADDER: Within normal limits.  REPRODUCTIVE ORGANS: Status post hysterectomy.    BOWEL: Mildly thickened rectal wall with surrounding inflammatory changes   and fat stranding. No bowel obstruction or pneumoperitoneum. Colonic   diverticulosis without evidence of acute diverticulitis. Appendix is   normal.  PERITONEUM/RETROPERITONEUM: Within normal limits.  VESSELS: Mild atherosclerosis changes of the aorta and its branches.  LYMPH NODES: No lymphadenopathy.  ABDOMINAL WALL: Within normal limits.  BONES: Mild degenerative changes of the spine. Scoliosis    IMPRESSION:    1.  Mildly thickened rectal wall with surrounding inflammatory changes,   may reflect acute proctocolitis. Underlying mass within the rectum cannot   be excluded. Follow-up recommended.    --- End of Report ---          DI STONER MD; Resident Radiologist  This document has been electronically signed.  IZABEL SOLIS MD; Attending Radiologist  This document has been electronically signed. Oct  1 2024  6:47PM    < end of copied text >

## 2024-10-03 NOTE — PROGRESS NOTE ADULT - ASSESSMENT
59yFemale pmh DM on Mounjaro last dose last week, HLD, IPMN, splenomegaly, recent COVID-19 presents for rectal pain after 3 days of constipation, patient tried to manually disimpact herself and developed severe pain and noticed a few drops of blood. Patient with history of Colonoscopy as per patient that showed internal hemorrhoids.    #Rectal pain, Constipation  #weight loss from mounjaro  #Mildly thickened rectal wall with surrounding inflammatory changes,   may reflect acute proctocolitis. Underlying mass within the rectum cannot   be excluded. Follow-up recommended.  10/2/24-erythema noted in perianal area, no stool burden in finger's reach, no blood in stool noted  Rec  -miralax BID-TID, do not give senna any further patient allergic  -anusol cream  -consider risks and benefits of mounjaro   -patient to followup with Dr. Granado for outpatient Colonoscopy, patient to bring Colonoscopy records from last year  - Follow up with our GI office located on [address] [place] [address], Phone number [phone] with Dr. Granado    #Diffuse hepatic steatosis  Rec  - patient seeing Dr. Alarcon, outpatient   - Follow up with our GI office located on [address] [place] [address], Phone number [phone] with Dr. Alarcon 59yFemale pmh DM on Mounjaro last dose last week, HLD, IPMN, splenomegaly, recent COVID-19 presents for rectal pain after 3 days of constipation, patient tried to manually disimpact herself and developed severe pain and noticed a few drops of blood. Patient with history of Colonoscopy as per patient that showed internal hemorrhoids.    #Rectal pain, Constipation  #weight loss from mounjaro  #Mildly thickened rectal wall with surrounding inflammatory changes,   may reflect acute proctocolitis. Underlying mass within the rectum cannot   be excluded. Follow-up recommended.  10/2/24-erythema noted in perianal area, no stool burden in finger's reach, no blood in stool noted  Rec  -miralax BID-TID, do not give senna any further patient allergic  -anusol cream  -consider risks and benefits of mounjaro   -can do just Rocephin given possible flagyl allergy   -patient to followup with Dr. Granado for outpatient Colonoscopy, patient to bring Colonoscopy records from last year  - Follow up with our GI office located on [address] [place] [address], Phone number [phone] with Dr. Granado    #Diffuse hepatic steatosis  Rec  - patient seeing Dr. Alarcon, outpatient   - Follow up with our GI office located on [address] [place] [address], Phone number [phone] with Dr. Alarcon 59yFemale pmh DM on Mounjaro last dose last week, HLD, IPMN, splenomegaly, recent COVID-19 presents for rectal pain after 3 days of constipation, patient tried to manually disimpact herself and developed severe pain and noticed a few drops of blood. Patient with history of Colonoscopy as per patient that showed internal hemorrhoids.    #Rectal pain, Constipation  #weight loss from mounjaro  #Mildly thickened rectal wall with surrounding inflammatory changes,   may reflect acute proctocolitis. Underlying mass within the rectum cannot   be excluded. Follow-up recommended.  10/2/24-erythema noted in perianal area, no stool burden in finger's reach, no blood in stool noted  Rec  -miralax BID-TID, do not give senna any further patient allergic  -anusol cream  -consider risks and benefits of mounjaro   -can do just Rocephin given possible flagyl allergy   -patient to followup with Dr. Granado for outpatient Colonoscopy, patient to bring Colonoscopy records from last year  - Follow up with our GI office located on [address] [place] [address], Phone number [phone] with Dr. Grnaado 10/28/24    #Diffuse hepatic steatosis  Rec  - patient seeing Dr. Alarcon, outpatient   - Follow up with our GI office located on [address] [place] [address], Phone number [phone] with Dr. Alarcon 59yFemale pmh DM on Mounjaro last dose last week, HLD, IPMN, splenomegaly, recent COVID-19 presents for rectal pain after 3 days of constipation, patient tried to manually disimpact herself and developed severe pain and noticed a few drops of blood. Patient with history of Colonoscopy as per patient that showed internal hemorrhoids.    #Rectal pain, Constipation  #weight loss from mounjaro  #Mildly thickened rectal wall with surrounding inflammatory changes,   may reflect acute proctocolitis. Underlying mass within the rectum cannot   be excluded. Follow-up recommended.  10/2/24-erythema noted in perianal area, no stool burden in finger's reach, no blood in stool noted  Rec  -miralax BID-TID, do not give senna any further patient allergic  -anusol cream  -consider risks and benefits of mounjaro   -can do just Cipro given possible flagyl allergy   -patient to followup with Dr. Granado for outpatient Colonoscopy, patient to bring Colonoscopy records from last year  - Follow up with our GI office located on [address] [place] [address], Phone number [phone] with Dr. Granado 10/28/24    #Diffuse hepatic steatosis  Rec  - patient seeing Dr. Alarcon, outpatient   - Follow up with our GI office located on [address] [place] [address], Phone number [phone] with Dr. Alarcon 59yFemale pmh DM on Mounjaro last dose last week, HLD, IPMN, splenomegaly, recent COVID-19 presents for rectal pain after 3 days of constipation, patient tried to manually disimpact herself and developed severe pain and noticed a few drops of blood. Patient with history of Colonoscopy as per patient that showed internal hemorrhoids.    #Rectal pain, Constipation  #weight loss from mounjaro  #Mildly thickened rectal wall with surrounding inflammatory changes,   may reflect acute proctocolitis. Underlying mass within the rectum cannot   be excluded. Follow-up recommended.  10/2/24-erythema noted in perianal area, no stool burden in finger's reach, no blood in stool noted  Rec  -miralax BID-TID, do not give senna any further patient allergic  -anusol cream  -consider risks and benefits of mounjaro   -can do just Cipro given possible flagyl allergy   -patient to followup with Dr. Granado for outpatient Colonoscopy, patient to bring Colonoscopy records from last year  - Follow up with our GI office located on [address] [place] [address], Phone number [phone] with Dr. Granado 10/28/24    #Diffuse hepatic steatosis  Rec  - patient seeing Dr. Alarcon, outpatient   - Follow up with our GI office located on [address] [place] [address], Phone number [phone] with Dr. Alarcon    #pancreatic cyst possible IPMN history  Rec  -If inpatient possible consider inpatient MRI with and without contrast with MRCP views, patient has outpatient script  -Follow up with our GI office located on [address] [place] [address], Phone number [phone] with Dr. Granado 10/28/24

## 2024-10-03 NOTE — PROGRESS NOTE ADULT - SUBJECTIVE AND OBJECTIVE BOX
58 y/o F PMHx  DM2, HLD, IPMN, splenomegaly, and reactive airway disease c/o 1 day h/o severe rectal pain, pain w/ urination, & fever and nausea and 3 day h/o constipation. Pt reports she woke up feeling severe rectal pain and pelvic pressure, w/ feeling like she needed to pass BM, but unable to do despite trying for 3 hours. She noted she attempted disimpaction, but made pain worse, stating she also noted a few drops of bright red blood at the time, but attributed to her h/o hemorrhoids Pt reports 101 temp ~1:30 today, prompting ED visit    Today:  Seen at bedside, had unclear reaction overnight to Rocephin/Flagyl.  As she had not been allergic in the past, patient wanted to try Rocephin and Flagyl again and avoid Senna as it contains sulfa.  Was given Flagyl and immediately broke out into widespread macular rash, no tongue or lip swelling, no throat closing.  Flagyl stopped immediately, IV Benadryl given.          REVIEW OF SYSTEMS:  rash      MEDICATIONS  (STANDING):  ciprofloxacin   IVPB 400 milliGRAM(s) IV Intermittent every 12 hours  dextrose 5%. 1000 milliLiter(s) (50 mL/Hr) IV Continuous <Continuous>  dextrose 5%. 1000 milliLiter(s) (100 mL/Hr) IV Continuous <Continuous>  dextrose 50% Injectable 12.5 Gram(s) IV Push once  dextrose 50% Injectable 25 Gram(s) IV Push once  dextrose 50% Injectable 25 Gram(s) IV Push once  fluticasone propionate/ salmeterol 100-50 MICROgram(s) Diskus 1 Dose(s) Inhalation two times a day  glucagon  Injectable 1 milliGRAM(s) IntraMuscular once  insulin lispro (ADMELOG) corrective regimen sliding scale   SubCutaneous three times a day before meals  insulin lispro (ADMELOG) corrective regimen sliding scale   SubCutaneous at bedtime  lactated ringers. 1000 milliLiter(s) (75 mL/Hr) IV Continuous <Continuous>  nebivolol 5 milliGRAM(s) Oral daily    MEDICATIONS  (PRN):  acetaminophen     Tablet .. 650 milliGRAM(s) Oral every 6 hours PRN Temp greater or equal to 38C (100.4F), Mild Pain (1 - 3)  ALPRAZolam 0.5 milliGRAM(s) Oral four times a day PRN anxiety  dextrose Oral Gel 15 Gram(s) Oral once PRN Blood Glucose LESS THAN 70 milliGRAM(s)/deciliter  diphenhydrAMINE 25 milliGRAM(s) Oral every 6 hours PRN Rash and/or Itching  diphenhydrAMINE Injectable 25 milliGRAM(s) IV Push every 6 hours PRN Allergy symptoms  HYDROmorphone  Injectable 0.5 milliGRAM(s) IV Push every 8 hours PRN Severe Pain (7 - 10)  levalbuterol Inhalation 1.25 milliGRAM(s) Inhalation every 6 hours PRN SOB and Wheezing  melatonin 3 milliGRAM(s) Oral at bedtime PRN Insomnia  ondansetron Injectable 4 milliGRAM(s) IV Push every 8 hours PRN Nausea and/or Vomiting  polyethylene glycol 3350 17 Gram(s) Oral two times a day PRN Constipation      Allergies  Flagyl (Rash)  latex (Rash)  senna (Rash)  sulfa drugs (Angioedema)  Sulfate (Unknown)        Vital Signs Last 24 Hrs  T(C): 37.1 (03 Oct 2024 14:17), Max: 37.9 (02 Oct 2024 21:00)  T(F): 98.7 (03 Oct 2024 14:17), Max: 100.2 (02 Oct 2024 21:00)  HR: 91 (03 Oct 2024 14:17) (85 - 91)  BP: 124/80 (03 Oct 2024 14:17) (108/68 - 124/80)  RR: 18 (03 Oct 2024 04:46) (16 - 18)  SpO2: 97% (03 Oct 2024 14:17) (96% - 98%)    Parameters below as of 03 Oct 2024 04:46  Patient On (Oxygen Delivery Method): room air        PHYSICAL EXAM:  GENERAL: laying in bed, appearing comfortable and in NAD; non-septic appearing  HEENT: Moist mucous membranes  NECK: Supple  CHEST/LUNG: even respirations b/l; no accessory muscle use  ABDOMEN: Soft, Nontender, Nondistended  EXTREMITIES:   No calf TTP b/l  SKIN: warm  Neuro: A&Ox4      LABS:                        13.4   4.57  )-----------( 220      ( 03 Oct 2024 05:47 )             38.6     10-03    144  |  104  |  11  ----------------------------<  171[H]  4.3   |  28  |  0.8    Ca    9.2      03 Oct 2024 05:47  Mg     1.9     10-01    TPro  6.4  /  Alb  4.3  /  TBili  0.3  /  DBili  x   /  AST  25  /  ALT  39  /  AlkPhos  75  10-03      Urinalysis Basic - ( 03 Oct 2024 05:47 )    Color: x / Appearance: x / SG: x / pH: x  Gluc: 171 mg/dL / Ketone: x  / Bili: x / Urobili: x   Blood: x / Protein: x / Nitrite: x   Leuk Esterase: x / RBC: x / WBC x   Sq Epi: x / Non Sq Epi: x / Bacteria: x

## 2024-10-03 NOTE — CONSULT NOTE ADULT - SUBJECTIVE AND OBJECTIVE BOX
INFECTIOUS DISEASE CONSULT NOTE    Patient is a 59y old  Female who presents with a chief complaint of rectal pain (02 Oct 2024 15:40)    HPI:  60 y/o F PMHx  DM2, HLD, IPMN, splenomegaly, and reactive airway disease c/o 1 day h/o severe rectal pain, pain w/ urination, & fever and nausea and 3 day h/o constipation. Pt reports she woke up feeling severe rectal pain and pelvic pressure, w/ feeling like she needed to pass BM, but unable to do despite trying for 3 hours. She noted she attempted disimpaction, but made pain worse, stating she also noted a few drops of bright red blood at the time, but attributed to her h/o hemorrhoids Pt reports 101 temp ~1:30 today, prompting ED visit    Pt denies vomiting, unable to tolerate PO intake, h/o chronic constipation, bloody BMs/urine, cough       (01 Oct 2024 19:51)         Prior hospital charts reviewed [Yes]  Primary team notes reviewed [Yes]  Other consultant notes reviewed [Yes]    REVIEW OF SYSTEMS:      PAST MEDICAL & SURGICAL HISTORY:  HTN (hypertension)      HLD (hyperlipidemia)      Fatty liver      Asthma      S/P abdominoplasty      S/P bilateral breast reduction          SOCIAL HISTORY:  - Born in [place], migrated [place] in 19XX  - Currently working as / Retired  - Lives with _____; no pets  - No recent travel  - Denies tobacco use  - Denies alcohol use  - Denies illicit drug use  - Currently sexually active, has one male/female sexual partner    FAMILY HISTORY:      Allergies:  Augmentin (Stomach Upset)  Rocephin (Rash)  Flagyl (Rash)  latex (Rash)  sulfa drugs (Angioedema)  Sulfate (Unknown)      ANTIMICROBIALS:      ANTIMICROBIALS (past 90 days):  MEDICATIONS  (STANDING):  cefTRIAXone   IVPB   100 mL/Hr IV Intermittent (10-01-24 @ 18:21)    cefTRIAXone   IVPB   100 mL/Hr IV Intermittent (10-02-24 @ 17:42)    metroNIDAZOLE  IVPB   100 mL/Hr IV Intermittent (10-02-24 @ 17:43)   100 mL/Hr IV Intermittent (10-02-24 @ 06:24)   100 mL/Hr IV Intermittent (10-01-24 @ 19:37)        OTHER MEDS:   MEDICATIONS  (STANDING):  acetaminophen     Tablet .. 650 every 6 hours PRN  ALPRAZolam 0.5 four times a day PRN  dextrose 50% Injectable 12.5 once  dextrose 50% Injectable 25 once  dextrose 50% Injectable 25 once  dextrose Oral Gel 15 once PRN  diphenhydrAMINE Injectable 50 every 4 hours PRN  fluticasone propionate/ salmeterol 100-50 MICROgram(s) Diskus 1 two times a day  glucagon  Injectable 1 once  HYDROmorphone  Injectable 0.5 every 8 hours PRN  insulin lispro (ADMELOG) corrective regimen sliding scale  three times a day before meals  insulin lispro (ADMELOG) corrective regimen sliding scale  at bedtime  levalbuterol Inhalation 1.25 every 6 hours PRN  melatonin 3 at bedtime PRN  nebivolol 5 daily  ondansetron Injectable 4 every 8 hours PRN  polyethylene glycol 3350 17 two times a day PRN  senna 2 at bedtime      VITALS:  Vital Signs Last 24 Hrs  T(F): 98.2 (10-03-24 @ 04:46), Max: 101 (10-01-24 @ 15:09)    Vital Signs Last 24 Hrs  HR: 85 (10-03-24 @ 04:46) (76 - 90)  BP: 108/68 (10-03-24 @ 04:46) (108/68 - 120/76)  RR: 18 (10-03-24 @ 04:46)  SpO2: 96% (10-03-24 @ 04:46) (96% - 98%)  Wt(kg): --    EXAM:    Labs:                        13.4   4.57  )-----------( 220      ( 03 Oct 2024 05:47 )             38.6     10-03    144  |  104  |  11  ----------------------------<  171[H]  4.3   |  28  |  0.8    Ca    9.2      03 Oct 2024 05:47  Mg     1.9     10-01    TPro  6.4  /  Alb  4.3  /  TBili  0.3  /  DBili  x   /  AST  25  /  ALT  39  /  AlkPhos  75  10-03      WBC Trend:  WBC Count: 4.57 (10-03-24 @ 05:47)  WBC Count: 7.44 (10-02-24 @ 05:47)  WBC Count: 16.36 (10-01-24 @ 15:16)      Auto Neutrophil #: 3.81 K/uL (10-03-24 @ 05:47)  Auto Neutrophil #: 14.27 K/uL (10-01-24 @ 15:16)  Auto Neutrophil #: 3.55 K/uL (11-22-23 @ 07:12)  Auto Neutrophil #: 8.19 K/uL (11-21-23 @ 07:31)  Auto Neutrophil #: 11.63 K/uL (11-20-23 @ 07:30)      Creatine Trend:  Creatinine: 0.8 (10-03)  Creatinine: 0.9 (10-02)  Creatinine: 0.9 (10-01)      Liver Biochemical Testing Trend:  Alanine Aminotransferase (ALT/SGPT): 39 (10-03)  Alanine Aminotransferase (ALT/SGPT): 39 (10-01)  Alanine Aminotransferase (ALT/SGPT): 39 (11-22)  Alanine Aminotransferase (ALT/SGPT): 33 (11-21)  Alanine Aminotransferase (ALT/SGPT): 36 (11-20)  Aspartate Aminotransferase (AST/SGOT): 25 (10-03-24 @ 05:47)  Aspartate Aminotransferase (AST/SGOT): 29 (10-01-24 @ 15:16)  Aspartate Aminotransferase (AST/SGOT): 24 (11-22-23 @ 07:12)  Aspartate Aminotransferase (AST/SGOT): 16 (11-21-23 @ 07:31)  Aspartate Aminotransferase (AST/SGOT): 16 (11-20-23 @ 07:30)  Bilirubin Total: 0.3 (10-03)  Bilirubin Total: 0.6 (10-01)  Bilirubin Total: 0.3 (11-22)  Bilirubin Total: 0.3 (11-21)  Bilirubin Total: 0.4 (11-20)      Trend LDH      Auto Eosinophil %: 0.0 % (10-03-24 @ 05:47)  Auto Eosinophil %: 0.1 % (10-01-24 @ 15:16)      Urinalysis Basic - ( 03 Oct 2024 05:47 )    Color: x / Appearance: x / SG: x / pH: x  Gluc: 171 mg/dL / Ketone: x  / Bili: x / Urobili: x   Blood: x / Protein: x / Nitrite: x   Leuk Esterase: x / RBC: x / WBC x   Sq Epi: x / Non Sq Epi: x / Bacteria: x          MICROBIOLOGY:        Urinalysis with Rflx Culture (collected 01 Oct 2024 18:00)    Culture - Blood (collected 01 Oct 2024 15:16)  Source: .Blood BLOOD  Preliminary Report (02 Oct 2024 22:01):    No growth at 24 hours    Culture - Blood (collected 01 Oct 2024 15:16)  Source: .Blood BLOOD  Preliminary Report (02 Oct 2024 22:01):    No growth at 24 hours    Lactate, Blood: 1.5 (10-01 @ 15:16)    A1C with Estimated Average Glucose Result: 5.9 % (10-02-24 @ 05:47)        RADIOLOGY:  imaging below personally reviewed    < from: CT Abdomen and Pelvis w/ IV Cont (10.01.24 @ 16:56) >  IMPRESSION:    1.  Mildly thickened rectal wall with surrounding inflammatory changes,   may reflect acute proctocolitis. Underlying mass within the rectum cannot   be excluded. Follow-up recommended.    < end of copied text >   INFECTIOUS DISEASE CONSULT NOTE    Patient is a 59y old  Female who presents with a chief complaint of rectal pain (02 Oct 2024 15:40)    HPI:  58 y/o F PMHx  DM2, HLD, IPMN, splenomegaly, and reactive airway disease c/o 1 day h/o severe rectal pain, pain w/ urination, & fever and nausea and 3 day h/o constipation. Pt reports she woke up feeling severe rectal pain and pelvic pressure, w/ feeling like she needed to pass BM, but unable to do despite trying for 3 hours. She noted she attempted disimpaction, but made pain worse, stating she also noted a few drops of bright red blood at the time, but attributed to her h/o hemorrhoids Pt reports 101 temp ~1:30 today, prompting ED visit    Pt denies vomiting, unable to tolerate PO intake, h/o chronic constipation, bloody BMs/urine, cough    (01 Oct 2024 19:51)      Prior hospital charts reviewed [Yes]  Primary team notes reviewed [Yes]  Other consultant notes reviewed [Yes]    REVIEW OF SYSTEMS:  CONSTITUTIONAL: No fever or chills  HEAD: No lesion on scalp  EYES: No visual disturbance  ENT: No sore throat  RESPIRATORY: No cough, no shortness of breath  CARDIOVASCULAR: No chest pain or palpitations  GASTROINTESTINAL: No abdominal or epigastric pain; rectal pain, constipation  GENITOURINARY: No dysuria  NEUROLOGICAL: No headache/dizziness  MUSCULOSKELETAL: No joint pain, erythema, or swelling; no back pain  SKIN: No itching, rashes  All other ROS negative except noted above      PAST MEDICAL & SURGICAL HISTORY:  HTN (hypertension)      HLD (hyperlipidemia)      Fatty liver      Asthma      S/P abdominoplasty      S/P bilateral breast reduction          SOCIAL HISTORY:  - No recent travel  - Denies tobacco use  - Denies alcohol use  - Denies illicit drug use    FAMILY HISTORY:  No family history of GI malignancy    Allergies:  Augmentin (Stomach Upset)  Rocephin (Rash)  Flagyl (Rash)  latex (Rash)  sulfa drugs (Angioedema)  Sulfate (Unknown)      ANTIMICROBIALS:      ANTIMICROBIALS (past 90 days):  MEDICATIONS  (STANDING):  cefTRIAXone   IVPB   100 mL/Hr IV Intermittent (10-01-24 @ 18:21)    cefTRIAXone   IVPB   100 mL/Hr IV Intermittent (10-02-24 @ 17:42)    metroNIDAZOLE  IVPB   100 mL/Hr IV Intermittent (10-02-24 @ 17:43)   100 mL/Hr IV Intermittent (10-02-24 @ 06:24)   100 mL/Hr IV Intermittent (10-01-24 @ 19:37)        OTHER MEDS:   MEDICATIONS  (STANDING):  acetaminophen     Tablet .. 650 every 6 hours PRN  ALPRAZolam 0.5 four times a day PRN  dextrose 50% Injectable 12.5 once  dextrose 50% Injectable 25 once  dextrose 50% Injectable 25 once  dextrose Oral Gel 15 once PRN  diphenhydrAMINE Injectable 50 every 4 hours PRN  fluticasone propionate/ salmeterol 100-50 MICROgram(s) Diskus 1 two times a day  glucagon  Injectable 1 once  HYDROmorphone  Injectable 0.5 every 8 hours PRN  insulin lispro (ADMELOG) corrective regimen sliding scale  three times a day before meals  insulin lispro (ADMELOG) corrective regimen sliding scale  at bedtime  levalbuterol Inhalation 1.25 every 6 hours PRN  melatonin 3 at bedtime PRN  nebivolol 5 daily  ondansetron Injectable 4 every 8 hours PRN  polyethylene glycol 3350 17 two times a day PRN  senna 2 at bedtime      VITALS:  Vital Signs Last 24 Hrs  T(F): 98.2 (10-03-24 @ 04:46), Max: 101 (10-01-24 @ 15:09)    Vital Signs Last 24 Hrs  HR: 85 (10-03-24 @ 04:46) (76 - 90)  BP: 108/68 (10-03-24 @ 04:46) (108/68 - 120/76)  RR: 18 (10-03-24 @ 04:46)  SpO2: 96% (10-03-24 @ 04:46) (96% - 98%)  Wt(kg): --    EXAM:  GENERAL: NAD, lying in bed  HEAD: No head lesions  EYES: Conjunctiva pink and cornea white  EAR, NOSE, MOUTH, THROAT: Normal external ears and nose, no discharges; moist mucous membranes  NECK: Supple, nontender to palpation; no JVD  RESPIRATORY: Clear to auscultation bilaterally  CARDIOVASCULAR: S1 S2  GASTROINTESTINAL: Soft, nontender, nondistended; normoactive bowel sounds  GENITOURINARY: No gallardo catheter, no CVA tenderness  EXTREMITIES: No clubbing, cyanosis, or petal edema  NERVOUS SYSTEM: Alert and oriented to person, time, place and situation, speech clear. No focal deficits   MUSCULOSKELETAL: No joint erythema, swelling or pain  SKIN: Faint maculopapular rash on abdomen and back, no superficial thrombophlebitis  PSYCH: Normal affect    Labs:                        13.4   4.57  )-----------( 220      ( 03 Oct 2024 05:47 )             38.6     10-03    144  |  104  |  11  ----------------------------<  171[H]  4.3   |  28  |  0.8    Ca    9.2      03 Oct 2024 05:47  Mg     1.9     10-01    TPro  6.4  /  Alb  4.3  /  TBili  0.3  /  DBili  x   /  AST  25  /  ALT  39  /  AlkPhos  75  10-03      WBC Trend:  WBC Count: 4.57 (10-03-24 @ 05:47)  WBC Count: 7.44 (10-02-24 @ 05:47)  WBC Count: 16.36 (10-01-24 @ 15:16)      Auto Neutrophil #: 3.81 K/uL (10-03-24 @ 05:47)  Auto Neutrophil #: 14.27 K/uL (10-01-24 @ 15:16)  Auto Neutrophil #: 3.55 K/uL (11-22-23 @ 07:12)  Auto Neutrophil #: 8.19 K/uL (11-21-23 @ 07:31)  Auto Neutrophil #: 11.63 K/uL (11-20-23 @ 07:30)      Creatine Trend:  Creatinine: 0.8 (10-03)  Creatinine: 0.9 (10-02)  Creatinine: 0.9 (10-01)      Liver Biochemical Testing Trend:  Alanine Aminotransferase (ALT/SGPT): 39 (10-03)  Alanine Aminotransferase (ALT/SGPT): 39 (10-01)  Alanine Aminotransferase (ALT/SGPT): 39 (11-22)  Alanine Aminotransferase (ALT/SGPT): 33 (11-21)  Alanine Aminotransferase (ALT/SGPT): 36 (11-20)  Aspartate Aminotransferase (AST/SGOT): 25 (10-03-24 @ 05:47)  Aspartate Aminotransferase (AST/SGOT): 29 (10-01-24 @ 15:16)  Aspartate Aminotransferase (AST/SGOT): 24 (11-22-23 @ 07:12)  Aspartate Aminotransferase (AST/SGOT): 16 (11-21-23 @ 07:31)  Aspartate Aminotransferase (AST/SGOT): 16 (11-20-23 @ 07:30)  Bilirubin Total: 0.3 (10-03)  Bilirubin Total: 0.6 (10-01)  Bilirubin Total: 0.3 (11-22)  Bilirubin Total: 0.3 (11-21)  Bilirubin Total: 0.4 (11-20)    Trend LDH    Auto Eosinophil %: 0.0 % (10-03-24 @ 05:47)  Auto Eosinophil %: 0.1 % (10-01-24 @ 15:16)      Urinalysis Basic - ( 03 Oct 2024 05:47 )    Color: x / Appearance: x / SG: x / pH: x  Gluc: 171 mg/dL / Ketone: x  / Bili: x / Urobili: x   Blood: x / Protein: x / Nitrite: x   Leuk Esterase: x / RBC: x / WBC x   Sq Epi: x / Non Sq Epi: x / Bacteria: x      MICROBIOLOGY:    Urinalysis with Rflx Culture (collected 01 Oct 2024 18:00)    Culture - Blood (collected 01 Oct 2024 15:16)  Source: .Blood BLOOD  Preliminary Report (02 Oct 2024 22:01):    No growth at 24 hours    Culture - Blood (collected 01 Oct 2024 15:16)  Source: .Blood BLOOD  Preliminary Report (02 Oct 2024 22:01):    No growth at 24 hours    Lactate, Blood: 1.5 (10-01 @ 15:16)    A1C with Estimated Average Glucose Result: 5.9 % (10-02-24 @ 05:47)        RADIOLOGY:  imaging below personally reviewed    < from: CT Abdomen and Pelvis w/ IV Cont (10.01.24 @ 16:56) >  IMPRESSION:    1.  Mildly thickened rectal wall with surrounding inflammatory changes,   may reflect acute proctocolitis. Underlying mass within the rectum cannot   be excluded. Follow-up recommended.    < end of copied text >

## 2024-10-03 NOTE — CONSULT NOTE ADULT - ASSESSMENT
58 y/o F PMHx  DM2, HLD, IPMN, splenomegaly, and reactive airway disease c/o 1 day h/o severe rectal pain, pain w/ urination, & fever and nausea and 3 day h/o constipation.    ID is consulted for proctocolitis and abx allergy  Febrile to 101, WBC 16.36 > 4.57  On room air  BCx NGTD  UA WBC 8, UCx pending    CT A/P w contrast 10/1  Mildly thickened rectal wall with surrounding inflammatory changes,   may reflect acute proctocolitis. Underlying mass within the rectum cannot   be excluded. Follow-up recommended.    Antibiotics:  Ceftriaxone 10/1 ->  Flagyl 10/1 ->      IMPRESSION:      RECOMMENDATIONS:      * THIS IS AN INCOMPLETE NOTE. FINAL RECOMMENDATION IS PENDING *   60 y/o F PMHx  DM2, HLD, IPMN, splenomegaly, and reactive airway disease c/o 1 day h/o severe rectal pain, pain w/ urination, & fever and nausea and 3 day h/o constipation.    ID is consulted for proctocolitis and abx allergy  Febrile to 101, WBC 16.36 > 4.57  On room air  BCx NGTD  UA WBC 8, UCx pending    CT A/P w contrast 10/1  Mildly thickened rectal wall with surrounding inflammatory changes,   may reflect acute proctocolitis. Underlying mass within the rectum cannot   be excluded. Follow-up recommended.    Antibiotics:  Ceftriaxone 10/1 ->  Flagyl 10/1 ->      IMPRESSION:  Proctocolitis  Sepsis  Drug allergy    RECOMMENDATIONS:  - Allergic reaction was possibly triggered by Senna, not ceftriaxone and Flagyl  - Patient agrees to try again; can restart IV ceftriaxone 1g q24hrs and PO Flagyl 500mg q12hrs  - On discharge, can switch to PO Cipro 500mg q12hrs and PO Flagyl 500mg q12hrs to complete 7-day total treatment  - Outpatient GI follow up  - Outpatient Allergy eval  - Offloading and frequent position changes, aspiration precaution  - Trend WBC, fever curve, transaminases, creatinine daily      Amisha Mathew D.O.  Attending Physician  Division of Infectious Diseases  [facility]  Please contact me via [employer] Teams

## 2024-10-04 ENCOUNTER — TRANSCRIPTION ENCOUNTER (OUTPATIENT)
Age: 59
End: 2024-10-04

## 2024-10-04 VITALS
TEMPERATURE: 98 F | HEART RATE: 76 BPM | SYSTOLIC BLOOD PRESSURE: 112 MMHG | DIASTOLIC BLOOD PRESSURE: 80 MMHG | RESPIRATION RATE: 18 BRPM | OXYGEN SATURATION: 98 %

## 2024-10-04 LAB
ALBUMIN SERPL ELPH-MCNC: 3.9 G/DL — SIGNIFICANT CHANGE UP (ref 3.5–5.2)
ALP SERPL-CCNC: 65 U/L — SIGNIFICANT CHANGE UP (ref 30–115)
ALT FLD-CCNC: 34 U/L — SIGNIFICANT CHANGE UP (ref 0–41)
ANION GAP SERPL CALC-SCNC: 10 MMOL/L — SIGNIFICANT CHANGE UP (ref 7–14)
AST SERPL-CCNC: 25 U/L — SIGNIFICANT CHANGE UP (ref 0–41)
BASOPHILS # BLD AUTO: 0.04 K/UL — SIGNIFICANT CHANGE UP (ref 0–0.2)
BASOPHILS NFR BLD AUTO: 0.7 % — SIGNIFICANT CHANGE UP (ref 0–1)
BILIRUB SERPL-MCNC: 0.4 MG/DL — SIGNIFICANT CHANGE UP (ref 0.2–1.2)
BUN SERPL-MCNC: 12 MG/DL — SIGNIFICANT CHANGE UP (ref 10–20)
CALCIUM SERPL-MCNC: 8.7 MG/DL — SIGNIFICANT CHANGE UP (ref 8.4–10.5)
CHLORIDE SERPL-SCNC: 104 MMOL/L — SIGNIFICANT CHANGE UP (ref 98–110)
CO2 SERPL-SCNC: 28 MMOL/L — SIGNIFICANT CHANGE UP (ref 17–32)
CREAT SERPL-MCNC: 0.8 MG/DL — SIGNIFICANT CHANGE UP (ref 0.7–1.5)
EGFR: 85 ML/MIN/1.73M2 — SIGNIFICANT CHANGE UP
EOSINOPHIL # BLD AUTO: 0.11 K/UL — SIGNIFICANT CHANGE UP (ref 0–0.7)
EOSINOPHIL NFR BLD AUTO: 1.8 % — SIGNIFICANT CHANGE UP (ref 0–8)
GLUCOSE BLDC GLUCOMTR-MCNC: 90 MG/DL — SIGNIFICANT CHANGE UP (ref 70–99)
GLUCOSE BLDC GLUCOMTR-MCNC: 93 MG/DL — SIGNIFICANT CHANGE UP (ref 70–99)
GLUCOSE BLDC GLUCOMTR-MCNC: 98 MG/DL — SIGNIFICANT CHANGE UP (ref 70–99)
GLUCOSE SERPL-MCNC: 107 MG/DL — HIGH (ref 70–99)
HCT VFR BLD CALC: 38.5 % — SIGNIFICANT CHANGE UP (ref 37–47)
HGB BLD-MCNC: 13.2 G/DL — SIGNIFICANT CHANGE UP (ref 12–16)
IMM GRANULOCYTES NFR BLD AUTO: 0.2 % — SIGNIFICANT CHANGE UP (ref 0.1–0.3)
LYMPHOCYTES # BLD AUTO: 2.83 K/UL — SIGNIFICANT CHANGE UP (ref 1.2–3.4)
LYMPHOCYTES # BLD AUTO: 46.5 % — SIGNIFICANT CHANGE UP (ref 20.5–51.1)
MCHC RBC-ENTMCNC: 31.1 PG — HIGH (ref 27–31)
MCHC RBC-ENTMCNC: 34.3 G/DL — SIGNIFICANT CHANGE UP (ref 32–37)
MCV RBC AUTO: 90.8 FL — SIGNIFICANT CHANGE UP (ref 81–99)
MONOCYTES # BLD AUTO: 0.45 K/UL — SIGNIFICANT CHANGE UP (ref 0.1–0.6)
MONOCYTES NFR BLD AUTO: 7.4 % — SIGNIFICANT CHANGE UP (ref 1.7–9.3)
NEUTROPHILS # BLD AUTO: 2.65 K/UL — SIGNIFICANT CHANGE UP (ref 1.4–6.5)
NEUTROPHILS NFR BLD AUTO: 43.4 % — SIGNIFICANT CHANGE UP (ref 42.2–75.2)
NRBC # BLD: 0 /100 WBCS — SIGNIFICANT CHANGE UP (ref 0–0)
PLATELET # BLD AUTO: 193 K/UL — SIGNIFICANT CHANGE UP (ref 130–400)
PMV BLD: 11.2 FL — HIGH (ref 7.4–10.4)
POTASSIUM SERPL-MCNC: 3.8 MMOL/L — SIGNIFICANT CHANGE UP (ref 3.5–5)
POTASSIUM SERPL-SCNC: 3.8 MMOL/L — SIGNIFICANT CHANGE UP (ref 3.5–5)
PROT SERPL-MCNC: 6.1 G/DL — SIGNIFICANT CHANGE UP (ref 6–8)
RBC # BLD: 4.24 M/UL — SIGNIFICANT CHANGE UP (ref 4.2–5.4)
RBC # FLD: 11.8 % — SIGNIFICANT CHANGE UP (ref 11.5–14.5)
SODIUM SERPL-SCNC: 142 MMOL/L — SIGNIFICANT CHANGE UP (ref 135–146)
WBC # BLD: 6.09 K/UL — SIGNIFICANT CHANGE UP (ref 4.8–10.8)
WBC # FLD AUTO: 6.09 K/UL — SIGNIFICANT CHANGE UP (ref 4.8–10.8)

## 2024-10-04 PROCEDURE — 99239 HOSP IP/OBS DSCHRG MGMT >30: CPT

## 2024-10-04 PROCEDURE — 99233 SBSQ HOSP IP/OBS HIGH 50: CPT

## 2024-10-04 RX ORDER — CLINDAMYCIN PHOSPHATE 150 MG/ML
2 INJECTION, SOLUTION INTRAVENOUS
Qty: 42 | Refills: 0
Start: 2024-10-04 | End: 2024-10-10

## 2024-10-04 RX ORDER — BISACODYL 5 MG/1
1 TABLET, COATED ORAL
Qty: 10 | Refills: 0
Start: 2024-10-04 | End: 2024-10-13

## 2024-10-04 RX ORDER — LEVALBUTEROL HYDROCHLORIDE 1.25 MG/3ML
0 SOLUTION RESPIRATORY (INHALATION)
Qty: 0 | Refills: 0 | DISCHARGE

## 2024-10-04 RX ORDER — LEVALBUTEROL HYDROCHLORIDE 1.25 MG/3ML
3 SOLUTION RESPIRATORY (INHALATION)
Qty: 0 | Refills: 0 | DISCHARGE
Start: 2024-10-04

## 2024-10-04 RX ORDER — CEFPODOXIME PROXETIL 50 MG/5 ML
1 SUSPENSION, RECONSTITUTED, ORAL (ML) ORAL
Qty: 14 | Refills: 0
Start: 2024-10-04 | End: 2024-10-10

## 2024-10-04 RX ORDER — MICONAZOLE NITRATE 2 G/100G
1 OINTMENT TOPICAL
Qty: 1 | Refills: 0
Start: 2024-10-04 | End: 2024-10-10

## 2024-10-04 RX ORDER — SACCHAROMYCES BOULARDII 50 MG
250 CAPSULE ORAL
Refills: 0 | Status: DISCONTINUED | OUTPATIENT
Start: 2024-10-04 | End: 2024-10-04

## 2024-10-04 RX ORDER — ANTI-ITCH CREAM 1 G/100G
1 OINTMENT TOPICAL
Qty: 1 | Refills: 0
Start: 2024-10-04 | End: 2024-10-10

## 2024-10-04 RX ORDER — SACCHAROMYCES BOULARDII 50 MG
1 CAPSULE ORAL
Qty: 14 | Refills: 0
Start: 2024-10-04 | End: 2024-10-10

## 2024-10-04 RX ORDER — ANTI-ITCH CREAM 1 G/100G
2 OINTMENT TOPICAL
Qty: 56 | Refills: 0
Start: 2024-10-04 | End: 2024-10-17

## 2024-10-04 RX ORDER — BISACODYL 5 MG/1
10 TABLET, COATED ORAL DAILY
Refills: 0 | Status: DISCONTINUED | OUTPATIENT
Start: 2024-10-04 | End: 2024-10-04

## 2024-10-04 RX ORDER — ACETAMINOPHEN 325 MG
2 TABLET ORAL
Qty: 0 | Refills: 0 | DISCHARGE
Start: 2024-10-04

## 2024-10-04 RX ADMIN — NEBIVOLOL 5 MILLIGRAM(S): 2.5 TABLET ORAL at 06:09

## 2024-10-04 RX ADMIN — Medication 100 MILLIGRAM(S): at 17:07

## 2024-10-04 RX ADMIN — Medication 17 GRAM(S): at 10:06

## 2024-10-04 RX ADMIN — CLINDAMYCIN PHOSPHATE 100 MILLIGRAM(S): 150 INJECTION, SOLUTION INTRAVENOUS at 06:09

## 2024-10-04 RX ADMIN — CLINDAMYCIN PHOSPHATE 100 MILLIGRAM(S): 150 INJECTION, SOLUTION INTRAVENOUS at 19:25

## 2024-10-04 RX ADMIN — Medication 250 MILLIGRAM(S): at 17:08

## 2024-10-04 RX ADMIN — CLINDAMYCIN PHOSPHATE 100 MILLIGRAM(S): 150 INJECTION, SOLUTION INTRAVENOUS at 14:06

## 2024-10-04 NOTE — DISCHARGE NOTE PROVIDER - NSDCCPCAREPLAN_GEN_ALL_CORE_FT
PRINCIPAL DISCHARGE DIAGNOSIS  Diagnosis: Proctocolitis  Assessment and Plan of Treatment: Please take all medications as prescribed and follow with your primary care doctor in 1-2 weeks.      SECONDARY DISCHARGE DIAGNOSES  Diagnosis: Multiple allergies  Assessment and Plan of Treatment: You should follow with an allergy specialist after discharge as you have had reactions to several different medications.  Please call the number provided for an appointment.

## 2024-10-04 NOTE — DISCHARGE NOTE PROVIDER - PROVIDER TOKENS
PROVIDER:[TOKEN:[[id number]]],PROVIDER:[TOKEN:[[id number]]],PROVIDER:[TOKEN:[[id number]]]

## 2024-10-04 NOTE — DISCHARGE NOTE PROVIDER - NSDCFUSCHEDAPPT_GEN_ALL_CORE_FT
Loy Truong  [facility] Physician [facility]  GASTRO Doc Off [id number]  Scheduled Appointment: 10/28/2024    [facility]  CARDIOLOGY [address]  Scheduled Appointment: 12/13/2024    Sly Ramsay  [facility]  PULMMED [address]  Scheduled Appointment: 12/23/2024     +facial symmetry/cranial nerves 2-12 intact

## 2024-10-04 NOTE — DISCHARGE NOTE PROVIDER - CARE PROVIDERS DIRECT ADDRESSES
[email or web address],DirectAddress_Unknown,DirectAddress_Unknown

## 2024-10-04 NOTE — PROGRESS NOTE ADULT - ASSESSMENT
59yFemale pmh DM on Mounjaro last dose last week, HLD, IPMN, splenomegaly, recent COVID-19 presents for rectal pain after 3 days of constipation, patient tried to manually disimpact herself and developed severe pain and noticed a few drops of blood. Patient with history of Colonoscopy as per patient that showed internal hemorrhoids.    #Rectal pain, Constipation  #weight loss from mounjaro  #Mildly thickened rectal wall with surrounding inflammatory changes,   may reflect acute proctocolitis. Underlying mass within the rectum cannot   be excluded. Follow-up recommended.  10/2/24-erythema noted in perianal area, no stool burden in finger's reach, no blood in stool noted  Rec  -miralax BID-TID, do not give senna any further patient allergic  -anusol cream  -consider risks and benefits of mounjaro   -patient on ceftriaxone and clindamycin   -patient to followup with Dr. Granado for outpatient Colonoscopy, patient to bring Colonoscopy records from last year  - Follow up with our GI office located on [address] [place] [address], Phone number [phone] with Dr. Granado 10/28/24    #Diffuse hepatic steatosis  Rec  - patient seeing Dr. Alarcon, outpatient   - Follow up with our GI office located on [address] [place] [address], Phone number [phone] with Dr. Alarcon    #pancreatic cyst possible IPMN history  Rec  -If inpatient possible consider inpatient MRI with and without contrast with MRCP views, patient has outpatient script  -Follow up with our GI office located on [address] [place] [address], Phone number [phone] with Dr. Granado 10/28/24     59yFemale pmh DM on Mounjaro last dose last week, HLD, IPMN, splenomegaly, recent COVID-19 presents for rectal pain after 3 days of constipation, patient tried to manually disimpact herself and developed severe pain and noticed a few drops of blood. Patient with history of Colonoscopy as per patient that showed internal hemorrhoids.    #Rectal pain, Constipation  #weight loss from Mounjaro  #Mildly thickened rectal wall with surrounding inflammatory changes,   may reflect acute proctocolitis. Underlying mass within the rectum cannot   be excluded. Follow-up recommended.  10/2/24-erythema noted in perianal area, no stool burden in finger's reach, no blood in stool noted  Rec  -Miralax BID-TID, do not give senna any further patient allergic  -Hydrocortisone suppositories   -Anusol cream  -Can check GI PCR given loose stools  -Consider risks and benefits of Mounjaro   -patient on ceftriaxone and clindamycin   -patient to followup with Dr. Granado for outpatient Colonoscopy, patient to bring Colonoscopy records from last year  - Follow up with our GI office located on [address] [place] [address], Phone number [phone] with Dr. Granado 10/28/24    #Diffuse hepatic steatosis  Rec  - patient seeing Dr. Alarcon, outpatient   - Follow up with our GI office located on [address] [place] [address], Phone number [phone] with Dr. Alarcon    #pancreatic cyst possible IPMN history  Rec  -If inpatient possible consider inpatient MRI with and without contrast with MRCP views, patient has outpatient script  -Follow up with our GI office located on [address] [place] [address], Phone number [phone] with Dr. Granado 10/28/24     59yFemale pmh DM on Mounjaro last dose last week, HLD, IPMN, splenomegaly, recent COVID-19 presents for rectal pain after 3 days of constipation, patient tried to manually disimpact herself and developed severe pain and noticed a few drops of blood. Patient with history of Colonoscopy as per patient that showed internal hemorrhoids.    #Rectal pain, Constipation  #weight loss from Mounjaro  #Mildly thickened rectal wall with surrounding inflammatory changes,   may reflect acute proctocolitis. Underlying mass within the rectum cannot   be excluded. Follow-up recommended.  10/2/24-erythema noted in perianal area, no stool burden in finger's reach, no blood in stool noted  Rec  -Miralax BID-TID, do not give senna any further patient allergic  -Hydrocortisone suppositories   -Anusol cream  -Can check GI PCR to rule out infectious colitis though less likely   -Consider risks and benefits of Mounjaro   -patient on ceftriaxone and clindamycin   -patient will followup with Dr. Granado for outpatient Colonoscopy, has an appointment with him this month, patient to bring Colonoscopy records from last year  - Follow up with our GI office located on [address] [place] [address], Phone number [phone] with Dr. Granado 10/28/24    #Diffuse hepatic steatosis  Rec  - patient seeing Dr. Alarcon, outpatient   - Follow up with our GI office located on [address] [place] [address], Phone number [phone] with Dr. Alarcon    #pancreatic cyst possible IPMN history  Rec  -If inpatient possible consider inpatient MRI with and without contrast with MRCP views, patient has outpatient script  -Follow up with our GI office located on [address] [place] [address], Phone number [phone] with Dr. Granado 10/28/24

## 2024-10-04 NOTE — PROGRESS NOTE ADULT - SUBJECTIVE AND OBJECTIVE BOX
INFECTIOUS DISEASE FOLLOW UP NOTE:    Interval History/ROS: Patient is a 59y old  Female who presents with a chief complaint of rectal pain (03 Oct 2024 14:26)    Overnight events: Had reaction to Flagyl; switch to ceftriaxone and clindamycin.    REVIEW OF SYSTEMS:  CONSTITUTIONAL: No fever or chills  HEAD: No lesion on scalp  EYES: No visual disturbance  ENT: No sore throat  RESPIRATORY: No cough, no shortness of breath  CARDIOVASCULAR: No chest pain or palpitations  GASTROINTESTINAL: No abdominal or epigastric pain; rectal pain, constipation  GENITOURINARY: No dysuria  NEUROLOGICAL: No headache/dizziness  MUSCULOSKELETAL: No joint pain, erythema, or swelling; no back pain  SKIN: No itching, rashes  All other ROS negative except noted above    Prior hospital charts reviewed [Yes]  Primary team notes reviewed [Yes]  Other consultant notes reviewed [Yes]    Allergies:  Flagyl (Rash)  latex (Rash)  senna (Rash)  sulfa drugs (Angioedema)  Sulfate (Unknown)      ANTIMICROBIALS:   cefTRIAXone   IVPB 1000 every 24 hours  clindamycin IVPB 600 every 8 hours      OTHER MEDS: MEDICATIONS  (STANDING):  acetaminophen     Tablet .. 650 every 6 hours PRN  ALPRAZolam 0.5 four times a day PRN  dextrose 50% Injectable 12.5 once  dextrose 50% Injectable 25 once  dextrose 50% Injectable 25 once  dextrose Oral Gel 15 once PRN  diphenhydrAMINE 25 every 6 hours PRN  diphenhydrAMINE Injectable 25 every 6 hours PRN  fluticasone propionate/ salmeterol 100-50 MICROgram(s) Diskus 1 two times a day  glucagon  Injectable 1 once  HYDROmorphone  Injectable 0.5 every 8 hours PRN  insulin lispro (ADMELOG) corrective regimen sliding scale  three times a day before meals  insulin lispro (ADMELOG) corrective regimen sliding scale  at bedtime  levalbuterol Inhalation 1.25 every 6 hours PRN  melatonin 3 at bedtime PRN  nebivolol 5 daily  ondansetron Injectable 4 every 8 hours PRN  polyethylene glycol 3350 17 two times a day PRN      Vital Signs Last 24 Hrs  T(F): 97.4 (10-04-24 @ 04:31), Max: 101 (10-01-24 @ 15:09)    Vital Signs Last 24 Hrs  HR: 83 (10-04-24 @ 04:31) (83 - 91)  BP: 111/72 (10-04-24 @ 04:31) (111/72 - 130/79)  RR: 18 (10-04-24 @ 04:31)  SpO2: 85% (10-03-24 @ 20:52) (85% - 97%)  Wt(kg): --    EXAM:  GENERAL: NAD, lying in bed  HEAD: No head lesions  EYES: Conjunctiva pink and cornea white  EAR, NOSE, MOUTH, THROAT: Normal external ears and nose, no discharges; moist mucous membranes  NECK: Supple, nontender to palpation; no JVD  RESPIRATORY: Clear to auscultation bilaterally  CARDIOVASCULAR: S1 S2  GASTROINTESTINAL: Soft, nontender, nondistended; normoactive bowel sounds  GENITOURINARY: No gallardo catheter, no CVA tenderness  EXTREMITIES: No clubbing, cyanosis, or petal edema  NERVOUS SYSTEM: Alert and oriented to person, time, place and situation, speech clear. No focal deficits   MUSCULOSKELETAL: No joint erythema, swelling or pain  SKIN: Faint maculopapular rash on abdomen and back, no superficial thrombophlebitis  PSYCH: Normal affect    Labs:                        13.2   6.09  )-----------( 193      ( 04 Oct 2024 06:46 )             38.5     10-04    142  |  104  |  12  ----------------------------<  107[H]  3.8   |  28  |  0.8    Ca    8.7      04 Oct 2024 06:46    TPro  6.1  /  Alb  3.9  /  TBili  0.4  /  DBili  x   /  AST  25  /  ALT  34  /  AlkPhos  65  10-04      WBC Trend:  WBC Count: 6.09 (10-04-24 @ 06:46)  WBC Count: 4.57 (10-03-24 @ 05:47)  WBC Count: 7.44 (10-02-24 @ 05:47)  WBC Count: 16.36 (10-01-24 @ 15:16)      Creatine Trend:  Creatinine: 0.8 (10-04)  Creatinine: 0.8 (10-03)  Creatinine: 0.9 (10-02)  Creatinine: 0.9 (10-01)      Liver Biochemical Testing Trend:  Alanine Aminotransferase (ALT/SGPT): 34 (10-04)  Alanine Aminotransferase (ALT/SGPT): 39 (10-03)  Alanine Aminotransferase (ALT/SGPT): 39 (10-01)  Alanine Aminotransferase (ALT/SGPT): 39 (11-22)  Alanine Aminotransferase (ALT/SGPT): 33 (11-21)  Aspartate Aminotransferase (AST/SGOT): 25 (10-04-24 @ 06:46)  Aspartate Aminotransferase (AST/SGOT): 25 (10-03-24 @ 05:47)  Aspartate Aminotransferase (AST/SGOT): 29 (10-01-24 @ 15:16)  Aspartate Aminotransferase (AST/SGOT): 24 (11-22-23 @ 07:12)  Aspartate Aminotransferase (AST/SGOT): 16 (11-21-23 @ 07:31)  Bilirubin Total: 0.4 (10-04)  Bilirubin Total: 0.3 (10-03)  Bilirubin Total: 0.6 (10-01)  Bilirubin Total: 0.3 (11-22)  Bilirubin Total: 0.3 (11-21)      Trend LDH      Urinalysis Basic - ( 04 Oct 2024 06:46 )    Color: x / Appearance: x / SG: x / pH: x  Gluc: 107 mg/dL / Ketone: x  / Bili: x / Urobili: x   Blood: x / Protein: x / Nitrite: x   Leuk Esterase: x / RBC: x / WBC x   Sq Epi: x / Non Sq Epi: x / Bacteria: x        MICROBIOLOGY:        Culture - Urine (collected 01 Oct 2024 18:00)  Source: Clean Catch None  Final Report:    <10,000 CFU/mL Normal Urogenital Gina    Urinalysis with Rflx Culture (collected 01 Oct 2024 18:00)    Culture - Blood (collected 01 Oct 2024 15:16)  Source: .Blood BLOOD  Preliminary Report:    No growth at 48 Hours    Culture - Blood (collected 01 Oct 2024 15:16)  Source: .Blood BLOOD  Preliminary Report:    No growth at 48 Hours    Culture - Sputum (collected 20 Nov 2023 11:00)  Source: .Sputum Sputum  Final Report:    Normal Respiratory Gina present    Culture - Acid Fast - Sputum w/Smear (collected 20 Nov 2023 11:00)  Source: .Sputum Sputum  Final Report:    No acid-fast bacilli isolated after 6 weeks.    Lactate, Blood: 1.5 (10-01 @ 15:16)      RADIOLOGY:  imaging below personally reviewed    < from: CT Abdomen and Pelvis w/ IV Cont (10.01.24 @ 16:56) >  IMPRESSION:    1.  Mildly thickened rectal wall with surrounding inflammatory changes,   may reflect acute proctocolitis. Underlying mass within the rectum cannot   be excluded. Follow-up recommended.    < end of copied text >

## 2024-10-04 NOTE — PROGRESS NOTE ADULT - TIME BILLING
I have personally seen and examined this patient.    I have reviewed all pertinent clinical information and reviewed all relevant imaging and diagnostic studies personally.   I discussed recommendations with the primary team.
coordination of care
Coordination of care

## 2024-10-04 NOTE — DISCHARGE NOTE NURSING/CASE MANAGEMENT/SOCIAL WORK - NSDCPEFALRISK_GEN_ALL_CORE
For information on Fall & Injury Prevention, visit: [email or web address] OR  [email or web address] OR  https://www.cdc.gov/steadi/patient.html

## 2024-10-04 NOTE — DISCHARGE NOTE PROVIDER - NSDCMRMEDTOKEN_GEN_ALL_CORE_FT
LEVALBUTEROL TAR HFA 45MCG INH:   MOUNJARO 5MG/0.5ML INJ (4 PENS): INJECT 5 MG UNDER THE SKIN ONCE A WEEK  nebivolol 5 mg oral tablet: 1 tab(s) orally once a day  Symbicort 80 mcg-4.5 mcg/inh inhalation aerosol: 2 puff(s) inhaled 2 times a day   acetaminophen 325 mg oral tablet: 2 tab(s) orally every 6 hours As needed Temp greater or equal to 38C (100.4F), Mild Pain (1 - 3)  Anusol-HC 2.5% topical cream: Apply topically to affected area 3 times a day  cefpodoxime 200 mg oral tablet: 1 tab(s) orally every 12 hours  Cleocin HCl 300 mg oral capsule: 2 cap(s) orally every 8 hours  Dulcolax Laxative 5 mg oral delayed release tablet: 1 tab(s) orally once a day  Florastor 250 mg oral capsule: 1 cap(s) orally 2 times a day  hydrocortisone 25 mg rectal suppository: 2 suppository(ies) rectally 2 times a day as needed for  moderate pain  levalbuterol 1.25 mg/3 mL inhalation solution: 3 milliliter(s) inhaled every 6 hours As needed SOB and Wheezing  Miconazole 7 vaginal cream with applicator: 1 applicatorful intravaginally once a day (at bedtime)  MiraLax oral powder for reconstitution: 17 gram(s) orally 3 times a day as needed for  constipation  MOUNJARO 5MG/0.5ML INJ (4 PENS): INJECT 5 MG UNDER THE SKIN ONCE A WEEK  nebivolol 5 mg oral tablet: 1 tab(s) orally once a day  nystatin 100,000 units/mL oral suspension: 4 milliliter(s) orally 4 times a day  Symbicort 80 mcg-4.5 mcg/inh inhalation aerosol: 2 puff(s) inhaled 2 times a day

## 2024-10-04 NOTE — PROGRESS NOTE ADULT - NS ATTEND AMEND GEN_ALL_CORE FT
60yo female with rectal pain and constipation with evidence of proctitis on imaging, possibly stercoral colitis need to r/o other etiologies. Continue bowel regimen. Monitor bms. Recommend close outpt follow up for colonoscopy.
60yo female with rectal pain and constipation with evidence of proctitis on imaging, possibly stercoral colitis need to r/o other etiologies including colon mass given CT abdomen and pelvis findings. Symptoms clinically improving. Continue bowel regimen. Monitor BMs. Patient will follow up with dr. Roman for colonoscopy, has an appointment this month. Patient to be off Mounjaro for two weeks prior to colonoscopy. Rest of recommendations as above

## 2024-10-04 NOTE — DISCHARGE NOTE NURSING/CASE MANAGEMENT/SOCIAL WORK - PATIENT PORTAL LINK FT
41.3 week sob visit bpp ultrasound today   You can access the FollowMyHealth Patient Portal offered by [facility] by registering at the following website: [email or web address]. By joining [facility]’s FollowMyHealth portal, you will also be able to view your health information using other applications (apps) compatible with our system.

## 2024-10-04 NOTE — DISCHARGE NOTE PROVIDER - HOSPITAL COURSE
Admission HPI:  58 y/o F PMHx  DM2, HLD, IPMN, splenomegaly, and reactive airway disease c/o 1 day h/o severe rectal pain, pain w/ urination, & fever and nausea and 3 day h/o constipation. Pt reports she woke up feeling severe rectal pain and pelvic pressure, w/ feeling like she needed to pass BM, but unable to do despite trying for 3 hours. She noted she attempted disimpaction, but made pain worse, stating she also noted a few drops of bright red blood at the time, but attributed to her h/o hemorrhoids Pt reports 101 temp ~1:30 today, prompting ED visit          # Sepsis ( 101 temp, tachy, and elevated WBC in ED)  #Proctocolitis  #H/O IPMN  CTAP:   Mildly thickened rectal wall with surrounding inflammatory changes, may reflect acute proctocolitis. Underlying mass within the rectum cannot be excluded. Follow-up recommended.  s/p CTX and flagyl in ED w/ 1 L fluid  -Was treated with Clinda and Rocephin while inpatient (had allergic reaction to Flagyl)  - GI and ID consult appreciated ( f/u with Dr. Dash Alan outpt)  - Discharge on PO Vantin 200mg q12hrs and PO Clindamycin 600mg q8hrs to complete 7-day total treatment Admission HPI:  60 y/o F PMHx  DM2, HLD, IPMN, splenomegaly, and reactive airway disease c/o 1 day h/o severe rectal pain, pain w/ urination, & fever and nausea and 3 day h/o constipation. Pt reports she woke up feeling severe rectal pain and pelvic pressure, w/ feeling like she needed to pass BM, but unable to do despite trying for 3 hours. She noted she attempted disimpaction, but made pain worse, stating she also noted a few drops of bright red blood at the time, but attributed to her h/o hemorrhoids Pt reports 101 temp ~1:30 today, prompting ED visit          # Sepsis ( 101 temp, tachy, and elevated WBC in ED)  #Proctocolitis  #H/O IPMN  CTAP:   Mildly thickened rectal wall with surrounding inflammatory changes, may reflect acute proctocolitis. Underlying mass within the rectum cannot be excluded. Follow-up recommended.  s/p CTX and flagyl in ED w/ 1 L fluid  -Was treated with IV Clinda and Rocephin while inpatient (had allergic reaction to Flagyl)  - GI and ID consult appreciated ( f/u with Dr. Dash Alan outpt)  - Discharge on PO Vantin 200mg q12hrs and PO Clindamycin 600mg q8hrs to complete 10-day total treatment  -Miralax BID-TID, do not give senna any further patient allergic  -Hydrocortisone suppositories   -Anusol cream  -patient will followup with Dr. Granado for outpatient Colonoscopy, has an appointment with him this month  -Will need MRI with and without contrast with MRCP views, patient has outpatient script for IPMN      Thrush:  Nystatin swish and swallow    Multiple allergies:  Patient states she has had hypersensitive reactions to several medications in the past  -Advised to follow with allergist/immunologist as outpatient

## 2024-10-04 NOTE — PROGRESS NOTE ADULT - SUBJECTIVE AND OBJECTIVE BOX
59yFemale  Being followed for rectal pain  Interval history: Patient denies nausea, vomiting, hematemesis, melena, blood in stool, diarrhea, constipation, abdominal pain. Patient with small bm last night. patient reports she is still having some rectal pain.      PAST MEDICAL & SURGICAL HISTORY:   HTN (hypertension)      HLD (hyperlipidemia)      Fatty liver      Asthma      S/P abdominoplasty      S/P bilateral breast reduction                Social History: No smoking. No alcohol. No illegal drug use.            MEDICATIONS  (STANDING):  cefTRIAXone   IVPB 1000 milliGRAM(s) IV Intermittent every 24 hours  clindamycin IVPB 600 milliGRAM(s) IV Intermittent every 8 hours  dextrose 5%. 1000 milliLiter(s) (50 mL/Hr) IV Continuous <Continuous>  dextrose 5%. 1000 milliLiter(s) (100 mL/Hr) IV Continuous <Continuous>  dextrose 50% Injectable 12.5 Gram(s) IV Push once  dextrose 50% Injectable 25 Gram(s) IV Push once  dextrose 50% Injectable 25 Gram(s) IV Push once  fluticasone propionate/ salmeterol 100-50 MICROgram(s) Diskus 1 Dose(s) Inhalation two times a day  glucagon  Injectable 1 milliGRAM(s) IntraMuscular once  insulin lispro (ADMELOG) corrective regimen sliding scale   SubCutaneous three times a day before meals  insulin lispro (ADMELOG) corrective regimen sliding scale   SubCutaneous at bedtime  lactated ringers. 1000 milliLiter(s) (75 mL/Hr) IV Continuous <Continuous>  nebivolol 5 milliGRAM(s) Oral daily  polyethylene glycol 3350 17 Gram(s) Oral daily  saccharomyces boulardii 250 milliGRAM(s) Oral two times a day    MEDICATIONS  (PRN):  acetaminophen     Tablet .. 650 milliGRAM(s) Oral every 6 hours PRN Temp greater or equal to 38C (100.4F), Mild Pain (1 - 3)  ALPRAZolam 0.5 milliGRAM(s) Oral four times a day PRN anxiety  dextrose Oral Gel 15 Gram(s) Oral once PRN Blood Glucose LESS THAN 70 milliGRAM(s)/deciliter  diphenhydrAMINE 25 milliGRAM(s) Oral every 6 hours PRN Rash and/or Itching  diphenhydrAMINE Injectable 25 milliGRAM(s) IV Push every 6 hours PRN Allergy symptoms  HYDROmorphone  Injectable 0.5 milliGRAM(s) IV Push every 8 hours PRN Severe Pain (7 - 10)  levalbuterol Inhalation 1.25 milliGRAM(s) Inhalation every 6 hours PRN SOB and Wheezing  melatonin 3 milliGRAM(s) Oral at bedtime PRN Insomnia  ondansetron Injectable 4 milliGRAM(s) IV Push every 8 hours PRN Nausea and/or Vomiting      Allergies:   Flagyl (Rash)  latex (Rash)  senna (Rash)  sulfa drugs (Angioedema)  Sulfate (Unknown)  Intolerances          REVIEW OF SYSTEMS:  General:  No weight loss, fevers, or chills.  Eyes:  No reported pain or visual changes  ENT:  No sore throat or runny nose.  NECK: No stiffness   CV:  No chest pain or palpitations.  Resp:  No shortness of breath, cough  GI:  No abdominal pain, nausea, vomiting, dysphagia, diarrhea or constipation. No rectal bleeding, melena, or hematemesis.  Muscle:  No aches or weakness  Neuro:  No tingling, numbness           VITAL SIGNS:   T(F): 97.4 (10-04-24 @ 04:31), Max: 98.7 (10-03-24 @ 14:17)  HR: 83 (10-04-24 @ 04:31) (83 - 91)  BP: 111/72 (10-04-24 @ 04:31) (111/72 - 130/79)  RR: 18 (10-04-24 @ 04:31) (18 - 18)  SpO2: 85% (10-03-24 @ 20:52) (85% - 97%)    PHYSICAL EXAM:  GENERAL: AAOx3, no acute distress.  HEAD:  Atraumatic, Normocephalic  EYES: conjunctiva and sclera clear  NECK: Supple, no JVD or thyromegaly  CHEST/LUNG: Clear to auscultation bilaterally; No wheeze, rhonchi, or rales  HEART: Regular rate and rhythm; normal S1, S2, No murmurs.  ABDOMEN: Soft, nontender, nondistended; Bowel sounds present  NEUROLOGY: No asterixis or tremor.   SKIN: Intact, no jaundice            LABS:                        13.2   6.09  )-----------( 193      ( 04 Oct 2024 06:46 )             38.5     10-04    142  |  104  |  12  ----------------------------<  107[H]  3.8   |  28  |  0.8    Ca    8.7      04 Oct 2024 06:46    TPro  6.1  /  Alb  3.9  /  TBili  0.4  /  DBili  x   /  AST  25  /  ALT  34  /  AlkPhos  65  10-04    LIVER FUNCTIONS - ( 04 Oct 2024 06:46 )  Alb: 3.9 g/dL / Pro: 6.1 g/dL / ALK PHOS: 65 U/L / ALT: 34 U/L / AST: 25 U/L / GGT: x               IMAGING:    < from: CT Abdomen and Pelvis w/ IV Cont (10.01.24 @ 16:56) >    ACC: [id number] EXAM:  CT ABDOMEN AND PELVIS IC   ORDERED BY: SAMM WAGNER     PROCEDURE DATE:  10/01/2024          INTERPRETATION:  CLINICAL INFORMATION: Lower abdominal pain,   constipation, fever, rectal bleeding.    COMPARISON: None.    CONTRAST:  IV Contrast: Omnipaque 350  90 cc administered   10 cc discarded  Oral Contrast: NONE    PROCEDURE:  CT of the Abdomen and Pelvis was performed.  Sagittal and coronal reformats were performed.    FINDINGS:  LOWER CHEST: Bibasilar segmental atelectasis.    LIVER: Diffuse hepatic steatosis. Subcentimeter hypodensity in the right   hepatic lobe, too small to further characterize.  BILE DUCTS: Normal caliber.  GALLBLADDER: Within normal limits.  SPLEEN: Within normal limits.  PANCREAS: Within normal limits.  ADRENALS: Within normal limits.  KIDNEYS/URETERS: Symmetric renal enhancement. No hydronephrosis.    BLADDER: Within normal limits.  REPRODUCTIVE ORGANS: Status post hysterectomy.    BOWEL: Mildly thickened rectal wall with surrounding inflammatory changes   and fat stranding. No bowel obstruction or pneumoperitoneum. Colonic   diverticulosis without evidence of acute diverticulitis. Appendix is   normal.  PERITONEUM/RETROPERITONEUM: Within normal limits.  VESSELS: Mild atherosclerosis changes of the aorta and its branches.  LYMPH NODES: No lymphadenopathy.  ABDOMINAL WALL: Within normal limits.  BONES: Mild degenerative changes of the spine. Scoliosis    IMPRESSION:    1.  Mildly thickened rectal wall with surrounding inflammatory changes,   may reflect acute proctocolitis. Underlying mass within the rectum cannot   be excluded. Follow-up recommended.    --- End of Report ---          DI STONER MD; Resident Radiologist  This document has been electronically signed.  IZABEL WILL MD; Attending Radiologist  This document has been electronically signed. Oct  1 2024  6:47PM    < end of copied text >         59yFemale  Being followed for rectal pain  Interval history: Patient denies nausea, vomiting, hematemesis, melena, blood in stool, diarrhea, constipation, abdominal pain. Patient with three bowel movements today, patient reports she is still having some rectal pain.      PAST MEDICAL & SURGICAL HISTORY:   HTN (hypertension)      HLD (hyperlipidemia)      Fatty liver      Asthma      S/P abdominoplasty      S/P bilateral breast reduction                Social History: No smoking. No alcohol. No illegal drug use.            MEDICATIONS  (STANDING):  cefTRIAXone   IVPB 1000 milliGRAM(s) IV Intermittent every 24 hours  clindamycin IVPB 600 milliGRAM(s) IV Intermittent every 8 hours  dextrose 5%. 1000 milliLiter(s) (50 mL/Hr) IV Continuous <Continuous>  dextrose 5%. 1000 milliLiter(s) (100 mL/Hr) IV Continuous <Continuous>  dextrose 50% Injectable 12.5 Gram(s) IV Push once  dextrose 50% Injectable 25 Gram(s) IV Push once  dextrose 50% Injectable 25 Gram(s) IV Push once  fluticasone propionate/ salmeterol 100-50 MICROgram(s) Diskus 1 Dose(s) Inhalation two times a day  glucagon  Injectable 1 milliGRAM(s) IntraMuscular once  insulin lispro (ADMELOG) corrective regimen sliding scale   SubCutaneous three times a day before meals  insulin lispro (ADMELOG) corrective regimen sliding scale   SubCutaneous at bedtime  lactated ringers. 1000 milliLiter(s) (75 mL/Hr) IV Continuous <Continuous>  nebivolol 5 milliGRAM(s) Oral daily  polyethylene glycol 3350 17 Gram(s) Oral daily  saccharomyces boulardii 250 milliGRAM(s) Oral two times a day    MEDICATIONS  (PRN):  acetaminophen     Tablet .. 650 milliGRAM(s) Oral every 6 hours PRN Temp greater or equal to 38C (100.4F), Mild Pain (1 - 3)  ALPRAZolam 0.5 milliGRAM(s) Oral four times a day PRN anxiety  dextrose Oral Gel 15 Gram(s) Oral once PRN Blood Glucose LESS THAN 70 milliGRAM(s)/deciliter  diphenhydrAMINE 25 milliGRAM(s) Oral every 6 hours PRN Rash and/or Itching  diphenhydrAMINE Injectable 25 milliGRAM(s) IV Push every 6 hours PRN Allergy symptoms  HYDROmorphone  Injectable 0.5 milliGRAM(s) IV Push every 8 hours PRN Severe Pain (7 - 10)  levalbuterol Inhalation 1.25 milliGRAM(s) Inhalation every 6 hours PRN SOB and Wheezing  melatonin 3 milliGRAM(s) Oral at bedtime PRN Insomnia  ondansetron Injectable 4 milliGRAM(s) IV Push every 8 hours PRN Nausea and/or Vomiting      Allergies:   Flagyl (Rash)  latex (Rash)  senna (Rash)  sulfa drugs (Angioedema)  Sulfate (Unknown)  Intolerances          REVIEW OF SYSTEMS:  General:  No weight loss, fevers, or chills.  Eyes:  No reported pain or visual changes  ENT:  No sore throat or runny nose.  NECK: No stiffness   CV:  No chest pain or palpitations.  Resp:  No shortness of breath, cough  GI:  No abdominal pain, nausea, vomiting, dysphagia, diarrhea or constipation. No rectal bleeding, melena, or hematemesis.            VITAL SIGNS:   T(F): 97.4 (10-04-24 @ 04:31), Max: 98.7 (10-03-24 @ 14:17)  HR: 83 (10-04-24 @ 04:31) (83 - 91)  BP: 111/72 (10-04-24 @ 04:31) (111/72 - 130/79)  RR: 18 (10-04-24 @ 04:31) (18 - 18)  SpO2: 85% (10-03-24 @ 20:52) (85% - 97%)    PHYSICAL EXAM:  GENERAL: AAOx3, no acute distress.  HEAD:  Atraumatic, Normocephalic  EYES: conjunctiva and sclera clear  NECK: Supple, no JVD or thyromegaly  CHEST/LUNG: Clear to auscultation bilaterally; No wheeze, rhonchi, or rales  HEART: Regular rate and rhythm; normal S1, S2, No murmurs.  ABDOMEN: Soft, nontender, nondistended; Bowel sounds present  NEUROLOGY: No asterixis or tremor.   SKIN: Intact, no jaundice            LABS:                        13.2   6.09  )-----------( 193      ( 04 Oct 2024 06:46 )             38.5     10-04    142  |  104  |  12  ----------------------------<  107[H]  3.8   |  28  |  0.8    Ca    8.7      04 Oct 2024 06:46    TPro  6.1  /  Alb  3.9  /  TBili  0.4  /  DBili  x   /  AST  25  /  ALT  34  /  AlkPhos  65  10-04    LIVER FUNCTIONS - ( 04 Oct 2024 06:46 )  Alb: 3.9 g/dL / Pro: 6.1 g/dL / ALK PHOS: 65 U/L / ALT: 34 U/L / AST: 25 U/L / GGT: x               IMAGING:    < from: CT Abdomen and Pelvis w/ IV Cont (10.01.24 @ 16:56) >    ACC: [id number] EXAM:  CT ABDOMEN AND PELVIS IC   ORDERED BY: SAMM WAGNER     PROCEDURE DATE:  10/01/2024          INTERPRETATION:  CLINICAL INFORMATION: Lower abdominal pain,   constipation, fever, rectal bleeding.    COMPARISON: None.    CONTRAST:  IV Contrast: Omnipaque 350  90 cc administered   10 cc discarded  Oral Contrast: NONE    PROCEDURE:  CT of the Abdomen and Pelvis was performed.  Sagittal and coronal reformats were performed.    FINDINGS:  LOWER CHEST: Bibasilar segmental atelectasis.    LIVER: Diffuse hepatic steatosis. Subcentimeter hypodensity in the right   hepatic lobe, too small to further characterize.  BILE DUCTS: Normal caliber.  GALLBLADDER: Within normal limits.  SPLEEN: Within normal limits.  PANCREAS: Within normal limits.  ADRENALS: Within normal limits.  KIDNEYS/URETERS: Symmetric renal enhancement. No hydronephrosis.    BLADDER: Within normal limits.  REPRODUCTIVE ORGANS: Status post hysterectomy.    BOWEL: Mildly thickened rectal wall with surrounding inflammatory changes   and fat stranding. No bowel obstruction or pneumoperitoneum. Colonic   diverticulosis without evidence of acute diverticulitis. Appendix is   normal.  PERITONEUM/RETROPERITONEUM: Within normal limits.  VESSELS: Mild atherosclerosis changes of the aorta and its branches.  LYMPH NODES: No lymphadenopathy.  ABDOMINAL WALL: Within normal limits.  BONES: Mild degenerative changes of the spine. Scoliosis    IMPRESSION:    1.  Mildly thickened rectal wall with surrounding inflammatory changes,   may reflect acute proctocolitis. Underlying mass within the rectum cannot   be excluded. Follow-up recommended.    --- End of Report ---          DI STONER MD; Resident Radiologist  This document has been electronically signed.  IZABEL SOLIS MD; Attending Radiologist  This document has been electronically signed. Oct  1 2024  6:47PM    < end of copied text >

## 2024-10-04 NOTE — DISCHARGE NOTE PROVIDER - CARE PROVIDER_API CALL
Shea Ferguson  Allergy and Immunology  [address] [place] [address]  Phone: [phone]  Fax: [phone]  Follow Up Time:     Ann Del Valle  Internal Medicine  [facility] [place] [address]  Phone: [phone]  Fax: [phone]  Follow Up Time:     Loy Truong  Gastroenterology  [address] [place] [address]  Phone: [phone]  Fax: [phone]  Follow Up Time:

## 2024-10-07 ENCOUNTER — APPOINTMENT (OUTPATIENT)
Dept: PULMONOLOGY | Facility: CLINIC | Age: 59
End: 2024-10-07

## 2024-10-08 RX ORDER — BUDESONIDE AND FORMOTEROL FUMARATE DIHYDRATE 80; 4.5 UG/1; UG/1
80-4.5 AEROSOL RESPIRATORY (INHALATION)
Refills: 3 | Status: ACTIVE | COMMUNITY

## 2024-10-11 ENCOUNTER — RESULT REVIEW (OUTPATIENT)
Age: 59
End: 2024-10-11

## 2024-10-11 ENCOUNTER — OUTPATIENT (OUTPATIENT)
Dept: OUTPATIENT SERVICES | Facility: HOSPITAL | Age: 59
LOS: 1 days | End: 2024-10-11
Payer: COMMERCIAL

## 2024-10-11 DIAGNOSIS — Z98.890 OTHER SPECIFIED POSTPROCEDURAL STATES: Chronic | ICD-10-CM

## 2024-10-11 DIAGNOSIS — K86.2 CYST OF PANCREAS: ICD-10-CM

## 2024-10-11 PROCEDURE — 74183 MRI ABD W/O CNTR FLWD CNTR: CPT

## 2024-10-11 PROCEDURE — A9579: CPT

## 2024-10-11 PROCEDURE — 45380 COLONOSCOPY AND BIOPSY: CPT | Mod: XU

## 2024-10-11 PROCEDURE — 74183 MRI ABD W/O CNTR FLWD CNTR: CPT | Mod: 26

## 2024-10-11 PROCEDURE — 45385 COLONOSCOPY W/LESION REMOVAL: CPT

## 2024-10-12 DIAGNOSIS — K86.2 CYST OF PANCREAS: ICD-10-CM

## 2024-10-28 ENCOUNTER — APPOINTMENT (OUTPATIENT)
Dept: GASTROENTEROLOGY | Facility: CLINIC | Age: 59
End: 2024-10-28
Payer: COMMERCIAL

## 2024-10-28 VITALS
WEIGHT: 169 LBS | BODY MASS INDEX: 26.53 KG/M2 | OXYGEN SATURATION: 97 % | HEART RATE: 78 BPM | HEIGHT: 67 IN | DIASTOLIC BLOOD PRESSURE: 86 MMHG | SYSTOLIC BLOOD PRESSURE: 112 MMHG

## 2024-10-28 DIAGNOSIS — R74.8 ABNORMAL LEVELS OF OTHER SERUM ENZYMES: ICD-10-CM

## 2024-10-28 DIAGNOSIS — K62.89 OTHER SPECIFIED DISEASES OF ANUS AND RECTUM: ICD-10-CM

## 2024-10-28 DIAGNOSIS — R79.89 OTHER SPECIFIED ABNORMAL FINDINGS OF BLOOD CHEMISTRY: ICD-10-CM

## 2024-10-28 DIAGNOSIS — D49.0 NEOPLASM OF UNSPECIFIED BEHAVIOR OF DIGESTIVE SYSTEM: ICD-10-CM

## 2024-10-28 PROCEDURE — 99214 OFFICE O/P EST MOD 30 MIN: CPT

## 2024-11-04 DIAGNOSIS — Z12.11 ENCOUNTER FOR SCREENING FOR MALIGNANT NEOPLASM OF COLON: ICD-10-CM

## 2024-11-04 RX ORDER — POLYETHYLENE GLYCOL 3350 17 G/17G
17 POWDER, FOR SOLUTION ORAL
Qty: 1 | Refills: 0 | Status: ACTIVE | COMMUNITY
Start: 2024-11-04 | End: 1900-01-01

## 2024-11-04 RX ORDER — SODIUM SULFATE, POTASSIUM SULFATE AND MAGNESIUM SULFATE 1.6; 3.13; 17.5 G/177ML; G/177ML; G/177ML
17.5-3.13-1.6 SOLUTION ORAL
Qty: 2 | Refills: 0 | Status: DISCONTINUED | COMMUNITY
Start: 2024-10-28 | End: 2024-11-04

## 2024-11-05 ENCOUNTER — APPOINTMENT (OUTPATIENT)
Dept: PULMONOLOGY | Facility: CLINIC | Age: 59
End: 2024-11-05
Payer: COMMERCIAL

## 2024-11-05 DIAGNOSIS — J69.0 PNEUMONITIS DUE TO INHALATION OF FOOD AND VOMIT: ICD-10-CM

## 2024-11-05 DIAGNOSIS — J45.909 UNSPECIFIED ASTHMA, UNCOMPLICATED: ICD-10-CM

## 2024-11-05 DIAGNOSIS — F41.9 ANXIETY DISORDER, UNSPECIFIED: ICD-10-CM

## 2024-11-05 DIAGNOSIS — G47.33 OBSTRUCTIVE SLEEP APNEA (ADULT) (PEDIATRIC): ICD-10-CM

## 2024-11-05 PROCEDURE — G2211 COMPLEX E/M VISIT ADD ON: CPT | Mod: NC

## 2024-11-05 PROCEDURE — 99214 OFFICE O/P EST MOD 30 MIN: CPT

## 2024-11-15 ENCOUNTER — TRANSCRIPTION ENCOUNTER (OUTPATIENT)
Age: 59
End: 2024-11-15

## 2024-11-15 ENCOUNTER — OUTPATIENT (OUTPATIENT)
Dept: OUTPATIENT SERVICES | Facility: HOSPITAL | Age: 59
LOS: 1 days | Discharge: HOME | End: 2024-11-15
Payer: COMMERCIAL

## 2024-11-15 ENCOUNTER — RESULT REVIEW (OUTPATIENT)
Age: 59
End: 2024-11-15

## 2024-11-15 VITALS
DIASTOLIC BLOOD PRESSURE: 82 MMHG | RESPIRATION RATE: 17 BRPM | HEART RATE: 87 BPM | SYSTOLIC BLOOD PRESSURE: 130 MMHG | OXYGEN SATURATION: 98 %

## 2024-11-15 VITALS
DIASTOLIC BLOOD PRESSURE: 79 MMHG | TEMPERATURE: 98 F | RESPIRATION RATE: 18 BRPM | OXYGEN SATURATION: 98 % | HEIGHT: 66 IN | SYSTOLIC BLOOD PRESSURE: 123 MMHG | WEIGHT: 169.98 LBS | HEART RATE: 77 BPM

## 2024-11-15 DIAGNOSIS — Z88.2 ALLERGY STATUS TO SULFONAMIDES: ICD-10-CM

## 2024-11-15 DIAGNOSIS — J45.909 UNSPECIFIED ASTHMA, UNCOMPLICATED: ICD-10-CM

## 2024-11-15 DIAGNOSIS — Z91.040 LATEX ALLERGY STATUS: ICD-10-CM

## 2024-11-15 DIAGNOSIS — K63.5 POLYP OF COLON: ICD-10-CM

## 2024-11-15 DIAGNOSIS — E78.5 HYPERLIPIDEMIA, UNSPECIFIED: ICD-10-CM

## 2024-11-15 DIAGNOSIS — Z98.890 OTHER SPECIFIED POSTPROCEDURAL STATES: Chronic | ICD-10-CM

## 2024-11-15 DIAGNOSIS — Z12.11 ENCOUNTER FOR SCREENING FOR MALIGNANT NEOPLASM OF COLON: ICD-10-CM

## 2024-11-15 DIAGNOSIS — K64.8 OTHER HEMORRHOIDS: ICD-10-CM

## 2024-11-15 DIAGNOSIS — Z88.1 ALLERGY STATUS TO OTHER ANTIBIOTIC AGENTS: ICD-10-CM

## 2024-11-15 DIAGNOSIS — I10 ESSENTIAL (PRIMARY) HYPERTENSION: ICD-10-CM

## 2024-11-15 DIAGNOSIS — K62.1 RECTAL POLYP: ICD-10-CM

## 2024-11-15 DIAGNOSIS — Z79.85 LONG-TERM (CURRENT) USE OF INJECTABLE NON-INSULIN ANTIDIABETIC DRUGS: ICD-10-CM

## 2024-11-15 DIAGNOSIS — E11.9 TYPE 2 DIABETES MELLITUS WITHOUT COMPLICATIONS: ICD-10-CM

## 2024-11-15 LAB — GLUCOSE BLDC GLUCOMTR-MCNC: 162 MG/DL — HIGH (ref 70–99)

## 2024-11-15 PROCEDURE — 88305 TISSUE EXAM BY PATHOLOGIST: CPT

## 2024-11-15 PROCEDURE — 88305 TISSUE EXAM BY PATHOLOGIST: CPT | Mod: 26

## 2024-11-15 PROCEDURE — 82962 GLUCOSE BLOOD TEST: CPT

## 2024-11-15 RX ORDER — ALPRAZOLAM 0.25 MG
1 TABLET ORAL
Refills: 0 | DISCHARGE

## 2024-11-15 NOTE — ASU PATIENT PROFILE, ADULT - NSICDXPASTMEDICALHX_GEN_ALL_CORE_FT
PAST MEDICAL HISTORY:  Asthma     Fatty liver     History of sleep apnea     HLD (hyperlipidemia)     HTN (hypertension)     Reactive airway disease

## 2024-11-15 NOTE — ASU DISCHARGE PLAN (ADULT/PEDIATRIC) - FINANCIAL ASSISTANCE
[facility] provides services at a reduced cost to those who are determined to be eligible through [facility]’s financial assistance program. Information regarding [facility]’s financial assistance program can be found by going to [email or web address] or by calling [phone].

## 2024-11-15 NOTE — ASU PREOP CHECKLIST - ALLERGIES REVIEWED
"Anes H&P:  PAST MEDICAL HISTORY:   59 y.o. male who presents for Procedure(s):  GASTRECTOMY, SLEEVE, LAPAROSCOPIC..  He has current and past medical problems significant for:    Past Medical History:   Diagnosis Date   • Arthritis    • Bowel habit changes    • Breath shortness 07/26/2021    Pt states \"related to weight and size\".   • Bronchitis 2020   • Hiatus hernia syndrome 2010    hernia repair-umbilical   • Hypertension    • IBS (irritable bowel syndrome)    • Lymphedema     Legs, abdomin and arms.  Uses a pump and compression hose.   • Obesity    • Pain 07/26/2021    Lymphadema pain. Neuropathy.   • Pickwickian syndrome (HCC)    • Pneumonia     In the past.   • Sleep apnea 07/26/2021    Uses CPAP.   • Snoring     RUSS-CPAP @ [facility]       SMOKING/ALCOHOL/RECREATIONAL DRUG USE:  Social History     Tobacco Use   • Smoking status: Never Smoker   • Smokeless tobacco: Never Used   Vaping Use   • Vaping Use: Never used   Substance Use Topics   • Alcohol use: Not Currently   • Drug use: Never     Social History     Substance and Sexual Activity   Drug Use Never       PAST SURGICAL HISTORY:  Past Surgical History:   Procedure Laterality Date   • OTHER  2019    Colonoscopy   • CHOLECYSTECTOMY  2010    With hernia repair.   • OTHER      Right foot - tendonitis.   • OTHER ORTHOPEDIC SURGERY      Right meniscus.       ALLERGIES:   Allergies   Allergen Reactions   • Codeine Nausea   • Latex Rash     gloves       MEDICATIONS:  No current facility-administered medications on file prior to encounter.     Current Outpatient Medications on File Prior to Encounter   Medication Sig Dispense Refill   • potassium Chloride ER (K-TAB) 20 MEQ Tab CR tablet Take 20 mEq by mouth 1 time a day as needed.         LABS:  Lab Results   Component Value Date/Time    HEMOGLOBIN 14.8 08/03/2021 1639    HEMATOCRIT 47.9 08/03/2021 1639    WBC 10.2 08/03/2021 1639     Lab Results   Component Value Date/Time    SODIUM 137 08/03/2021 1639    POTASSIUM 4.6 " 08/03/2021 1639    CHLORIDE 99 08/03/2021 1639    CO2 24 08/03/2021 1639    GLUCOSE 103 (H) 08/03/2021 1639    BUN 11 08/03/2021 1639    CALCIUM 9.1 08/03/2021 1639       COVID-19 Status: Negative      PREVIOUS ANESTHETICS: See EMR  __________________________________________      Relevant Problems   ANESTHESIA   (positive) Obstructive sleep apnea on CPAP      CARDIAC   (positive) Essential hypertension, benign       Physical Exam    Airway   Mallampati: II  TM distance: >3 FB  Neck ROM: full       Cardiovascular - normal exam  Rhythm: regular  Rate: normal  (-) murmur     Dental - normal exam           Pulmonary - normal exam  Breath sounds clear to auscultation     Abdominal    Neurological - normal exam                 Anesthesia Plan    ASA 4   ASA physical status 4 criteria: respiratory failure    Plan - general       Airway plan will be ETT          Induction: intravenous    Postoperative Plan: Postoperative administration of opioids is intended.    Pertinent diagnostic labs and testing reviewed    Informed Consent:    Anesthetic plan and risks discussed with patient.    Use of blood products discussed with: patient whom consented to blood products.          done

## 2024-11-15 NOTE — H&P PST ADULT - ENMT
[id number] [address] EMERGENCY DEPT 
[address] [facility] 
[place] [address] 
[phone] Work/School Note Date: 6/6/2021 To Whom It May concern: 
 
Desirae Parker was seen and treated today in the emergency room by the following provider(s): 
Attending Provider: Nia Velez MD 
Physician Assistant: Ina Hale, [facility] Adelina Villareal. Desirae Parker is excused from work/school on 06/06/21 and 06/07/21. She is medically clear to return to work/school on 6/8/2021.   
 
 
Sincerely, 
 
 
 
 
ALEXY Calderon  
 
 
 negative

## 2024-11-15 NOTE — ASU DISCHARGE PLAN (ADULT/PEDIATRIC) - NS MD DC FALL RISK RISK
For information on Fall & Injury Prevention, visit: [email or web address] OR  [email or web address] OR  https://www.cdc.gov/steadi/patient.html

## 2024-11-20 LAB — SURGICAL PATHOLOGY STUDY: SIGNIFICANT CHANGE UP

## 2024-12-02 ENCOUNTER — APPOINTMENT (OUTPATIENT)
Dept: GASTROENTEROLOGY | Facility: CLINIC | Age: 59
End: 2024-12-02

## 2024-12-02 DIAGNOSIS — R10.9 UNSPECIFIED ABDOMINAL PAIN: ICD-10-CM

## 2024-12-02 DIAGNOSIS — K59.09 OTHER CONSTIPATION: ICD-10-CM

## 2024-12-02 PROCEDURE — 99214 OFFICE O/P EST MOD 30 MIN: CPT

## 2024-12-02 RX ORDER — FAMOTIDINE 40 MG/1
40 TABLET, FILM COATED ORAL
Qty: 30 | Refills: 5 | Status: ACTIVE | COMMUNITY
Start: 2024-12-02 | End: 1900-01-01

## 2024-12-02 RX ORDER — POLYETHYLENE GLYCOL 3350 17 G/17G
17 POWDER, FOR SOLUTION ORAL DAILY
Qty: 510 | Refills: 11 | Status: ACTIVE | COMMUNITY
Start: 2024-12-02 | End: 1900-01-01

## 2024-12-13 ENCOUNTER — APPOINTMENT (OUTPATIENT)
Dept: CARDIOLOGY | Facility: CLINIC | Age: 59
End: 2024-12-13
Payer: COMMERCIAL

## 2024-12-13 PROCEDURE — 93880 EXTRACRANIAL BILAT STUDY: CPT

## 2024-12-17 NOTE — DISCHARGE NOTE PROVIDER - NSDCQMAMI_CARD_ALL_CORE
[FreeTextEntry1] : ELEANOR LOPEZ is a 72 year old F who presents today Dec 17, 2024 with the above history and the following active issues:  Reports last week had diarrhea, fever, and some weakness. Was unsure if she had flu or norovirus. Also has been experiencing intermittent dizziness, ?palpitations, double vision, and tingling tongue---> which she states she has had before in the setting hyperthyroidism. It seems she has been adjusting her thyroid medications; unclear if this is under a guidance of a physician. No tingling or double vision today. Fasting labs. Check TSH. Obtain 7 day live Zio monitor to determine presence of arrhythmias or significant pauses.  Obtain 2d echocardiogram to evaluate resting heart structure, valvular function, and LVEF.  Obtain carotid ultrasound to evaluate for evidence of significant carotid atherosclerosis or obstruction.  Obtain exercise stress test to assess BP response to exercise, exercise induced arrhythmias, or evidence of ischemia.  See Dr. James to review. See PCP re: the above + ROS.  Obesity. Coronary atherosclerosis. Dyslipidemia. Continue use of Zepbound. Strongly recommend to consider re-trial of statin or PCSK9 inhibitor.  Coronary calcification. Carotid atherosclerosis.  Brief PSVT. Coronary CTA nonobstructive disease. Moderate coronary calcification score Continue with metoprolol. Aspirin 81 mg daily recommended. She has not been taking it. I reminded her again today.  She understands lowering LDL cholesterol to less than 70 is important specifically with nonobstructive CAD involving proximal LAD. If there are any change in her symptoms she will contact us immediately.  PSVT. Brief. Mildly symptomatic. Controlled with metoprolol. Worsening symptoms would require electrophysiology guided therapy. At present continue with beta-blockers. Risk benefits alternatives reviewed.  Carotid atherosclerosis. Aspirin 81 mg. Consider statin or PSK( inhibitor.  Mild mitral regurgitation nonrheumatic preserved LV systolic function normal pulmonary pressures. Stable echocardiogram findings reviewed  Essential hypertension. Blood pressure well controlled on my assessment. Follow blood pressure closely. Goal less than 130/80. Low-salt diet. No alcohol. Regular walking activity as permitted by recent injury. Weight reduction.  At last OV,  "Bilateral leg fatigue and tiredness. With or without exertion. Balance abnormality. Recommended to see neurologist. Less likely to be vascular etiology. Good Equal peripheral pulses". Patient declines seeing neuro. Denies balance issues. I have again recommended neuro.   Ongoing f/u with PCP.   F/U after testing to review results. Discussed red flag symptoms, which would warrant sooner or emergent medical evaluation. Any questions and concerns were addressed and resolved.  Sincerely, Maliha Francis [facility] Patient's history, testing, and plan was reviewed with supervising physician, Dr. Patrick Valentino No

## 2024-12-31 DIAGNOSIS — K62.89 OTHER SPECIFIED DISEASES OF ANUS AND RECTUM: ICD-10-CM

## 2025-02-06 ENCOUNTER — APPOINTMENT (OUTPATIENT)
Dept: PULMONOLOGY | Facility: CLINIC | Age: 60
End: 2025-02-06
Payer: COMMERCIAL

## 2025-02-06 VITALS
SYSTOLIC BLOOD PRESSURE: 124 MMHG | BODY MASS INDEX: 27.15 KG/M2 | HEIGHT: 67 IN | WEIGHT: 173 LBS | OXYGEN SATURATION: 98 % | DIASTOLIC BLOOD PRESSURE: 82 MMHG | HEART RATE: 92 BPM

## 2025-02-06 DIAGNOSIS — G47.33 OBSTRUCTIVE SLEEP APNEA (ADULT) (PEDIATRIC): ICD-10-CM

## 2025-02-06 DIAGNOSIS — J69.0 PNEUMONITIS DUE TO INHALATION OF FOOD AND VOMIT: ICD-10-CM

## 2025-02-06 DIAGNOSIS — J45.909 UNSPECIFIED ASTHMA, UNCOMPLICATED: ICD-10-CM

## 2025-02-06 PROBLEM — Z86.69 PERSONAL HISTORY OF OTHER DISEASES OF THE NERVOUS SYSTEM AND SENSE ORGANS: Chronic | Status: ACTIVE | Noted: 2024-11-15

## 2025-02-06 PROCEDURE — 99214 OFFICE O/P EST MOD 30 MIN: CPT

## 2025-02-06 PROCEDURE — G2211 COMPLEX E/M VISIT ADD ON: CPT | Mod: NC

## 2025-02-13 ENCOUNTER — APPOINTMENT (OUTPATIENT)
Dept: UROGYNECOLOGY | Facility: CLINIC | Age: 60
End: 2025-02-13
Payer: COMMERCIAL

## 2025-02-13 VITALS
DIASTOLIC BLOOD PRESSURE: 89 MMHG | WEIGHT: 173 LBS | HEIGHT: 67 IN | HEART RATE: 91 BPM | BODY MASS INDEX: 27.15 KG/M2 | SYSTOLIC BLOOD PRESSURE: 139 MMHG

## 2025-02-13 DIAGNOSIS — M79.18 MYALGIA, OTHER SITE: ICD-10-CM

## 2025-02-13 DIAGNOSIS — R39.11 HESITANCY OF MICTURITION: ICD-10-CM

## 2025-02-13 PROCEDURE — 99215 OFFICE O/P EST HI 40 MIN: CPT | Mod: 25

## 2025-02-13 PROCEDURE — 51701 INSERT BLADDER CATHETER: CPT

## 2025-02-13 PROCEDURE — 99459 PELVIC EXAMINATION: CPT

## 2025-02-13 RX ORDER — CYCLOBENZAPRINE HYDROCHLORIDE 10 MG/1
10 TABLET, FILM COATED ORAL
Qty: 30 | Refills: 3 | Status: ACTIVE | COMMUNITY
Start: 2025-02-13 | End: 1900-01-01

## 2025-02-17 LAB — URINE CULTURE <10: NORMAL

## 2025-04-06 NOTE — PATIENT PROFILE ADULT - DO YOU LACK THE NECESSARY SUPPORT TO HELP YOU COPE WITH LIFE CHALLENGES?
patient remained stable in ED, and improved well. Patient remained awake, alert, ambulatory and comfortable, tolerated PO. Discussed with patient in detail about the need for close outpatient follow up and the need to return to ED for any persistent, or worsening symptoms, for any new symptoms/concerns. patient verbalized understanding and agreed. patient is given detail aftercare instructions and is instructed well to f/u as outpatient for further care. no

## 2025-04-14 ENCOUNTER — APPOINTMENT (OUTPATIENT)
Dept: CARDIOLOGY | Facility: CLINIC | Age: 60
End: 2025-04-14
Payer: COMMERCIAL

## 2025-04-14 VITALS
HEIGHT: 67 IN | BODY MASS INDEX: 27.62 KG/M2 | WEIGHT: 176 LBS | SYSTOLIC BLOOD PRESSURE: 133 MMHG | HEART RATE: 91 BPM | OXYGEN SATURATION: 96 % | DIASTOLIC BLOOD PRESSURE: 95 MMHG

## 2025-04-14 VITALS — SYSTOLIC BLOOD PRESSURE: 128 MMHG | DIASTOLIC BLOOD PRESSURE: 88 MMHG

## 2025-04-14 DIAGNOSIS — D64.9 ANEMIA, UNSPECIFIED: ICD-10-CM

## 2025-04-14 DIAGNOSIS — G47.33 OBSTRUCTIVE SLEEP APNEA (ADULT) (PEDIATRIC): ICD-10-CM

## 2025-04-14 DIAGNOSIS — J45.909 UNSPECIFIED ASTHMA, UNCOMPLICATED: ICD-10-CM

## 2025-04-14 DIAGNOSIS — K76.0 FATTY (CHANGE OF) LIVER, NOT ELSEWHERE CLASSIFIED: ICD-10-CM

## 2025-04-14 DIAGNOSIS — M06.9 RHEUMATOID ARTHRITIS, UNSPECIFIED: ICD-10-CM

## 2025-04-14 DIAGNOSIS — E11.9 TYPE 2 DIABETES MELLITUS W/OUT COMPLICATIONS: ICD-10-CM

## 2025-04-14 DIAGNOSIS — R79.89 OTHER SPECIFIED ABNORMAL FINDINGS OF BLOOD CHEMISTRY: ICD-10-CM

## 2025-04-14 DIAGNOSIS — E88.810 METABOLIC SYNDROME: ICD-10-CM

## 2025-04-14 DIAGNOSIS — E78.5 HYPERLIPIDEMIA, UNSPECIFIED: ICD-10-CM

## 2025-04-14 DIAGNOSIS — R07.9 CHEST PAIN, UNSPECIFIED: ICD-10-CM

## 2025-04-14 DIAGNOSIS — Z14.8 GENETIC CARRIER OF OTHER DISEASE: ICD-10-CM

## 2025-04-14 DIAGNOSIS — J69.0 PNEUMONITIS DUE TO INHALATION OF FOOD AND VOMIT: ICD-10-CM

## 2025-04-14 PROCEDURE — G2211 COMPLEX E/M VISIT ADD ON: CPT | Mod: NC

## 2025-04-14 PROCEDURE — 93000 ELECTROCARDIOGRAM COMPLETE: CPT

## 2025-04-14 PROCEDURE — 99214 OFFICE O/P EST MOD 30 MIN: CPT

## 2025-04-14 RX ORDER — CHLORHEXIDINE GLUCONATE, 0.12% ORAL RINSE 1.2 MG/ML
0.12 SOLUTION DENTAL
Qty: 946 | Refills: 0 | Status: ACTIVE | COMMUNITY
Start: 2025-01-09

## 2025-05-08 ENCOUNTER — APPOINTMENT (OUTPATIENT)
Dept: PULMONOLOGY | Facility: CLINIC | Age: 60
End: 2025-05-08

## 2025-05-29 ENCOUNTER — APPOINTMENT (OUTPATIENT)
Dept: CV DIAGNOSITCS | Facility: HOSPITAL | Age: 60
End: 2025-05-29

## 2025-06-09 ENCOUNTER — APPOINTMENT (OUTPATIENT)
Dept: UROGYNECOLOGY | Facility: CLINIC | Age: 60
End: 2025-06-09
Payer: COMMERCIAL

## 2025-06-09 VITALS
HEIGHT: 67 IN | HEART RATE: 80 BPM | WEIGHT: 178 LBS | DIASTOLIC BLOOD PRESSURE: 70 MMHG | SYSTOLIC BLOOD PRESSURE: 103 MMHG | BODY MASS INDEX: 27.94 KG/M2

## 2025-06-09 PROCEDURE — 99214 OFFICE O/P EST MOD 30 MIN: CPT | Mod: 25

## 2025-06-09 PROCEDURE — 51701 INSERT BLADDER CATHETER: CPT

## 2025-06-09 PROCEDURE — 99459 PELVIC EXAMINATION: CPT

## 2025-06-10 ENCOUNTER — OUTPATIENT (OUTPATIENT)
Dept: OUTPATIENT SERVICES | Facility: HOSPITAL | Age: 60
LOS: 1 days | End: 2025-06-10
Payer: COMMERCIAL

## 2025-06-10 ENCOUNTER — APPOINTMENT (OUTPATIENT)
Dept: CV DIAGNOSITCS | Facility: HOSPITAL | Age: 60
End: 2025-06-10
Payer: COMMERCIAL

## 2025-06-10 ENCOUNTER — RESULT REVIEW (OUTPATIENT)
Age: 60
End: 2025-06-10

## 2025-06-10 DIAGNOSIS — I10 ESSENTIAL (PRIMARY) HYPERTENSION: ICD-10-CM

## 2025-06-10 DIAGNOSIS — Z98.890 OTHER SPECIFIED POSTPROCEDURAL STATES: Chronic | ICD-10-CM

## 2025-06-10 PROCEDURE — 93306 TTE W/DOPPLER COMPLETE: CPT | Mod: 26

## 2025-06-10 PROCEDURE — 93306 TTE W/DOPPLER COMPLETE: CPT

## 2025-06-11 ENCOUNTER — OUTPATIENT (OUTPATIENT)
Dept: OUTPATIENT SERVICES | Facility: HOSPITAL | Age: 60
LOS: 1 days | Discharge: HOME | End: 2025-06-11
Payer: COMMERCIAL

## 2025-06-11 ENCOUNTER — APPOINTMENT (OUTPATIENT)
Dept: SLEEP CENTER | Facility: HOSPITAL | Age: 60
End: 2025-06-11

## 2025-06-11 DIAGNOSIS — I10 ESSENTIAL (PRIMARY) HYPERTENSION: ICD-10-CM

## 2025-06-11 DIAGNOSIS — Z98.890 OTHER SPECIFIED POSTPROCEDURAL STATES: Chronic | ICD-10-CM

## 2025-06-11 DIAGNOSIS — G47.33 OBSTRUCTIVE SLEEP APNEA (ADULT) (PEDIATRIC): ICD-10-CM

## 2025-06-11 PROCEDURE — 95800 SLP STDY UNATTENDED: CPT | Mod: 26

## 2025-06-11 PROCEDURE — 95800 SLP STDY UNATTENDED: CPT

## 2025-06-12 NOTE — ED ADULT NURSE REASSESSMENT NOTE - NS ED NURSE REASSESS COMMENT FT1
Marcia Poe presents to Urgent Care Patient arriving: alone with complaint of diarrhea for 3 days, nausea, temperature of about 101.    Patient masked: No  Clinical masked: No    Can leave detailed message on mobile phone:    Mobile [phone]      spoke to md camacho of pt dispo. pt ok to go to m/s per md, md will order abg. respiratory called for abg.

## 2025-06-13 RX ORDER — BACLOFEN 10 MG/1
10 TABLET ORAL
Qty: 60 | Refills: 1 | Status: DISCONTINUED | COMMUNITY
Start: 2025-06-12 | End: 2025-06-13

## 2025-06-13 RX ORDER — CYCLOBENZAPRINE HYDROCHLORIDE 5 MG/1
5 TABLET, FILM COATED ORAL
Qty: 30 | Refills: 1 | Status: ACTIVE | COMMUNITY
Start: 2025-06-13 | End: 1900-01-01

## 2025-06-14 DIAGNOSIS — G47.33 OBSTRUCTIVE SLEEP APNEA (ADULT) (PEDIATRIC): ICD-10-CM

## 2025-06-16 RX ORDER — LEVALBUTEROL TARTRATE 45 UG/1
45 AEROSOL, METERED ORAL
Qty: 3 | Refills: 3 | Status: ACTIVE | COMMUNITY
Start: 1900-01-01 | End: 1900-01-01

## 2025-06-17 ENCOUNTER — APPOINTMENT (OUTPATIENT)
Dept: PULMONOLOGY | Facility: CLINIC | Age: 60
End: 2025-06-17

## 2025-07-02 ENCOUNTER — APPOINTMENT (OUTPATIENT)
Dept: GASTROENTEROLOGY | Facility: CLINIC | Age: 60
End: 2025-07-02

## 2025-07-02 VITALS
WEIGHT: 175 LBS | HEIGHT: 67 IN | BODY MASS INDEX: 27.47 KG/M2 | OXYGEN SATURATION: 97 % | HEART RATE: 88 BPM | DIASTOLIC BLOOD PRESSURE: 85 MMHG | SYSTOLIC BLOOD PRESSURE: 125 MMHG

## 2025-07-11 ENCOUNTER — APPOINTMENT (OUTPATIENT)
Dept: PULMONOLOGY | Facility: CLINIC | Age: 60
End: 2025-07-11
Payer: COMMERCIAL

## 2025-07-11 PROCEDURE — 99214 OFFICE O/P EST MOD 30 MIN: CPT | Mod: 93

## 2025-07-11 PROCEDURE — G2211 COMPLEX E/M VISIT ADD ON: CPT | Mod: NC,93

## 2025-07-12 ENCOUNTER — NON-APPOINTMENT (OUTPATIENT)
Age: 60
End: 2025-07-12

## 2025-07-14 ENCOUNTER — APPOINTMENT (OUTPATIENT)
Dept: GASTROENTEROLOGY | Facility: CLINIC | Age: 60
End: 2025-07-14
Payer: COMMERCIAL

## 2025-07-14 PROCEDURE — G2211 COMPLEX E/M VISIT ADD ON: CPT | Mod: NC

## 2025-07-14 PROCEDURE — 99215 OFFICE O/P EST HI 40 MIN: CPT

## 2025-08-06 ENCOUNTER — NON-APPOINTMENT (OUTPATIENT)
Age: 60
End: 2025-08-06

## 2025-08-15 ENCOUNTER — RESULT REVIEW (OUTPATIENT)
Age: 60
End: 2025-08-15

## 2025-08-15 ENCOUNTER — NON-APPOINTMENT (OUTPATIENT)
Age: 60
End: 2025-08-15

## 2025-08-15 ENCOUNTER — OUTPATIENT (OUTPATIENT)
Dept: OUTPATIENT SERVICES | Facility: HOSPITAL | Age: 60
LOS: 1 days | Discharge: HOME | End: 2025-08-15
Payer: COMMERCIAL

## 2025-08-15 VITALS
HEART RATE: 75 BPM | SYSTOLIC BLOOD PRESSURE: 129 MMHG | OXYGEN SATURATION: 98 % | WEIGHT: 177.91 LBS | TEMPERATURE: 97 F | RESPIRATION RATE: 18 BRPM | HEIGHT: 66 IN | DIASTOLIC BLOOD PRESSURE: 83 MMHG

## 2025-08-15 VITALS
DIASTOLIC BLOOD PRESSURE: 72 MMHG | HEART RATE: 78 BPM | RESPIRATION RATE: 22 BRPM | OXYGEN SATURATION: 99 % | SYSTOLIC BLOOD PRESSURE: 134 MMHG

## 2025-08-15 DIAGNOSIS — Z98.890 OTHER SPECIFIED POSTPROCEDURAL STATES: Chronic | ICD-10-CM

## 2025-08-15 DIAGNOSIS — Z90.710 ACQUIRED ABSENCE OF BOTH CERVIX AND UTERUS: Chronic | ICD-10-CM

## 2025-08-15 DIAGNOSIS — D49.0 NEOPLASM OF UNSPECIFIED BEHAVIOR OF DIGESTIVE SYSTEM: ICD-10-CM

## 2025-08-15 LAB — GLUCOSE BLDC GLUCOMTR-MCNC: 145 MG/DL — HIGH (ref 70–99)

## 2025-08-15 PROCEDURE — 43239 EGD BIOPSY SINGLE/MULTIPLE: CPT | Mod: XU

## 2025-08-15 PROCEDURE — 82962 GLUCOSE BLOOD TEST: CPT

## 2025-08-15 PROCEDURE — 43259 EGD US EXAM DUODENUM/JEJUNUM: CPT

## 2025-08-15 PROCEDURE — 88312 SPECIAL STAINS GROUP 1: CPT | Mod: 26

## 2025-08-15 PROCEDURE — 88312 SPECIAL STAINS GROUP 1: CPT

## 2025-08-15 PROCEDURE — 88305 TISSUE EXAM BY PATHOLOGIST: CPT

## 2025-08-15 PROCEDURE — 88305 TISSUE EXAM BY PATHOLOGIST: CPT | Mod: 26

## 2025-08-20 LAB — SURGICAL PATHOLOGY STUDY: SIGNIFICANT CHANGE UP

## 2025-08-21 DIAGNOSIS — K86.2 CYST OF PANCREAS: ICD-10-CM

## 2025-08-21 DIAGNOSIS — K29.50 UNSPECIFIED CHRONIC GASTRITIS WITHOUT BLEEDING: ICD-10-CM

## 2025-08-21 DIAGNOSIS — K44.9 DIAPHRAGMATIC HERNIA WITHOUT OBSTRUCTION OR GANGRENE: ICD-10-CM

## 2025-08-21 DIAGNOSIS — K29.80 DUODENITIS WITHOUT BLEEDING: ICD-10-CM

## 2025-08-21 DIAGNOSIS — K20.90 ESOPHAGITIS, UNSPECIFIED WITHOUT BLEEDING: ICD-10-CM

## 2025-08-29 ENCOUNTER — APPOINTMENT (OUTPATIENT)
Dept: GASTROENTEROLOGY | Facility: CLINIC | Age: 60
End: 2025-08-29
Payer: COMMERCIAL

## 2025-08-29 VITALS
BODY MASS INDEX: 28.09 KG/M2 | HEART RATE: 86 BPM | DIASTOLIC BLOOD PRESSURE: 91 MMHG | WEIGHT: 179 LBS | SYSTOLIC BLOOD PRESSURE: 119 MMHG | HEIGHT: 67 IN

## 2025-08-29 DIAGNOSIS — R10.9 UNSPECIFIED ABDOMINAL PAIN: ICD-10-CM

## 2025-08-29 DIAGNOSIS — K76.0 FATTY (CHANGE OF) LIVER, NOT ELSEWHERE CLASSIFIED: ICD-10-CM

## 2025-08-29 DIAGNOSIS — Z14.8 GENETIC CARRIER OF OTHER DISEASE: ICD-10-CM

## 2025-08-29 DIAGNOSIS — R74.8 ABNORMAL LEVELS OF OTHER SERUM ENZYMES: ICD-10-CM

## 2025-08-29 PROBLEM — E11.9 TYPE 2 DIABETES MELLITUS WITHOUT COMPLICATIONS: Chronic | Status: ACTIVE | Noted: 2025-08-15

## 2025-08-29 PROCEDURE — G2211 COMPLEX E/M VISIT ADD ON: CPT | Mod: NC

## 2025-08-29 PROCEDURE — 99215 OFFICE O/P EST HI 40 MIN: CPT

## 2025-09-08 ENCOUNTER — APPOINTMENT (OUTPATIENT)
Dept: GASTROENTEROLOGY | Facility: CLINIC | Age: 60
End: 2025-09-08

## 2025-09-08 VITALS
HEIGHT: 67 IN | DIASTOLIC BLOOD PRESSURE: 96 MMHG | SYSTOLIC BLOOD PRESSURE: 144 MMHG | HEART RATE: 96 BPM | WEIGHT: 179 LBS | BODY MASS INDEX: 28.09 KG/M2

## 2025-09-08 DIAGNOSIS — D49.0 NEOPLASM OF UNSPECIFIED BEHAVIOR OF DIGESTIVE SYSTEM: ICD-10-CM

## 2025-09-08 DIAGNOSIS — K59.00 CONSTIPATION, UNSPECIFIED: ICD-10-CM

## 2025-09-08 DIAGNOSIS — K21.9 GASTRO-ESOPHAGEAL REFLUX DISEASE W/OUT ESOPHAGITIS: ICD-10-CM

## 2025-09-08 DIAGNOSIS — K59.09 OTHER CONSTIPATION: ICD-10-CM

## 2025-09-08 DIAGNOSIS — K44.9 DIAPHRAGMATIC HERNIA W/OUT OBSTRUCTION OR GANGRENE: ICD-10-CM

## 2025-09-17 ENCOUNTER — APPOINTMENT (OUTPATIENT)
Dept: UROGYNECOLOGY | Facility: CLINIC | Age: 60
End: 2025-09-17

## 2025-09-23 PROBLEM — J68.3 REACTIVE AIRWAYS DYSFUNCTION SYNDROME: Status: ACTIVE | Noted: 2025-09-23
